# Patient Record
Sex: FEMALE | Race: WHITE | NOT HISPANIC OR LATINO | Employment: UNEMPLOYED | URBAN - METROPOLITAN AREA
[De-identification: names, ages, dates, MRNs, and addresses within clinical notes are randomized per-mention and may not be internally consistent; named-entity substitution may affect disease eponyms.]

---

## 2017-06-07 ENCOUNTER — LAB REQUISITION (OUTPATIENT)
Dept: LAB | Facility: HOSPITAL | Age: 42
End: 2017-06-07
Payer: COMMERCIAL

## 2017-06-07 ENCOUNTER — ALLSCRIPTS OFFICE VISIT (OUTPATIENT)
Dept: OTHER | Facility: OTHER | Age: 42
End: 2017-06-07

## 2017-06-07 DIAGNOSIS — Z01.419 ENCOUNTER FOR GYNECOLOGICAL EXAMINATION WITHOUT ABNORMAL FINDING: ICD-10-CM

## 2017-06-07 DIAGNOSIS — R20.2 PARESTHESIA OF SKIN: ICD-10-CM

## 2017-06-07 DIAGNOSIS — Z12.31 ENCOUNTER FOR SCREENING MAMMOGRAM FOR MALIGNANT NEOPLASM OF BREAST: ICD-10-CM

## 2017-06-07 DIAGNOSIS — E78.5 HYPERLIPIDEMIA: ICD-10-CM

## 2017-06-07 PROCEDURE — 87624 HPV HI-RISK TYP POOLED RSLT: CPT | Performed by: OBSTETRICS & GYNECOLOGY

## 2017-06-07 PROCEDURE — G0145 SCR C/V CYTO,THINLAYER,RESCR: HCPCS | Performed by: OBSTETRICS & GYNECOLOGY

## 2017-06-09 LAB — HPV RRNA GENITAL QL NAA+PROBE: NORMAL

## 2017-06-12 ENCOUNTER — GENERIC CONVERSION - ENCOUNTER (OUTPATIENT)
Dept: OTHER | Facility: OTHER | Age: 42
End: 2017-06-12

## 2017-06-12 ENCOUNTER — TRANSCRIBE ORDERS (OUTPATIENT)
Dept: ADMINISTRATIVE | Facility: HOSPITAL | Age: 42
End: 2017-06-12

## 2017-06-12 DIAGNOSIS — Z12.31 ENCOUNTER FOR MAMMOGRAM TO ESTABLISH BASELINE MAMMOGRAM: Primary | ICD-10-CM

## 2017-06-12 LAB
LAB AP GYN PRIMARY INTERPRETATION: NORMAL
Lab: NORMAL

## 2017-06-14 ENCOUNTER — ALLSCRIPTS OFFICE VISIT (OUTPATIENT)
Dept: OTHER | Facility: OTHER | Age: 42
End: 2017-06-14

## 2017-06-16 ENCOUNTER — HOSPITAL ENCOUNTER (OUTPATIENT)
Dept: MAMMOGRAPHY | Facility: HOSPITAL | Age: 42
Discharge: HOME | End: 2017-06-16
Attending: OBSTETRICS & GYNECOLOGY
Payer: COMMERCIAL

## 2017-06-16 DIAGNOSIS — Z12.31 ENCOUNTER FOR SCREENING MAMMOGRAM FOR MALIGNANT NEOPLASM OF BREAST: ICD-10-CM

## 2017-06-16 PROCEDURE — G0202 SCR MAMMO BI INCL CAD: HCPCS

## 2017-06-23 ENCOUNTER — APPOINTMENT (OUTPATIENT)
Dept: LAB | Facility: HOSPITAL | Age: 42
End: 2017-06-23
Payer: COMMERCIAL

## 2017-06-23 ENCOUNTER — TRANSCRIBE ORDERS (OUTPATIENT)
Dept: LAB | Facility: HOSPITAL | Age: 42
End: 2017-06-23

## 2017-06-23 PROCEDURE — 80053 COMPREHEN METABOLIC PANEL: CPT | Performed by: OBSTETRICS & GYNECOLOGY

## 2017-06-23 PROCEDURE — 84443 ASSAY THYROID STIM HORMONE: CPT | Performed by: OBSTETRICS & GYNECOLOGY

## 2017-06-23 PROCEDURE — 36415 COLL VENOUS BLD VENIPUNCTURE: CPT | Performed by: OBSTETRICS & GYNECOLOGY

## 2017-06-23 PROCEDURE — 80061 LIPID PANEL: CPT | Performed by: OBSTETRICS & GYNECOLOGY

## 2017-06-26 ENCOUNTER — GENERIC CONVERSION - ENCOUNTER (OUTPATIENT)
Dept: OTHER | Facility: OTHER | Age: 42
End: 2017-06-26

## 2018-01-09 NOTE — RESULT NOTES
Verified Results  (1) COMPREHENSIVE METABOLIC PANEL [id number] 54:94WV V[facility]res   TW Order Number: [id number]     Test Name Result Flag Reference   SODIUM 138 mmol/L  136-145   POTASSIUM 3 9 mmol/L  3 5-5 3   CHLORIDE 106 mmol/L  100-108   CARBON DIOXIDE 26 mmol/L  21-32   ANION GAP (CALC) 6 mmol/L  4-13   BLOOD UREA NITROGEN 11 mg/dL  5-25   CREATININE 0 70 mg/dL  0 60-1 30   Standardized to IDMS reference method   CALCIUM 8 9 mg/dL  8 3-10 1   BILI, TOTAL 0 84 mg/dL  0 20-1 00   ALK PHOSPHATAS 50 U/L     ALT (SGPT) 15 U/L  12-78   AST(SGOT) 17 U/L  5-45   ALBUMIN 3 7 g/dL  3 5-5 0   TOTAL PROTEIN 7 2 g/dL  6 4-8 2   eGFR Non-African American      >60 0 ml/min/1 73sq [facility] Disease Education Program recommendations are as follows:  GFR calculation is accurate only with a steady state creatinine  Chronic Kidney disease less than 60 ml/min/1 73 sq  meters  Kidney failure less than 15 ml/min/1 73 sq  meters  GLUCOSE FASTING 84 mg/dL  65-99     (1) LIPID PANEL FASTING W DIRECT LDL REFLEX 23Jun2017 08:48AM Vou[place]res    Order Number: [id number]     Test Name Result Flag Reference   CHOLESTEROL 216 mg/dL H    LDL CHOLESTEROL CALCULATED 110 mg/dL H 0-100   Triglyceride:         Normal              <150 mg/dl       Borderline High    150-199 mg/dl       High               200-499 mg/dl       Very High          >499 mg/dl  Cholesterol:         Desirable        <200 mg/dl      Borderline High  200-239 mg/dl      High             >239 mg/dl  HDL Cholesterol:        High    >59 mg/dL      Low     <41 mg/dL  LDL Cholesterol:        Optimal          <100 mg/dl        Near Optimal     100-129 mg/dl        Above Optimal          Borderline High   130-159 mg/dl          High              160-189 mg/dl          Very High        >189 mg/dl  LDL CALCULATED:    This screening LDL is a calculated result    It does not have the accuracy of the Direct Measured LDL in the monitoring of patients with hyperlipidemia and/or statin therapy  Direct Measure LDL (BHN033) must be ordered separately in these patients  TRIGLYCERIDES 83 mg/dL  <=150   Specimen collection should occur prior to N-Acetylcysteine or Metamizole administration due to the potential for falsely depressed results  HDL,DIRECT 89 mg/dL H 40-60   Specimen collection should occur prior to Metamizole administration due to the potential for falsely depressed results  (1) TSH WITH FT4 REFLEX 23Jun2017 08:48AM Pam Geiger    Order Number: [id number]     Test Name Result Flag Reference   TSH 2 720 uIU/mL  0 358-3 740   Patients undergoing fluorescein dye angiography may retain small amounts of fluorescein in the body for 48-72 hours post procedure  Samples containing fluorescein can produce falsely depressed TSH values  If the patient had this procedure,a specimen should be resubmitted post fluorescein clearance            The recommended reference ranges for TSH during pregnancy are as follows:  First trimester 0 1 to 2 5 uIU/mL  Second trimester  0 2 to 3 0 uIU/mL  Third trimester 0 3 to 3 0 uIU/m

## 2018-01-12 VITALS
BODY MASS INDEX: 21.02 KG/M2 | SYSTOLIC BLOOD PRESSURE: 120 MMHG | WEIGHT: 114.25 LBS | HEART RATE: 72 BPM | DIASTOLIC BLOOD PRESSURE: 70 MMHG | OXYGEN SATURATION: 98 % | RESPIRATION RATE: 16 BRPM | TEMPERATURE: 97.4 F | HEIGHT: 62 IN

## 2018-01-13 VITALS
SYSTOLIC BLOOD PRESSURE: 118 MMHG | HEIGHT: 62 IN | DIASTOLIC BLOOD PRESSURE: 64 MMHG | WEIGHT: 118 LBS | RESPIRATION RATE: 16 BRPM | HEART RATE: 80 BPM | BODY MASS INDEX: 21.71 KG/M2

## 2018-01-15 NOTE — RESULT NOTES
Verified Results  (1) THIN PREP PAP WITH IMAGING 07Jun2017 09:10AM Jenifer Tinsley Order Number: [id number]     Test Name Result Flag Reference   LAB AP CASE REPORT (Report)     Gynecologic Cytology Report            Case: [id number]                  Authorizing Provider: Vamshi Hall MD    Collected:      06/07/2017 0910        First Screen:     LEEANNE Shaffer   Received:      06/08/2017 1007        Specimen:  LIQUID-BASED PAP, SCREENING, Endocervical   HPV HIGH RISK RESULT (Report)     HPV, High Risk: HPV NEG, HPV16 NEG, HPV18 NEG      Other High Risk HPV Negative, HPV 16 Negative, HPV 18 Negative  HPV types: 16,18,31,33,35,39,45,51,52,56,58,59,66 and 68 DNA are undetectable or below the pre-set threshold  Roche???s FDA approved Ingrid 4800 is utilized with strict adherence to the ???s instruction  manual to test for the presence of High-Risk HPV DNA, as well as HPV 16 and HPV 18  This instrument  has been validated by our laboratory and/or by the   A negative result does not preclude the presence of HPV infection because results depend on adequate  specimen collection, absence of inhibitors and sufficient DNA to be detected  Additionally, HPV negative  results are not intended to prevent women from proceeding to colposcopy if clinically warranted  Positive HPV test results indicate the presence of any one or more of the high risk types, but since patients  are often co-infected with low-risk types it does not rule out the presence of low-risk types in patients  with mixed infections  LAB AP GYN PRIMARY INTERPRETATION      Negative for intraepithelial lesion or malignancy  Electronically signed by LEEANNE Shaffer on 6/12/2017 at 2:38 PM   LAB AP GYN SPECIMEN ADEQUACY      Satisfactory for evaluation  Endocervical/transformation zone component present     LAB AP GYN ADDITIONAL INFORMATION (Report)     Hologic's FDA approved ,  and ThinPrep Imaging System are   utilized with strict adherence to the 's instruction manual to   prepare gynecologic and non-gynecologic cytology specimens for the   production of ThinPrep slides as well as for gynecologic ThinPrep imaging  These processes have been validated by our laboratory and/or by the     The Pap test is not a diagnostic procedure and should not be used as the   sole means to detect cervical cancer  It is only a screening procedure to   aid in the detection of cervical cancer and its precursors  Both   false-negative and false-positive results have been experienced  Your   patient's test result should be interpreted in this context together with   the history and clinical findings         Signatures   Electronically signed by : RAGHAVENDRA Ho ; Jun 12 2017  3:12PM EST                       (Author)

## 2018-10-08 LAB
CHLAMYDIA,AMPLIFIED DNA PROBE (HISTORICAL): NEGATIVE
HCV AB SER-ACNC: NEGATIVE
N GONORRHOEAE, AMPLIFIED DNA (HISTORICAL): NEGATIVE

## 2019-04-17 ENCOUNTER — TELEPHONE (OUTPATIENT)
Dept: INTERNAL MEDICINE CLINIC | Facility: CLINIC | Age: 44
End: 2019-04-17

## 2019-05-20 ENCOUNTER — OFFICE VISIT (OUTPATIENT)
Dept: GYNECOLOGY | Facility: CLINIC | Age: 44
End: 2019-05-20

## 2019-05-20 VITALS
DIASTOLIC BLOOD PRESSURE: 52 MMHG | SYSTOLIC BLOOD PRESSURE: 102 MMHG | BODY MASS INDEX: 21.75 KG/M2 | WEIGHT: 118.2 LBS | HEIGHT: 62 IN

## 2019-05-20 DIAGNOSIS — O30.009 TWIN PREGNANCY RESULTING FROM ASSISTED REPRODUCTIVE TECHNOLOGY (ART): ICD-10-CM

## 2019-05-20 DIAGNOSIS — O21.9 NAUSEA AND VOMITING DURING PREGNANCY: ICD-10-CM

## 2019-05-20 DIAGNOSIS — O09.91 SUPERVISION OF HIGH RISK PREGNANCY, ANTEPARTUM, FIRST TRIMESTER: Primary | ICD-10-CM

## 2019-05-20 DIAGNOSIS — O09.521 MULTIGRAVIDA OF ADVANCED MATERNAL AGE IN FIRST TRIMESTER: ICD-10-CM

## 2019-05-20 DIAGNOSIS — O09.819 TWIN PREGNANCY RESULTING FROM ASSISTED REPRODUCTIVE TECHNOLOGY (ART): ICD-10-CM

## 2019-05-20 PROCEDURE — OBC: Performed by: OBSTETRICS & GYNECOLOGY

## 2019-05-20 RX ORDER — LEVOTHYROXINE SODIUM 0.12 MG/1
TABLET ORAL
COMMUNITY
Start: 2019-05-17 | End: 2019-06-11 | Stop reason: SDUPTHER

## 2019-05-20 RX ORDER — METOCLOPRAMIDE 10 MG/1
10 TABLET ORAL 4 TIMES DAILY
Qty: 30 TABLET | Refills: 2 | Status: SHIPPED | OUTPATIENT
Start: 2019-05-20 | End: 2019-06-28

## 2019-05-20 RX ORDER — DOXYLAMINE SUCCINATE AND PYRIDOXINE HYDROCHLORIDE, DELAYED RELEASE TABLETS 10 MG/10 MG 10; 10 MG/1; MG/1
TABLET, DELAYED RELEASE ORAL
COMMUNITY
End: 2019-07-31 | Stop reason: ALTCHOICE

## 2019-05-30 ENCOUNTER — TELEPHONE (OUTPATIENT)
Dept: GYNECOLOGY | Facility: CLINIC | Age: 44
End: 2019-05-30

## 2019-05-30 DIAGNOSIS — O21.9 NAUSEA AND VOMITING DURING PREGNANCY: Primary | ICD-10-CM

## 2019-05-30 RX ORDER — ONDANSETRON 8 MG/1
8 TABLET, ORALLY DISINTEGRATING ORAL EVERY 8 HOURS PRN
Qty: 20 TABLET | Refills: 1 | Status: SHIPPED | OUTPATIENT
Start: 2019-05-30 | End: 2019-06-06 | Stop reason: SDUPTHER

## 2019-05-31 ENCOUNTER — TELEPHONE (OUTPATIENT)
Dept: GYNECOLOGY | Facility: CLINIC | Age: 44
End: 2019-05-31

## 2019-05-31 DIAGNOSIS — O21.9 NAUSEA AND VOMITING DURING PREGNANCY: Primary | ICD-10-CM

## 2019-05-31 RX ORDER — DOXYLAMINE SUCCINATE AND PYRIDOXINE HYDROCHLORIDE, DELAYED RELEASE TABLETS 10 MG/10 MG 10; 10 MG/1; MG/1
TABLET, DELAYED RELEASE ORAL
Qty: 100 TABLET | Refills: 2 | Status: SHIPPED | OUTPATIENT
Start: 2019-05-31 | End: 2019-06-28

## 2019-06-06 ENCOUNTER — INITIAL PRENATAL (OUTPATIENT)
Dept: OBGYN CLINIC | Facility: CLINIC | Age: 44
End: 2019-06-06

## 2019-06-06 VITALS — BODY MASS INDEX: 20.96 KG/M2 | WEIGHT: 114.6 LBS | DIASTOLIC BLOOD PRESSURE: 62 MMHG | SYSTOLIC BLOOD PRESSURE: 110 MMHG

## 2019-06-06 DIAGNOSIS — O21.9 NAUSEA AND VOMITING DURING PREGNANCY: ICD-10-CM

## 2019-06-06 DIAGNOSIS — O30.009 TWIN PREGNANCY RESULTING FROM ASSISTED REPRODUCTIVE TECHNOLOGY (ART): ICD-10-CM

## 2019-06-06 DIAGNOSIS — O09.819 TWIN PREGNANCY RESULTING FROM ASSISTED REPRODUCTIVE TECHNOLOGY (ART): ICD-10-CM

## 2019-06-06 DIAGNOSIS — O09.91 SUPERVISION OF HIGH RISK PREGNANCY, ANTEPARTUM, FIRST TRIMESTER: Primary | ICD-10-CM

## 2019-06-06 PROCEDURE — PNV: Performed by: NURSE PRACTITIONER

## 2019-06-06 RX ORDER — ONDANSETRON 8 MG/1
8 TABLET, ORALLY DISINTEGRATING ORAL EVERY 8 HOURS PRN
Qty: 20 TABLET | Refills: 1 | Status: SHIPPED | OUTPATIENT
Start: 2019-06-06 | End: 2019-06-25 | Stop reason: SDUPTHER

## 2019-06-06 RX ORDER — PANTOPRAZOLE SODIUM 40 MG/1
40 TABLET, DELAYED RELEASE ORAL DAILY
Qty: 30 TABLET | Refills: 3 | Status: SHIPPED | OUTPATIENT
Start: 2019-06-06 | End: 2019-07-31 | Stop reason: ALTCHOICE

## 2019-06-10 ENCOUNTER — TELEPHONE (OUTPATIENT)
Dept: OBGYN CLINIC | Facility: CLINIC | Age: 44
End: 2019-06-10

## 2019-06-11 DIAGNOSIS — E03.9 HYPOTHYROIDISM AFFECTING PREGNANCY IN FIRST TRIMESTER: Primary | ICD-10-CM

## 2019-06-11 DIAGNOSIS — O99.281 HYPOTHYROIDISM AFFECTING PREGNANCY IN FIRST TRIMESTER: Primary | ICD-10-CM

## 2019-06-11 RX ORDER — LEVOTHYROXINE SODIUM 0.12 MG/1
125 TABLET ORAL DAILY
Qty: 30 TABLET | Refills: 1 | Status: SHIPPED | OUTPATIENT
Start: 2019-06-11 | End: 2019-07-31 | Stop reason: SDUPTHER

## 2019-06-25 DIAGNOSIS — O21.9 NAUSEA AND VOMITING DURING PREGNANCY: ICD-10-CM

## 2019-06-25 RX ORDER — ONDANSETRON 8 MG/1
8 TABLET, ORALLY DISINTEGRATING ORAL EVERY 8 HOURS PRN
Qty: 20 TABLET | Refills: 1 | Status: SHIPPED | OUTPATIENT
Start: 2019-06-25 | End: 2019-07-15 | Stop reason: SDUPTHER

## 2019-06-26 ENCOUNTER — ROUTINE PRENATAL (OUTPATIENT)
Dept: PERINATAL CARE | Facility: CLINIC | Age: 44
End: 2019-06-26
Payer: COMMERCIAL

## 2019-06-26 ENCOUNTER — TELEPHONE (OUTPATIENT)
Dept: OBGYN CLINIC | Facility: CLINIC | Age: 44
End: 2019-06-26

## 2019-06-26 VITALS
BODY MASS INDEX: 21.09 KG/M2 | HEART RATE: 82 BPM | WEIGHT: 114.6 LBS | SYSTOLIC BLOOD PRESSURE: 127 MMHG | DIASTOLIC BLOOD PRESSURE: 85 MMHG | HEIGHT: 62 IN

## 2019-06-26 DIAGNOSIS — O30.009 TWIN PREGNANCY RESULTING FROM ASSISTED REPRODUCTIVE TECHNOLOGY (ART): ICD-10-CM

## 2019-06-26 DIAGNOSIS — O09.819 TWIN PREGNANCY RESULTING FROM ASSISTED REPRODUCTIVE TECHNOLOGY (ART): ICD-10-CM

## 2019-06-26 DIAGNOSIS — O35.8XX1 ECHOGENIC FOCUS OF HEART OF FETUS AFFECTING ANTEPARTUM CARE OF MOTHER, FETUS 1: ICD-10-CM

## 2019-06-26 DIAGNOSIS — O09.511 ELDERLY PRIMIGRAVIDA IN FIRST TRIMESTER: ICD-10-CM

## 2019-06-26 DIAGNOSIS — Z36.82 ENCOUNTER FOR NUCHAL TRANSLUCENCY TESTING: ICD-10-CM

## 2019-06-26 DIAGNOSIS — O09.811 PREGNANCY RESULTING FROM IN VITRO FERTILIZATION IN FIRST TRIMESTER: Primary | ICD-10-CM

## 2019-06-26 DIAGNOSIS — O21.9 NAUSEA AND VOMITING DURING PREGNANCY: ICD-10-CM

## 2019-06-26 DIAGNOSIS — Z3A.13 13 WEEKS GESTATION OF PREGNANCY: ICD-10-CM

## 2019-06-26 DIAGNOSIS — O09.91 SUPERVISION OF HIGH RISK PREGNANCY, ANTEPARTUM, FIRST TRIMESTER: ICD-10-CM

## 2019-06-26 PROCEDURE — 76814 OB US NUCHAL MEAS ADD-ON: CPT | Performed by: OBSTETRICS & GYNECOLOGY

## 2019-06-26 PROCEDURE — 99242 OFF/OP CONSLTJ NEW/EST SF 20: CPT | Performed by: OBSTETRICS & GYNECOLOGY

## 2019-06-26 PROCEDURE — 76813 OB US NUCHAL MEAS 1 GEST: CPT | Performed by: OBSTETRICS & GYNECOLOGY

## 2019-06-26 PROCEDURE — 76801 OB US < 14 WKS SINGLE FETUS: CPT | Performed by: OBSTETRICS & GYNECOLOGY

## 2019-06-26 PROCEDURE — 76802 OB US < 14 WKS ADDL FETUS: CPT | Performed by: OBSTETRICS & GYNECOLOGY

## 2019-06-26 RX ORDER — ASPIRIN 81 MG/1
162 TABLET ORAL DAILY
Qty: 180 TABLET | Refills: 3 | Status: SHIPPED | OUTPATIENT
Start: 2019-06-26 | End: 2019-11-26 | Stop reason: ALTCHOICE

## 2019-06-26 RX ORDER — UREA 10 %
800 LOTION (ML) TOPICAL DAILY
COMMUNITY

## 2019-06-28 ENCOUNTER — ROUTINE PRENATAL (OUTPATIENT)
Dept: OBGYN CLINIC | Facility: CLINIC | Age: 44
End: 2019-06-28

## 2019-06-28 ENCOUNTER — HOSPITAL ENCOUNTER (OUTPATIENT)
Dept: INFUSION CENTER | Facility: CLINIC | Age: 44
Discharge: HOME | End: 2019-06-28
Payer: COMMERCIAL

## 2019-06-28 VITALS
HEART RATE: 77 BPM | SYSTOLIC BLOOD PRESSURE: 102 MMHG | OXYGEN SATURATION: 99 % | RESPIRATION RATE: 18 BRPM | DIASTOLIC BLOOD PRESSURE: 64 MMHG | TEMPERATURE: 98.1 F

## 2019-06-28 VITALS
BODY MASS INDEX: 21.71 KG/M2 | DIASTOLIC BLOOD PRESSURE: 60 MMHG | WEIGHT: 118 LBS | SYSTOLIC BLOOD PRESSURE: 108 MMHG | HEIGHT: 62 IN

## 2019-06-28 DIAGNOSIS — O09.91 SUPERVISION OF HIGH RISK PREGNANCY, ANTEPARTUM, FIRST TRIMESTER: ICD-10-CM

## 2019-06-28 DIAGNOSIS — O09.819 TWIN PREGNANCY RESULTING FROM ASSISTED REPRODUCTIVE TECHNOLOGY (ART): ICD-10-CM

## 2019-06-28 DIAGNOSIS — O21.9 NAUSEA AND VOMITING DURING PREGNANCY: Primary | ICD-10-CM

## 2019-06-28 DIAGNOSIS — Z3A.14 PREGNANCY WITH 14 COMPLETED WEEKS GESTATION: Primary | ICD-10-CM

## 2019-06-28 DIAGNOSIS — O30.009 TWIN PREGNANCY RESULTING FROM ASSISTED REPRODUCTIVE TECHNOLOGY (ART): ICD-10-CM

## 2019-06-28 PROBLEM — O35.8XX1 ECHOGENIC FOCUS OF HEART OF FETUS AFFECTING ANTEPARTUM CARE OF MOTHER, FETUS 1: Status: ACTIVE | Noted: 2019-06-28

## 2019-06-28 PROBLEM — O35.BXX1 ECHOGENIC FOCUS OF HEART OF FETUS AFFECTING ANTEPARTUM CARE OF MOTHER, FETUS 1: Status: ACTIVE | Noted: 2019-06-28

## 2019-06-28 PROBLEM — Z3A.13 13 WEEKS GESTATION OF PREGNANCY: Status: ACTIVE | Noted: 2019-06-28

## 2019-06-28 PROCEDURE — 96360 HYDRATION IV INFUSION INIT: CPT

## 2019-06-28 PROCEDURE — PNV: Performed by: OBSTETRICS & GYNECOLOGY

## 2019-06-28 RX ADMIN — SODIUM CHLORIDE 1000 ML: 0.9 INJECTION, SOLUTION INTRAVENOUS at 10:33

## 2019-06-28 NOTE — PROGRESS NOTES
Patient tolerated her hydration without any adverse reactions   Patient declined avs but has a copy of her schedule

## 2019-06-28 NOTE — PROGRESS NOTES
Patient is here for hydration  She offers no complaints at this time  She states the zofran she is taking now at home is helping and she is not nauseated

## 2019-07-01 ENCOUNTER — HOSPITAL ENCOUNTER (OUTPATIENT)
Dept: INFUSION CENTER | Facility: CLINIC | Age: 44
Discharge: HOME | End: 2019-07-01
Payer: COMMERCIAL

## 2019-07-01 VITALS
DIASTOLIC BLOOD PRESSURE: 62 MMHG | RESPIRATION RATE: 18 BRPM | OXYGEN SATURATION: 99 % | SYSTOLIC BLOOD PRESSURE: 105 MMHG | HEART RATE: 80 BPM | TEMPERATURE: 98.2 F

## 2019-07-01 DIAGNOSIS — O30.009 TWIN PREGNANCY RESULTING FROM ASSISTED REPRODUCTIVE TECHNOLOGY (ART): ICD-10-CM

## 2019-07-01 DIAGNOSIS — O09.91 SUPERVISION OF HIGH RISK PREGNANCY, ANTEPARTUM, FIRST TRIMESTER: ICD-10-CM

## 2019-07-01 DIAGNOSIS — O21.9 NAUSEA AND VOMITING DURING PREGNANCY: Primary | ICD-10-CM

## 2019-07-01 DIAGNOSIS — O09.819 TWIN PREGNANCY RESULTING FROM ASSISTED REPRODUCTIVE TECHNOLOGY (ART): ICD-10-CM

## 2019-07-01 PROCEDURE — 96360 HYDRATION IV INFUSION INIT: CPT

## 2019-07-01 RX ADMIN — SODIUM CHLORIDE 1000 ML: 0.9 INJECTION, SOLUTION INTRAVENOUS at 14:48

## 2019-07-01 NOTE — PROGRESS NOTES
Pt tolerated infusion without complications, pt aware of next appointment on July 8, pt declined avs

## 2019-07-01 NOTE — PLAN OF CARE
Problem: Potential for Falls  Goal: Patient will remain free of falls  Description  INTERVENTIONS:  - Assess patient frequently for physical needs  -  Identify cognitive and physical deficits and behaviors that affect risk of falls  -  [place] fall precautions as indicated by assessment   - Educate patient/family on patient safety including physical limitations  - Instruct patient to call for assistance with activity based on assessment  - Modify environment to reduce risk of injury  - Consider OT/PT consult to assist with strengthening/mobility  Outcome: Progressing     Problem: Knowledge Deficit  Goal: Patient/family/caregiver demonstrates understanding of disease process, treatment plan, medications, and discharge instructions  Description  Complete learning assessment and assess knowledge base    Interventions:  - Provide teaching at level of understanding  - Provide teaching via preferred learning methods  Outcome: Progressing

## 2019-07-02 ENCOUNTER — HOSPITAL ENCOUNTER (OUTPATIENT)
Dept: INFUSION CENTER | Facility: CLINIC | Age: 44
End: 2019-07-02

## 2019-07-02 ENCOUNTER — OFFICE VISIT (OUTPATIENT)
Dept: PERINATAL CARE | Facility: CLINIC | Age: 44
End: 2019-07-02

## 2019-07-02 VITALS
BODY MASS INDEX: 21.38 KG/M2 | WEIGHT: 116.2 LBS | SYSTOLIC BLOOD PRESSURE: 103 MMHG | DIASTOLIC BLOOD PRESSURE: 70 MMHG | HEIGHT: 62 IN | HEART RATE: 77 BPM

## 2019-07-02 DIAGNOSIS — O35.8XX1 ECHOGENIC FOCUS OF HEART OF FETUS AFFECTING ANTEPARTUM CARE OF MOTHER, FETUS 1: Primary | ICD-10-CM

## 2019-07-02 DIAGNOSIS — O30.009 TWIN PREGNANCY RESULTING FROM ASSISTED REPRODUCTIVE TECHNOLOGY (ART): ICD-10-CM

## 2019-07-02 DIAGNOSIS — O35.2XX0 HEREDITARY DISEASE IN FAMILY POSSIBLY AFFECTING FETUS, AFFECTING MANAGEMENT OF MOTHER IN PREGNANCY, SINGLE OR UNSPECIFIED FETUS: ICD-10-CM

## 2019-07-02 DIAGNOSIS — O09.819 TWIN PREGNANCY RESULTING FROM ASSISTED REPRODUCTIVE TECHNOLOGY (ART): ICD-10-CM

## 2019-07-02 DIAGNOSIS — O09.511 ELDERLY PRIMIGRAVIDA IN FIRST TRIMESTER: ICD-10-CM

## 2019-07-02 NOTE — PROGRESS NOTES
Genetic Counseling   High-Risk Gestation Note    Appointment Date:  2019  Referred By: Susana Gilliland MD  YOB: 1975  Partner:  Tyron Parker     Indication for Visit:  abnormal ultrasound screen, personal and/or family history of malformation :  spina bifida and abnormal expanded carrier screening for FOB and egg donor    Pregnancy History:   Estimated Date of Delivery: 2019  Estimated Gestational Age: 15w2d     Genetic Counseling:  Tonio Pearson is here to discuss the abnormal ultrasound finding of an ICEF in [de-identified] B, in a dichorionic-diamniotic twin gestation resulting from IVF using a 25 y o  egg donor  Issues Discussed:  average population risk- 3-4% in the average pregnancy of serious condition or birth defect  2-3% risk of mental retardation  Not all detected by prenatal testing; chromosomal: non-disjunction-  for Down syndrome and  for any chromosome abnormality at age 24 at delivery (due to 25 y o  egg donor); mode of inheritance/mechanism:  sporadic occurence of spina bifida, autosomal recessive for GJB2 and USH2A related disorders; other malformation:  intracardiac echogenic focus; and IVF pregnancy related congenital heart defect risk of 1-3%  Options Discussed:  Amniocentesis-risks and limitations discussed  Level II ultrasound to screen for structural anomalies  Multiple marker serum screen  Fetal echocardiogram for congenital heart defects  Additional Information / Impression:  Tonio Pearson is a 38 y o  female who presented for genetic counseling to discuss risks related to the abnormal ultrasound finding of an intracardiac echogenic focus in Baby B on an ultrasound at 13 weeks gestation  The pregnancy was acheived via IVF (specifically ICSI) using a 25 y o  egg donor and the patient's 's sperm  Pre-implantation genetic screening (PGS) was reportedly performed however results from this were not included in the records from [facility] [employer]    Two embryos were transferred resulting in the dichorionic-diamniotic pregnancy  We discussed that an intracardiac echogenic focus (ICEF) is a structure within the fetal heart with echogenicity similar to or greater than surrounding bone  They occur, predominantly in the left ventricle, in up to 20% of pregnancies, however are not commonly seen in the first trimester  When detected in the second trimester the differential diagnosis for an ICEF includes a normal variant or aneuploidy, specifically Down syndrome  If aneuploidy is ruled out the ICEF most likely represents a normal variant of the development of the papillary muscles and chordae tendinae  Javier Schulz has not had any serum screening in the current pregnancy  We reviewed that based on the age of the egg donor and ultrasound finding of an ICEF, the risk for Down syndrome in Baby B has increased to 1/  We discussed additional testing and screening options should Javier Schulz remain concerned  The risks, benefits, and limitations of amniocentesis were discussed with the patient  Amniocentesis is performed under direct real time ultrasound visualization to avoid both the fetus and the placenta  Once amniotic fluid is withdrawn, laboratory analysis is performed and amniotic fluid alpha-fetoprotein, as well as chromosome analysis is undertaken  The risk of genetic amniocentesis includes, but is not limited to less than 1 in 300 pregnancy loss rate or  delivery rate if 23 weeks or greater, infection, bleeding, rupture of membranes, failure of cells to grow, karyotype error, laboratory error, etc   Occasionally a repeat amniocentesis is necessary due to cell culture failure  Chromosome analysis from amniocentesis is 99 9% accurate and alpha-fetoprotein analysis can detect approximately 95% of open neural tube defects      Sequential screening consists of first trimester measurement of nuchal translucency combined with first trimester biochemical analysis, as well as second trimester biochemical analysis  In unger pregnancies, it is able to detect approximately 90% of pregnancies in which the fetus has Down syndrome, 90% of pregnancies in which the fetus has trisomy 25 and 80% of pregnancies which the fetus has an open neural tube defect  Detection rates in a twin gestation are significantly less  As the patient is close to 15 weeks gestation, a Quad screen is available from 15-20 weeks, with the ideal timing of the blood draw at 16-18 weeks  The detection rates for Down syndrome in a twin gestation is approximately 65%  We reviewed that level II anatomy ultrasound is typically performed at approximately 20 weeks gestation  Level II ultrasound evaluation is between 60-80% accurate in detecting major physical birth defects and variations in fetal development that may be associated with chromosome abnormalities  Level II ultrasound evaluation is not able to detect all birth defects or health problems  We also discussed that the general population background risk for a congenital heart defect (CHD) is approximately 6-12/1,000 live births  Pregnancies conceived by assisted reproductive technology, specifically IVF or ICSI, have an increased risk for CHD of approximately 1-3%  It is unknown if this is related to the increased risk for multiple gestations, the unknown reason for sub/infertility, or maternal age  However, the benefits and limitations of fetal echocardiogram were reviewed if the patient desired further testing  After discussing the available prenatal testing and screening options this patient was unsure if she would like to pursue diagnostic testing due to procedural related complications  She was also unsure if she would like to pursue serum screening for chromosome aneuploidies due to the low detection rate    Shondamichell Thapa stated she would like to further discuss these options with her  and she will contact us if she wants further testing/screening  The patient did elect to pursue Level II ultrasound and fetal echocardiogram at the appropriate times  Histories were taken on the patient's partner's family during our session  Rula Brand has a paternal cousin with spina bifida and hydrocephalus who requires use of a wheelchair  We reviewed the general population background risk of 1-2/1,000 live births for spina bifida  Based on the distance of the affected relative the risk to current pregnancy would likely be that of the general population risk  We reviewed the benefits and limitations of maternal serum screening and anatomy ultrasound in detecting spina bifida  [place] elected to pursue MSAFP screening at 15-20 weeks gestation  Further review of family history for the patient and her partner was noncontributory  The family history was not significant for other genetic diseases or disorders, intellectual disability, birth defects, fetal loss, or consanguinity  Patient reports both her and her  being of [place] decent  She denies either of them having known Ashkenazi Yazdanism ancestry  The egg donor and Rula Brand both had expanded carrier screening performed, each with positive results  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, and 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)  They were each negative for the other respective gene mutations  We reviewed that as they are carriers of different things the risk to the current pregnancy is reduced, but not eliminated  Both GJB2 and Usher syndrome are associated with hearing loss, thus we reviewed the benefits and limitations of the  hearing screen      Lastly, we discussed the fact that everyone in the general population regardless of age, family history, or medical background has approximately a 3-5% risk of having a child with some type of congenital anomaly, genetic disease or intellectual disability  Currently there are no tests available to rule out all birth defects or health problems  Jessealyse George was provided with our contact information  I encouraged her to call with any questions or concerns      Time spent with Genetic Counselor: 45 minutes    Plan / Tests Ordered:  1) Patient undecided on diagnostic testing vs  screening - will discuss with  and let us know  2) MSAFP screening at 15-20 weeks if Quad screen or amniocentesis not performed  3) Level II ultrasound at approximately 20 weeks gestation  4) Fetal echocardiogram at 22-23 weeks gestation

## 2019-07-03 ENCOUNTER — TELEPHONE (OUTPATIENT)
Dept: PERINATAL CARE | Facility: CLINIC | Age: 44
End: 2019-07-03

## 2019-07-03 NOTE — PROGRESS NOTES
Patient reports no fm, no headache, cramping, bleeding, loss of fluid, edema, dom violence, or smoking  brandee pnv has pnc visit coming up, return in 4 weeks or sooner as needed  Has slip will get prenatal labs

## 2019-07-03 NOTE — TELEPHONE ENCOUNTER
Received a message that patient called with additional questions  Called patient and left message for her to return phone call

## 2019-07-08 ENCOUNTER — HOSPITAL ENCOUNTER (OUTPATIENT)
Dept: INFUSION CENTER | Facility: CLINIC | Age: 44
Discharge: HOME | End: 2019-07-08
Payer: COMMERCIAL

## 2019-07-08 VITALS
RESPIRATION RATE: 16 BRPM | DIASTOLIC BLOOD PRESSURE: 67 MMHG | HEART RATE: 83 BPM | SYSTOLIC BLOOD PRESSURE: 106 MMHG | TEMPERATURE: 97 F

## 2019-07-08 DIAGNOSIS — O09.819 TWIN PREGNANCY RESULTING FROM ASSISTED REPRODUCTIVE TECHNOLOGY (ART): ICD-10-CM

## 2019-07-08 DIAGNOSIS — O30.009 TWIN PREGNANCY RESULTING FROM ASSISTED REPRODUCTIVE TECHNOLOGY (ART): ICD-10-CM

## 2019-07-08 DIAGNOSIS — O21.9 NAUSEA AND VOMITING DURING PREGNANCY: Primary | ICD-10-CM

## 2019-07-08 DIAGNOSIS — O09.91 SUPERVISION OF HIGH RISK PREGNANCY, ANTEPARTUM, FIRST TRIMESTER: ICD-10-CM

## 2019-07-08 PROCEDURE — 96360 HYDRATION IV INFUSION INIT: CPT

## 2019-07-08 RX ADMIN — SODIUM CHLORIDE 1000 ML: 0.9 INJECTION, SOLUTION INTRAVENOUS at 14:06

## 2019-07-08 NOTE — PROGRESS NOTES
Patient tolerated hydration without complication  AVS declined, aware of next appointment, left unit in stable condition

## 2019-07-08 NOTE — PLAN OF CARE
Problem: Potential for Falls  Goal: Patient will remain free of falls  Description  INTERVENTIONS:  - Assess patient frequently for physical needs  -  Identify cognitive and physical deficits and behaviors that affect risk of falls    -  [place] fall precautions as indicated by assessment   - Educate patient/family on patient safety including physical limitations  - Instruct patient to call for assistance with activity based on assessment  - Modify environment to reduce risk of injury  - Consider OT/PT consult to assist with strengthening/mobility  Outcome: Progressing

## 2019-07-11 LAB
ABO GROUP BLD: NORMAL
BASOPHILS # BLD AUTO: 54 CELLS/UL (ref 0–200)
BASOPHILS NFR BLD AUTO: 0.6 %
BLD GP AB SCN SERPL QL: NORMAL
EOSINOPHIL # BLD AUTO: 189 CELLS/UL (ref 15–500)
EOSINOPHIL NFR BLD AUTO: 2.1 %
ERYTHROCYTE [DISTWIDTH] IN BLOOD BY AUTOMATED COUNT: 12.8 % (ref 11–15)
EST. AVERAGE GLUCOSE BLD GHB EST-MCNC: 94 (CALC)
EST. AVERAGE GLUCOSE BLD GHB EST-SCNC: 5.2 (CALC)
GLUCOSE P FAST SERPL-MCNC: 71 MG/DL (ref 65–99)
HBA1C MFR BLD: 4.9 % OF TOTAL HGB
HBV SURFACE AG SERPL QL IA: NORMAL
HCT VFR BLD AUTO: 38.2 % (ref 35–45)
HCV AB S/CO SERPL IA: 0.02
HCV AB SERPL QL IA: NORMAL
HGB BLD-MCNC: 12.6 G/DL (ref 11.7–15.5)
LYMPHOCYTES # BLD AUTO: 1233 CELLS/UL (ref 850–3900)
LYMPHOCYTES NFR BLD AUTO: 13.7 %
MCH RBC QN AUTO: 30.5 PG (ref 27–33)
MCHC RBC AUTO-ENTMCNC: 33 G/DL (ref 32–36)
MCV RBC AUTO: 92.5 FL (ref 80–100)
MONOCYTES # BLD AUTO: 531 CELLS/UL (ref 200–950)
MONOCYTES NFR BLD AUTO: 5.9 %
NEUTROPHILS # BLD AUTO: 6993 CELLS/UL (ref 1500–7800)
NEUTROPHILS NFR BLD AUTO: 77.7 %
PLATELET # BLD AUTO: 265 THOUSAND/UL (ref 140–400)
PMV BLD REES-ECKER: 9.9 FL (ref 7.5–12.5)
RBC # BLD AUTO: 4.13 MILLION/UL (ref 3.8–5.1)
RH BLD: NORMAL
RPR SER QL: NORMAL
RUBV IGG SERPL IA-ACNC: 9.8 INDEX
WBC # BLD AUTO: 9 THOUSAND/UL (ref 3.8–10.8)

## 2019-07-12 ENCOUNTER — HOSPITAL ENCOUNTER (OUTPATIENT)
Dept: INFUSION CENTER | Facility: CLINIC | Age: 44
Discharge: HOME | End: 2019-07-12
Payer: COMMERCIAL

## 2019-07-12 VITALS
DIASTOLIC BLOOD PRESSURE: 62 MMHG | TEMPERATURE: 97.6 F | SYSTOLIC BLOOD PRESSURE: 110 MMHG | HEART RATE: 80 BPM | RESPIRATION RATE: 20 BRPM

## 2019-07-12 DIAGNOSIS — O21.9 NAUSEA AND VOMITING DURING PREGNANCY: Primary | ICD-10-CM

## 2019-07-12 DIAGNOSIS — O09.91 SUPERVISION OF HIGH RISK PREGNANCY, ANTEPARTUM, FIRST TRIMESTER: ICD-10-CM

## 2019-07-12 DIAGNOSIS — O30.009 TWIN PREGNANCY RESULTING FROM ASSISTED REPRODUCTIVE TECHNOLOGY (ART): ICD-10-CM

## 2019-07-12 DIAGNOSIS — O09.819 TWIN PREGNANCY RESULTING FROM ASSISTED REPRODUCTIVE TECHNOLOGY (ART): ICD-10-CM

## 2019-07-12 PROCEDURE — 96360 HYDRATION IV INFUSION INIT: CPT

## 2019-07-12 RX ADMIN — SODIUM CHLORIDE 1000 ML: 0.9 INJECTION, SOLUTION INTRAVENOUS at 14:00

## 2019-07-15 ENCOUNTER — HOSPITAL ENCOUNTER (OUTPATIENT)
Dept: INFUSION CENTER | Facility: CLINIC | Age: 44
Discharge: HOME | End: 2019-07-15
Payer: COMMERCIAL

## 2019-07-15 VITALS
RESPIRATION RATE: 18 BRPM | DIASTOLIC BLOOD PRESSURE: 70 MMHG | HEART RATE: 80 BPM | SYSTOLIC BLOOD PRESSURE: 118 MMHG | TEMPERATURE: 97.2 F

## 2019-07-15 DIAGNOSIS — O09.819 TWIN PREGNANCY RESULTING FROM ASSISTED REPRODUCTIVE TECHNOLOGY (ART): ICD-10-CM

## 2019-07-15 DIAGNOSIS — O21.9 NAUSEA AND VOMITING DURING PREGNANCY: Primary | ICD-10-CM

## 2019-07-15 DIAGNOSIS — O21.9 NAUSEA AND VOMITING DURING PREGNANCY: ICD-10-CM

## 2019-07-15 DIAGNOSIS — O09.91 SUPERVISION OF HIGH RISK PREGNANCY, ANTEPARTUM, FIRST TRIMESTER: ICD-10-CM

## 2019-07-15 DIAGNOSIS — O30.009 TWIN PREGNANCY RESULTING FROM ASSISTED REPRODUCTIVE TECHNOLOGY (ART): ICD-10-CM

## 2019-07-15 PROCEDURE — 96360 HYDRATION IV INFUSION INIT: CPT

## 2019-07-15 RX ORDER — ONDANSETRON 8 MG/1
8 TABLET, ORALLY DISINTEGRATING ORAL EVERY 8 HOURS PRN
Qty: 20 TABLET | Refills: 1 | Status: SHIPPED | OUTPATIENT
Start: 2019-07-15 | End: 2019-07-31 | Stop reason: SDUPTHER

## 2019-07-15 RX ADMIN — SODIUM CHLORIDE 1000 ML: 0.9 INJECTION, SOLUTION INTRAVENOUS at 13:45

## 2019-07-15 NOTE — PROGRESS NOTES
Pt tolerated treatment well  Pt has no future infusion appointments  She will be following up with OB and they will decide at that time whether or not she will require further hydration   Pt declined AVS

## 2019-07-31 ENCOUNTER — ROUTINE PRENATAL (OUTPATIENT)
Dept: OBGYN CLINIC | Facility: CLINIC | Age: 44
End: 2019-07-31

## 2019-07-31 VITALS — SYSTOLIC BLOOD PRESSURE: 102 MMHG | WEIGHT: 121.2 LBS | BODY MASS INDEX: 22.17 KG/M2 | DIASTOLIC BLOOD PRESSURE: 62 MMHG

## 2019-07-31 DIAGNOSIS — O30.009 TWIN PREGNANCY RESULTING FROM ASSISTED REPRODUCTIVE TECHNOLOGY (ART): Primary | ICD-10-CM

## 2019-07-31 DIAGNOSIS — O09.819 TWIN PREGNANCY RESULTING FROM ASSISTED REPRODUCTIVE TECHNOLOGY (ART): Primary | ICD-10-CM

## 2019-07-31 DIAGNOSIS — E03.9 HYPOTHYROIDISM AFFECTING PREGNANCY IN FIRST TRIMESTER: ICD-10-CM

## 2019-07-31 DIAGNOSIS — Z3A.19 19 WEEKS GESTATION OF PREGNANCY: ICD-10-CM

## 2019-07-31 DIAGNOSIS — O99.281 HYPOTHYROIDISM AFFECTING PREGNANCY IN FIRST TRIMESTER: ICD-10-CM

## 2019-07-31 DIAGNOSIS — O21.9 NAUSEA AND VOMITING DURING PREGNANCY: ICD-10-CM

## 2019-07-31 PROCEDURE — PNV: Performed by: OBSTETRICS & GYNECOLOGY

## 2019-07-31 RX ORDER — LEVOTHYROXINE SODIUM 0.12 MG/1
125 TABLET ORAL DAILY
Qty: 30 TABLET | Refills: 1 | Status: SHIPPED | OUTPATIENT
Start: 2019-07-31 | End: 2019-09-02 | Stop reason: SDUPTHER

## 2019-07-31 RX ORDER — ONDANSETRON 8 MG/1
8 TABLET, ORALLY DISINTEGRATING ORAL EVERY 8 HOURS PRN
Qty: 20 TABLET | Refills: 1 | Status: SHIPPED | OUTPATIENT
Start: 2019-07-31 | End: 2019-09-19 | Stop reason: SDUPTHER

## 2019-08-17 PROBLEM — O09.512 AMA (ADVANCED MATERNAL AGE) PRIMIGRAVIDA 35+, SECOND TRIMESTER: Status: ACTIVE | Noted: 2019-05-20

## 2019-08-17 NOTE — PATIENT INSTRUCTIONS
Thank you for choosing [id number] [facility] for your  care today  If you have any questions about your ultrasound or care, please do not hesitate to contact us or your primary obstetrician  Continue taking your aspirin 162mg daily for prevention of preeclampsia  If you have asthma and notice an increase in symptom frequency or severity, consider stopping this medication

## 2019-08-19 ENCOUNTER — ULTRASOUND (OUTPATIENT)
Dept: PERINATAL CARE | Facility: CLINIC | Age: 44
End: 2019-08-19
Payer: COMMERCIAL

## 2019-08-19 VITALS
SYSTOLIC BLOOD PRESSURE: 108 MMHG | HEIGHT: 62 IN | HEART RATE: 99 BPM | WEIGHT: 128 LBS | DIASTOLIC BLOOD PRESSURE: 77 MMHG | BODY MASS INDEX: 23.55 KG/M2

## 2019-08-19 DIAGNOSIS — O30.009 TWIN PREGNANCY RESULTING FROM ASSISTED REPRODUCTIVE TECHNOLOGY (ART): Primary | ICD-10-CM

## 2019-08-19 DIAGNOSIS — O09.512 AMA (ADVANCED MATERNAL AGE) PRIMIGRAVIDA 35+, SECOND TRIMESTER: ICD-10-CM

## 2019-08-19 DIAGNOSIS — Z36.86 ENCOUNTER FOR ANTENATAL SCREENING FOR CERVICAL LENGTH: ICD-10-CM

## 2019-08-19 DIAGNOSIS — Z3A.21 21 WEEKS GESTATION OF PREGNANCY: ICD-10-CM

## 2019-08-19 DIAGNOSIS — Z36.3 ENCOUNTER FOR ANTENATAL SCREENING FOR MALFORMATIONS: ICD-10-CM

## 2019-08-19 DIAGNOSIS — O09.819 TWIN PREGNANCY RESULTING FROM ASSISTED REPRODUCTIVE TECHNOLOGY (ART): Primary | ICD-10-CM

## 2019-08-19 PROCEDURE — 76811 OB US DETAILED SNGL FETUS: CPT | Performed by: OBSTETRICS & GYNECOLOGY

## 2019-08-19 PROCEDURE — 76817 TRANSVAGINAL US OBSTETRIC: CPT | Performed by: OBSTETRICS & GYNECOLOGY

## 2019-08-19 PROCEDURE — 76812 OB US DETAILED ADDL FETUS: CPT | Performed by: OBSTETRICS & GYNECOLOGY

## 2019-08-19 PROCEDURE — 99212 OFFICE O/P EST SF 10 MIN: CPT | Performed by: OBSTETRICS & GYNECOLOGY

## 2019-08-19 NOTE — PROGRESS NOTES
[address]: Ms Kevin Goodson was seen today at 21w3d for anatomic survey and cervical length screening ultrasound  See ultrasound report under "OB Procedures" tab  Please don't hesitate to contact our office with any concerns or questions    Craig Mejía MD

## 2019-08-19 NOTE — PROGRESS NOTES
A transvaginal ultrasound was performed  Sonographer note on use of High Level Disinfection Process (Trophon) for transvaginal probe# 2 used, serial [id number]    Donn Holman

## 2019-08-27 ENCOUNTER — TELEPHONE (OUTPATIENT)
Dept: OBGYN CLINIC | Facility: CLINIC | Age: 44
End: 2019-08-27

## 2019-09-02 DIAGNOSIS — E03.9 HYPOTHYROIDISM AFFECTING PREGNANCY IN FIRST TRIMESTER: ICD-10-CM

## 2019-09-02 DIAGNOSIS — O99.281 HYPOTHYROIDISM AFFECTING PREGNANCY IN FIRST TRIMESTER: ICD-10-CM

## 2019-09-03 ENCOUNTER — ROUTINE PRENATAL (OUTPATIENT)
Dept: OBGYN CLINIC | Facility: CLINIC | Age: 44
End: 2019-09-03

## 2019-09-03 VITALS
BODY MASS INDEX: 24.95 KG/M2 | WEIGHT: 135.6 LBS | HEIGHT: 62 IN | SYSTOLIC BLOOD PRESSURE: 110 MMHG | DIASTOLIC BLOOD PRESSURE: 60 MMHG

## 2019-09-03 DIAGNOSIS — Z36.9 ANTENATAL SCREENING ENCOUNTER: Primary | ICD-10-CM

## 2019-09-03 DIAGNOSIS — Z34.83 PRENATAL CARE, SUBSEQUENT PREGNANCY, THIRD TRIMESTER: ICD-10-CM

## 2019-09-03 PROCEDURE — PNV: Performed by: OBSTETRICS & GYNECOLOGY

## 2019-09-03 RX ORDER — MAGNESIUM 30 MG
30 TABLET ORAL DAILY
COMMUNITY

## 2019-09-03 RX ORDER — LEVOTHYROXINE SODIUM 0.12 MG/1
TABLET ORAL
Qty: 30 TABLET | Refills: 1 | Status: SHIPPED | OUTPATIENT
Start: 2019-09-03 | End: 2019-09-19 | Stop reason: SDUPTHER

## 2019-09-03 RX ORDER — PHENOL 1.4 %
600 AEROSOL, SPRAY (ML) MUCOUS MEMBRANE DAILY
COMMUNITY

## 2019-09-03 NOTE — PROGRESS NOTES
Diamniotic dichorionic twins, 23 weeks and 4 days gestation, transfer to our practice  History of echogenic foci  Now absent  Patient has question about the need for echocardiogram   History of hypothyroidism  The patient is currently taking low-dose aspirin, folic acid, and thyroid replacement medication

## 2019-09-03 NOTE — PATIENT INSTRUCTIONS
Transfer to our practice, IVF pregnancy, dichorionic diamniotic twins, AMA, hypothyroidism, prior evidence of intracardiac echogenic foci, now absent, patient questions the need for fetal echo

## 2019-09-03 NOTE — PROGRESS NOTES
- Transfer from Dr Sanchez Pair 23 4/7 wk - TWINS (dichorionic/diamniotic), (male x 2) - IVF (3rd attempt) - this pregnancy egg donor (age 24)  Will add folic acid, taking calcium, magnesium, vit E, supp fish oil  Had Level 2 U/S 19  Did not have thyroid labs or glucose beg of preg - ordered 1 hr glucose, TSH, T4 free, CBC, vit D ([facility])  Has f/u [facility] 19  Has appt for fetal echo 19 although PNC report 19 says resolved echogenic focus baby A  recom TDAP @ 28 wk  Urine negative today glucose & protein

## 2019-09-18 LAB
GLUCOSE 1H P 50 G GLC PO SERPL-MCNC: 102 MG/DL
T4 FREE SERPL-MCNC: 1.1 NG/DL (ref 0.8–1.8)
TSH SERPL-ACNC: 0.02 MIU/L

## 2019-09-19 DIAGNOSIS — E03.9 HYPOTHYROIDISM AFFECTING PREGNANCY IN FIRST TRIMESTER: ICD-10-CM

## 2019-09-19 DIAGNOSIS — O21.9 NAUSEA AND VOMITING DURING PREGNANCY: ICD-10-CM

## 2019-09-19 DIAGNOSIS — O99.281 HYPOTHYROIDISM AFFECTING PREGNANCY IN FIRST TRIMESTER: ICD-10-CM

## 2019-09-19 RX ORDER — ONDANSETRON 8 MG/1
8 TABLET, ORALLY DISINTEGRATING ORAL EVERY 8 HOURS PRN
Qty: 20 TABLET | Refills: 1 | Status: SHIPPED | OUTPATIENT
Start: 2019-09-19 | End: 2019-10-07 | Stop reason: SDUPTHER

## 2019-09-19 RX ORDER — LEVOTHYROXINE SODIUM 0.12 MG/1
125 TABLET ORAL DAILY
Qty: 30 TABLET | Refills: 1 | Status: SHIPPED | OUTPATIENT
Start: 2019-09-19 | End: 2019-09-20

## 2019-09-19 NOTE — TELEPHONE ENCOUNTER
Pt called in requesting refills of Levothyroxine and Zofran  Rx sent to [facility] to sign

## 2019-09-20 ENCOUNTER — TELEPHONE (OUTPATIENT)
Dept: OBGYN CLINIC | Facility: CLINIC | Age: 44
End: 2019-09-20

## 2019-09-20 DIAGNOSIS — O99.282 HYPOTHYROID IN PREGNANCY, ANTEPARTUM, SECOND TRIMESTER: ICD-10-CM

## 2019-09-20 DIAGNOSIS — E03.9 HYPOTHYROID IN PREGNANCY, ANTEPARTUM, SECOND TRIMESTER: ICD-10-CM

## 2019-09-20 DIAGNOSIS — E03.9 HYPOTHYROIDISM AFFECTING PREGNANCY IN FIRST TRIMESTER: Primary | ICD-10-CM

## 2019-09-20 DIAGNOSIS — E03.9 HYPOTHYROID IN PREGNANCY, ANTEPARTUM, SECOND TRIMESTER: Primary | ICD-10-CM

## 2019-09-20 DIAGNOSIS — O99.281 HYPOTHYROIDISM AFFECTING PREGNANCY IN FIRST TRIMESTER: Primary | ICD-10-CM

## 2019-09-20 DIAGNOSIS — O99.282 HYPOTHYROID IN PREGNANCY, ANTEPARTUM, SECOND TRIMESTER: Primary | ICD-10-CM

## 2019-09-20 RX ORDER — LEVOTHYROXINE SODIUM 0.07 MG/1
75 TABLET ORAL DAILY
Qty: 30 TABLET | Refills: 1 | Status: SHIPPED | OUTPATIENT
Start: 2019-09-20 | End: 2019-09-20 | Stop reason: ALTCHOICE

## 2019-09-20 RX ORDER — LEVOTHYROXINE SODIUM 0.07 MG/1
75 TABLET ORAL DAILY
Qty: 30 TABLET | Refills: 1 | Status: ON HOLD | OUTPATIENT
Start: 2019-09-20 | End: 2019-11-28

## 2019-09-20 NOTE — TELEPHONE ENCOUNTER
Called patient to inform her she would need to contact Dr Jaime olmstead for thyroid management as note per her chart states she has transferred out to Dr Jaime Wall , patient state she is not staying with Dr Jaime olmstead but coming back to see Dr Hilary Gomez on 09/27/2019 at the [facility] office , advised patient in order to keep up with her thyroid levels and results she will needto be consitant in seeing one practice for her OB care , [place] will review results and order thyroid medication as needed    MM, CMA

## 2019-09-20 NOTE — TELEPHONE ENCOUNTER
Pt called very upset over thyroid levels  States she just saw her thyroid levels on the computer and is upset over the results  Noticed in chart patient had switched OB care provider at 23 weeks to another practice  Advised patient to call other practice to review her results and manage her thyroid

## 2019-09-23 ENCOUNTER — ULTRASOUND (OUTPATIENT)
Dept: PERINATAL CARE | Facility: CLINIC | Age: 44
End: 2019-09-23
Payer: COMMERCIAL

## 2019-09-23 VITALS
DIASTOLIC BLOOD PRESSURE: 83 MMHG | WEIGHT: 139.2 LBS | HEART RATE: 101 BPM | SYSTOLIC BLOOD PRESSURE: 127 MMHG | HEIGHT: 62 IN | BODY MASS INDEX: 25.62 KG/M2

## 2019-09-23 DIAGNOSIS — O09.512 AMA (ADVANCED MATERNAL AGE) PRIMIGRAVIDA 35+, SECOND TRIMESTER: Primary | ICD-10-CM

## 2019-09-23 DIAGNOSIS — O30.009 TWIN PREGNANCY RESULTING FROM ASSISTED REPRODUCTIVE TECHNOLOGY (ART): ICD-10-CM

## 2019-09-23 DIAGNOSIS — O09.819 TWIN PREGNANCY RESULTING FROM ASSISTED REPRODUCTIVE TECHNOLOGY (ART): ICD-10-CM

## 2019-09-23 DIAGNOSIS — Z3A.26 26 WEEKS GESTATION OF PREGNANCY: ICD-10-CM

## 2019-09-23 PROBLEM — O35.BXX1 ECHOGENIC FOCUS OF HEART OF FETUS AFFECTING ANTEPARTUM CARE OF MOTHER, FETUS 1: Status: RESOLVED | Noted: 2019-06-28 | Resolved: 2019-09-23

## 2019-09-23 PROBLEM — O35.8XX1 ECHOGENIC FOCUS OF HEART OF FETUS AFFECTING ANTEPARTUM CARE OF MOTHER, FETUS 1: Status: RESOLVED | Noted: 2019-06-28 | Resolved: 2019-09-23

## 2019-09-23 PROCEDURE — 76816 OB US FOLLOW-UP PER FETUS: CPT | Performed by: OBSTETRICS & GYNECOLOGY

## 2019-09-23 PROCEDURE — 99212 OFFICE O/P EST SF 10 MIN: CPT | Performed by: OBSTETRICS & GYNECOLOGY

## 2019-09-23 NOTE — PROGRESS NOTES
[address]: Ms Aishwarya Mahmood was seen today at 26w3d for fetal growth assessment ultrasound  See ultrasound report under "OB Procedures" tab  Please don't hesitate to contact our office with any concerns or questions    Clay Perales MD

## 2019-09-23 NOTE — PATIENT INSTRUCTIONS
Thank you for choosing [address] [facility] for your  care today  If you have any questions about your ultrasound or care, please do not hesitate to contact us or your primary obstetrician  Continue taking your aspirin 162mg daily for prevention of preeclampsia  If you have asthma and notice an increase in symptom frequency or severity, consider stopping this medication

## 2019-09-27 ENCOUNTER — TELEPHONE (OUTPATIENT)
Dept: OBGYN CLINIC | Facility: CLINIC | Age: 44
End: 2019-09-27

## 2019-09-27 ENCOUNTER — ROUTINE PRENATAL (OUTPATIENT)
Dept: OBGYN CLINIC | Facility: CLINIC | Age: 44
End: 2019-09-27

## 2019-09-27 VITALS
BODY MASS INDEX: 25.98 KG/M2 | DIASTOLIC BLOOD PRESSURE: 76 MMHG | WEIGHT: 141.2 LBS | SYSTOLIC BLOOD PRESSURE: 110 MMHG | HEIGHT: 62 IN

## 2019-09-27 DIAGNOSIS — O09.819 TWIN PREGNANCY RESULTING FROM ASSISTED REPRODUCTIVE TECHNOLOGY (ART): ICD-10-CM

## 2019-09-27 DIAGNOSIS — O09.512 AMA (ADVANCED MATERNAL AGE) PRIMIGRAVIDA 35+, SECOND TRIMESTER: ICD-10-CM

## 2019-09-27 DIAGNOSIS — O09.93 SUPERVISION OF HIGH RISK PREGNANCY, ANTEPARTUM, THIRD TRIMESTER: ICD-10-CM

## 2019-09-27 DIAGNOSIS — O30.009 TWIN PREGNANCY RESULTING FROM ASSISTED REPRODUCTIVE TECHNOLOGY (ART): ICD-10-CM

## 2019-09-27 PROBLEM — Z3A.27 27 WEEKS GESTATION OF PREGNANCY: Status: ACTIVE | Noted: 2019-06-28

## 2019-09-27 PROCEDURE — PNV: Performed by: OBSTETRICS & GYNECOLOGY

## 2019-09-27 NOTE — PROGRESS NOTES
Visit:  Good FM - would like to have primary c/s at 36 plus weeks at 12/3 - will check with [facility] - Ts 152/145

## 2019-09-27 NOTE — TELEPHONE ENCOUNTER
----- Message from Antoinette Bragg MD sent at 9/27/2019  2:50 PM EDT -----  Please write a note to immigration that Yony mother will be needed to come and stay for 5 months to assist with the care of the twins due to c/s - Deedee Soto   [facility]

## 2019-10-03 ENCOUNTER — ROUTINE PRENATAL (OUTPATIENT)
Dept: PERINATAL CARE | Facility: OTHER | Age: 44
End: 2019-10-03
Payer: COMMERCIAL

## 2019-10-03 VITALS
HEIGHT: 62 IN | HEART RATE: 99 BPM | BODY MASS INDEX: 25.76 KG/M2 | DIASTOLIC BLOOD PRESSURE: 78 MMHG | SYSTOLIC BLOOD PRESSURE: 115 MMHG | WEIGHT: 140 LBS

## 2019-10-03 DIAGNOSIS — O40.2XX2 POLYHYDRAMNIOS, SECOND TRIMESTER, FETUS 2: ICD-10-CM

## 2019-10-03 DIAGNOSIS — O09.812 PREGNANCY RESULTING FROM IN VITRO FERTILIZATION, SECOND TRIMESTER: ICD-10-CM

## 2019-10-03 DIAGNOSIS — O30.042 DICHORIONIC DIAMNIOTIC TWIN PREGNANCY IN SECOND TRIMESTER: ICD-10-CM

## 2019-10-03 DIAGNOSIS — Z3A.27 27 WEEKS GESTATION OF PREGNANCY: Primary | ICD-10-CM

## 2019-10-03 PROCEDURE — 76825 ECHO EXAM OF FETAL HEART: CPT | Performed by: OBSTETRICS & GYNECOLOGY

## 2019-10-03 PROCEDURE — 76827 ECHO EXAM OF FETAL HEART: CPT | Performed by: OBSTETRICS & GYNECOLOGY

## 2019-10-03 NOTE — LETTER
October 5, 2019     Newton Bueno MD  [address]    Patient: Fabiana Garcia   YOB: 1975   Date of Visit: 10/3/2019       Dear Dr Nathan Rockwell: Thank you for referring Fabiana Garcia to me for evaluation  Below are my notes for this consultation  If you have questions, please do not hesitate to call me  I look forward to following your patient along with you  Sincerely,        Georgette Tate MD        CC: No Recipients  Georgette Tate MD  10/5/2019 12:58 PM  Sign at close encounter  Please refer to the [facility] ultrasound report in Ob Procedures for additional information regarding the visit to the [facility]  today

## 2019-10-05 PROBLEM — O09.812 PREGNANCY RESULTING FROM IN VITRO FERTILIZATION, SECOND TRIMESTER: Status: ACTIVE | Noted: 2019-10-05

## 2019-10-05 PROBLEM — O40.2XX2 POLYHYDRAMNIOS, SECOND TRIMESTER, FETUS 2: Status: ACTIVE | Noted: 2019-10-05

## 2019-10-05 PROBLEM — O30.042 DICHORIONIC DIAMNIOTIC TWIN PREGNANCY IN SECOND TRIMESTER: Status: ACTIVE | Noted: 2019-10-05

## 2019-10-05 NOTE — PROGRESS NOTES
Please refer to the [facility] ultrasound report in Ob Procedures for additional information regarding the visit to the [facility]  today

## 2019-10-07 DIAGNOSIS — O21.9 NAUSEA AND VOMITING DURING PREGNANCY: ICD-10-CM

## 2019-10-07 RX ORDER — ONDANSETRON 8 MG/1
8 TABLET, ORALLY DISINTEGRATING ORAL EVERY 8 HOURS PRN
Qty: 20 TABLET | Refills: 1 | Status: SHIPPED | OUTPATIENT
Start: 2019-10-07 | End: 2019-10-28 | Stop reason: SDUPTHER

## 2019-10-18 ENCOUNTER — ROUTINE PRENATAL (OUTPATIENT)
Dept: OBGYN CLINIC | Facility: CLINIC | Age: 44
End: 2019-10-18
Payer: COMMERCIAL

## 2019-10-18 VITALS — BODY MASS INDEX: 26.34 KG/M2 | WEIGHT: 144 LBS | DIASTOLIC BLOOD PRESSURE: 72 MMHG | SYSTOLIC BLOOD PRESSURE: 102 MMHG

## 2019-10-18 DIAGNOSIS — Z23 NEED FOR TDAP VACCINATION: ICD-10-CM

## 2019-10-18 DIAGNOSIS — O30.042 DICHORIONIC DIAMNIOTIC TWIN PREGNANCY IN SECOND TRIMESTER: Primary | ICD-10-CM

## 2019-10-18 DIAGNOSIS — G56.00 CARPAL TUNNEL SYNDROME DURING PREGNANCY: ICD-10-CM

## 2019-10-18 DIAGNOSIS — O26.899 CARPAL TUNNEL SYNDROME DURING PREGNANCY: ICD-10-CM

## 2019-10-18 PROCEDURE — 90715 TDAP VACCINE 7 YRS/> IM: CPT | Performed by: PHYSICIAN ASSISTANT

## 2019-10-18 PROCEDURE — PNV: Performed by: PHYSICIAN ASSISTANT

## 2019-10-18 PROCEDURE — 90471 IMMUNIZATION ADMIN: CPT | Performed by: PHYSICIAN ASSISTANT

## 2019-10-18 RX ORDER — LEVOTHYROXINE SODIUM 0.12 MG/1
TABLET ORAL
COMMUNITY
Start: 2019-09-25 | End: 2019-10-30 | Stop reason: ALTCHOICE

## 2019-10-18 NOTE — PROGRESS NOTES
Visit: Good FM, No N/V, HA, cramping, VB, LOF, edema, domestic violence, or smoking  Tolerating PNV  Dr Pily Payan recommended patient have  at 42 weeks on 12/3/2019  Will send task to schedule  Patient reports carpal tunnel worsening even with using wrist bands  Script for physical therapy given  Tdap given today  30 wk packet given, reviewed checking in appt  Continue routine prenatal care  Return to office in 2 weeks for ob check

## 2019-10-22 ENCOUNTER — ULTRASOUND (OUTPATIENT)
Dept: PERINATAL CARE | Facility: CLINIC | Age: 44
End: 2019-10-22
Payer: COMMERCIAL

## 2019-10-22 VITALS
HEART RATE: 82 BPM | DIASTOLIC BLOOD PRESSURE: 78 MMHG | BODY MASS INDEX: 27.12 KG/M2 | HEIGHT: 62 IN | SYSTOLIC BLOOD PRESSURE: 111 MMHG | WEIGHT: 147.4 LBS

## 2019-10-22 DIAGNOSIS — O30.043 DICHORIONIC DIAMNIOTIC TWIN PREGNANCY IN THIRD TRIMESTER: Primary | ICD-10-CM

## 2019-10-22 DIAGNOSIS — O09.513 AMA (ADVANCED MATERNAL AGE) PRIMIGRAVIDA 35+, THIRD TRIMESTER: ICD-10-CM

## 2019-10-22 DIAGNOSIS — O21.9 NAUSEA AND VOMITING DURING PREGNANCY: ICD-10-CM

## 2019-10-22 DIAGNOSIS — O40.2XX2 POLYHYDRAMNIOS, SECOND TRIMESTER, FETUS 2: ICD-10-CM

## 2019-10-22 DIAGNOSIS — O09.819 TWIN PREGNANCY RESULTING FROM ASSISTED REPRODUCTIVE TECHNOLOGY (ART): ICD-10-CM

## 2019-10-22 DIAGNOSIS — O09.91 SUPERVISION OF HIGH RISK PREGNANCY, ANTEPARTUM, FIRST TRIMESTER: ICD-10-CM

## 2019-10-22 DIAGNOSIS — O30.009 TWIN PREGNANCY RESULTING FROM ASSISTED REPRODUCTIVE TECHNOLOGY (ART): ICD-10-CM

## 2019-10-22 DIAGNOSIS — Z3A.30 30 WEEKS GESTATION OF PREGNANCY: ICD-10-CM

## 2019-10-22 PROCEDURE — 99212 OFFICE O/P EST SF 10 MIN: CPT | Performed by: OBSTETRICS & GYNECOLOGY

## 2019-10-22 PROCEDURE — 76819 FETAL BIOPHYS PROFIL W/O NST: CPT | Performed by: OBSTETRICS & GYNECOLOGY

## 2019-10-22 PROCEDURE — 76816 OB US FOLLOW-UP PER FETUS: CPT | Performed by: OBSTETRICS & GYNECOLOGY

## 2019-10-22 NOTE — PROGRESS NOTES
The patient was seen today for an ultrasound  Please see ultrasound report (located under Ob Procedures) for additional details  Thank you very much for allowing us to participate in the care of this very nice patient  Should you have any questions, please do not hesitate to contact me  Nathan Gillis MD [address]  Attending Physician, [place]

## 2019-10-24 LAB
T4 FREE SERPL-MCNC: 0.9 NG/DL (ref 0.8–1.8)
TSH SERPL-ACNC: 1.59 MIU/L

## 2019-10-28 DIAGNOSIS — O21.9 NAUSEA AND VOMITING DURING PREGNANCY: ICD-10-CM

## 2019-10-28 RX ORDER — ONDANSETRON 8 MG/1
8 TABLET, ORALLY DISINTEGRATING ORAL EVERY 8 HOURS PRN
Qty: 20 TABLET | Refills: 1 | Status: SHIPPED | OUTPATIENT
Start: 2019-10-28

## 2019-10-29 ENCOUNTER — EVALUATION (OUTPATIENT)
Dept: OCCUPATIONAL THERAPY | Facility: CLINIC | Age: 44
End: 2019-10-29
Payer: COMMERCIAL

## 2019-10-29 DIAGNOSIS — O26.899 CARPAL TUNNEL SYNDROME DURING PREGNANCY: ICD-10-CM

## 2019-10-29 DIAGNOSIS — O26.899 CARPAL TUNNEL SYNDROME DURING PREGNANCY: Primary | ICD-10-CM

## 2019-10-29 DIAGNOSIS — G56.00 CARPAL TUNNEL SYNDROME DURING PREGNANCY: ICD-10-CM

## 2019-10-29 DIAGNOSIS — G56.00 CARPAL TUNNEL SYNDROME DURING PREGNANCY: Primary | ICD-10-CM

## 2019-10-29 PROCEDURE — 97165 OT EVAL LOW COMPLEX 30 MIN: CPT | Performed by: OCCUPATIONAL THERAPIST

## 2019-10-29 PROCEDURE — 97110 THERAPEUTIC EXERCISES: CPT | Performed by: OCCUPATIONAL THERAPIST

## 2019-10-29 NOTE — PROGRESS NOTES
Alyx from Physical therapy called regarding the referral in the patients chart for today's appt  The referral needed to be changed to occupational therapy  The change was made

## 2019-10-29 NOTE — PROGRESS NOTES
OT Evaluation     Today's date: 10/29/2019  Patient name: Patricia Napier  : 1975  MRN: [id number]  Referring provider: FARHAT Ruiz*  Dx:   Encounter Diagnosis     ICD-10-CM    1  Carpal tunnel syndrome during pregnancy O26 899 Ambulatory referral to Physical Therapy    G56 00                   Assessment  Assessment details: Rani Nettles is a right hand dominant female that presents with symptom onset for 3 weeks  She presents with clinical findings of bilateral carpal tunnel syndrome, worse on the right side  This is affecting her sensory function as well as her strength  She is using bilateral wrist braces at nighttime  She is also demonstrating a 1st dorsal compartment irritation on the right side, or deQuervain's tendonitis  Education on proper positioning and joint mechanics will be important for her, as these symptoms may worsen when her twins arise in about a month  She will benefit from skilled occupational therapy to decrease neural irritation, improve nerve gliding, decrease 1st dorsal compartment pain, and educate on body mechanics  See below for a detailed assessment  Impairments: activity intolerance, impaired physical strength, lacks appropriate home exercise program and pain with function    Symptom irritability: moderate  Goals  STG: Patient will be compliant ergonomic modifications and home exercise program in 1 week  STG: Pain will be reduced by 2 subjective levels in 3 weeks  STG: Subjective reports of numbness and tingling will be reduced in 3 weeks  STG: Strength will be improved by 25% in 4 weeks  LTG: Strength will be improved by 50% in 6-8 weeks or by discharge  LTG: Performance in ADLs and IADLS will be improved to prior level of function with the affected extremity within 6 weeks  LTG: Subjective reports of numbness and tingling will be reduced by discharge  LTG: FOTO score increase by 20 points within 6 weeks             Plan  Plan details: Treatment to include modalities, manual therapy, PRE's, HEP, and orthotics as appropriate  Patient would benefit from: skilled OT and OT eval  Planned modality interventions: thermotherapy: hydrocollator packs, fluidotherapy, ultrasound and thermotherapy: paraffin bath  Planned therapy interventions: home exercise program, joint mobilization, manual therapy, therapeutic exercise, patient education, therapeutic activities and sensory integrative techniques  Other planned therapy interventions: nerve gliding, kinesiotape   Frequency: 2x week  Duration in visits: 10  Plan of Care beginning date: 10/29/2019  Plan of Care expiration date: 12/10/2019  Treatment plan discussed with: patient        Subjective Evaluation    History of Present Illness  Mechanism of injury: She is a right hand dominant female who complains of symptoms of pain and numbness and tingling that began 3 weeks ago  She reports right worse than left symptoms, and worse at night  She has been wearing bilateral wrist braces at night     Pain  Current pain ratin  At best pain ratin  At worst pain ratin  Quality: burning and discomfort    Hand dominance: right    Patient Goals  Patient goals for therapy: decreased pain, independence with ADLs/IADLs and increased strength  Patient goal: Decreased numbness and tingling         Objective     Neurological Testing     Sensation     Wrist/Hand   Left   Intact: light touch    Right   Diminished: light touch    Comments   Left light touch: 2 83 all digits   Right light touch: 3 61 to IF, all others 2 83    Strength/Myotome Testing     Left Wrist/Hand      (2nd hand position)     Trial 1: 19 9    Thumb Strength  Key/Lateral Pinch     [place] 1: 9  Palmar/Three-Point Pinch     Trial 1: 7    Right Wrist/Hand      (2nd hand position)     Trial 1: 25 8    Thumb Strength   Key/Lateral Pinch     Trial 1: 14 9  Palmar/Three-Point Pinch     Trial 1: 7 3    Tests     Left Elbow   Negative elbow flexion and Tinel's sign (cubital tunnel)  Right Elbow   Negative elbow flexion and Tinel's sign (cubital tunnel)  Left Wrist/Hand   Negative Finkelstein's, Phalen's sign and Tinel's sign (medial nerve)  Right Wrist/Hand   Positive Finkelstein's and Tinel's sign (medial nerve)  Negative Phalen's sign       Additional Tests Details  - cervical screen   R:  +APL RROM, -EPB, -EPL  +Lumbrical excursion paresthesias    L:  +Lumbrical excursion paresthesias  -APL RROM,                    Precautions: Pregnancy       Manual  10/29            IASTM R 1st dorsal compartment             KT R 1st dorsal compartment X            Supine MNG                                           Exercise Diary  10/29            HEP: MNG proximal and distal, extrinsic stretches  Issued                                                                                                                                                                                                                                                                       Modalities  10/29            [facility] 5'            Fluido R

## 2019-11-05 ENCOUNTER — ULTRASOUND (OUTPATIENT)
Dept: PERINATAL CARE | Facility: CLINIC | Age: 44
End: 2019-11-05
Payer: COMMERCIAL

## 2019-11-05 ENCOUNTER — TELEPHONE (OUTPATIENT)
Dept: PERINATAL CARE | Facility: CLINIC | Age: 44
End: 2019-11-05

## 2019-11-05 ENCOUNTER — ROUTINE PRENATAL (OUTPATIENT)
Dept: OBGYN CLINIC | Facility: CLINIC | Age: 44
End: 2019-11-05

## 2019-11-05 VITALS — SYSTOLIC BLOOD PRESSURE: 102 MMHG | DIASTOLIC BLOOD PRESSURE: 60 MMHG | WEIGHT: 148 LBS | BODY MASS INDEX: 27.07 KG/M2

## 2019-11-05 VITALS
HEIGHT: 62 IN | DIASTOLIC BLOOD PRESSURE: 78 MMHG | BODY MASS INDEX: 27.23 KG/M2 | WEIGHT: 148 LBS | HEART RATE: 99 BPM | SYSTOLIC BLOOD PRESSURE: 116 MMHG

## 2019-11-05 DIAGNOSIS — O30.043 DICHORIONIC DIAMNIOTIC TWIN PREGNANCY IN THIRD TRIMESTER: ICD-10-CM

## 2019-11-05 DIAGNOSIS — O09.812 PREGNANCY RESULTING FROM IN VITRO FERTILIZATION, SECOND TRIMESTER: ICD-10-CM

## 2019-11-05 DIAGNOSIS — O09.513 AMA (ADVANCED MATERNAL AGE) PRIMIGRAVIDA 35+, THIRD TRIMESTER: ICD-10-CM

## 2019-11-05 DIAGNOSIS — Z3A.32 32 WEEKS GESTATION OF PREGNANCY: Primary | ICD-10-CM

## 2019-11-05 DIAGNOSIS — O30.009 TWIN PREGNANCY RESULTING FROM ASSISTED REPRODUCTIVE TECHNOLOGY (ART): ICD-10-CM

## 2019-11-05 DIAGNOSIS — Z3A.32 32 WEEKS GESTATION OF PREGNANCY: ICD-10-CM

## 2019-11-05 DIAGNOSIS — O09.819 TWIN PREGNANCY RESULTING FROM ASSISTED REPRODUCTIVE TECHNOLOGY (ART): ICD-10-CM

## 2019-11-05 PROBLEM — Z3A.30 30 WEEKS GESTATION OF PREGNANCY: Status: ACTIVE | Noted: 2019-06-28

## 2019-11-05 PROCEDURE — 76815 OB US LIMITED FETUS(S): CPT | Performed by: OBSTETRICS & GYNECOLOGY

## 2019-11-05 PROCEDURE — 59025 FETAL NON-STRESS TEST: CPT | Performed by: OBSTETRICS & GYNECOLOGY

## 2019-11-05 PROCEDURE — PNV: Performed by: NURSE PRACTITIONER

## 2019-11-05 NOTE — LETTER
NST sleeve cover sheet    Patient name: Wan Dejessu  : 1975  MRN: [id number]    LORETO: Estimated Date of Delivery: 19    Obstetrician: ___Lauragey ____________________________    Reason(s) for testing:  _____________________DI DI Twins _____________________      Testing frequency:    ___ 2x/wk  __X_ 1x/wk  ___ Dopplers  ___ BPP?       Last growth scan: __________________________________________

## 2019-11-05 NOTE — PROGRESS NOTES
Fetal ultrasound examination for evaluation of YASSINE and NST at  32 4 /7 weeks gestation  Hx of    Dichorionic Diamniotic twin pregnancy  She is asymptomatic  See Ob procedures in EPIC  1  YASSINE   wnl in both fetuses by MVP  2  NST reactive for both fetuses      Recommendations; 1  Twice a week NSTs, once a week YASSINE  2  Fetal movement counts  Thank you for referring your patient to our offices  If you have any further questions do not hesitate to contact us as [phone]      Juliana Rodríguez MD

## 2019-11-05 NOTE — PATIENT INSTRUCTIONS

## 2019-11-05 NOTE — LETTER
Paola Overton  MRN: [id number] MRN: [id number] MRN: [id number]  : [birth year]  LORETO/GA: LORETO/GA: LORETO/GA:  Date:  Date:  Date:     Edis Perez  MRN: [id number] MRN: [id number] MRN: [id number]  : [birth year]  LORETO/GA: LORETO/GA: LORETO/GA:  Date:  Date:  Date:     Edis Perez  MRN: [id number] MRN: [id number] MRN: [id number]  : [birth year]  LORETO/GA: LORETO/GA: LORETO/GA:  Date:  Date:  Date:     Edis Perez  MRN: [id number] MRN: [id number] MRN: [id number]  : [birth year]  LORETO/GA: LORETO/GA: LORETO/GA:  Date:  Date:  Date:     Edis Perez  MRN: [id number] MRN: [id number] MRN: [id number]  : [birth year]  LORETO/GA: LORETO/GA: LORETO/GA:  Date:  Date:  Date:     Edis Perez  MRN: [id number] MRN: [id number] MRN: [id number]  : [birth year]  LORETO/GA: LORETO/GA: LORETO/GA:  Date:  Date:  Date:     Edis Perez  MRN: [id number] MRN: [id number] MRN: [id number]  : [birth year]  LORETO/GA: LORETO/GA: LORETO/GA:  Date:  Date:  Date:

## 2019-11-06 ENCOUNTER — OFFICE VISIT (OUTPATIENT)
Dept: OCCUPATIONAL THERAPY | Facility: CLINIC | Age: 44
End: 2019-11-06
Payer: COMMERCIAL

## 2019-11-06 DIAGNOSIS — O26.899 CARPAL TUNNEL SYNDROME DURING PREGNANCY: Primary | ICD-10-CM

## 2019-11-06 DIAGNOSIS — G56.00 CARPAL TUNNEL SYNDROME DURING PREGNANCY: Primary | ICD-10-CM

## 2019-11-06 PROCEDURE — 97022 WHIRLPOOL THERAPY: CPT | Performed by: OCCUPATIONAL THERAPIST

## 2019-11-06 PROCEDURE — 97140 MANUAL THERAPY 1/> REGIONS: CPT | Performed by: OCCUPATIONAL THERAPIST

## 2019-11-06 NOTE — PROGRESS NOTES
Daily Note     Today's date: 2019  Patient name: Chel Guillen  : 1975  MRN: [id number]  Referring provider: FARHAT Goodson*  Dx:   Encounter Diagnosis     ICD-10-CM    1  Carpal tunnel syndrome during pregnancy O26 899     G56 00                   Subjective: "this is embarrassing"      Objective: See treatment diary below  15 minutes late to therapy today  Assessment: 1st dorsal compartment irritation has worsened in the left wrist  She states however that median nerve pain and burning sensation is less  Plan: Progress treatment as tolerated         Precautions: Pregnancy       Manual  10/29 11/6           IASTM R 1st dorsal compartment  B/L 10           KT R 1st dorsal compartment X B/L           Supine MNG  10'                                         Exercise Diary  10/29            HEP: MNG proximal and distal, extrinsic stretches  Issued                                                                                                                                                                                                                                                                       Modalities  10/29 11/6           [facility] 5'            Fluido R  10'

## 2019-11-06 NOTE — PROGRESS NOTES
OFFICE VISIT: Denies any N/V, HA, Cramping, VB, LOF, Edema, Domestic Violence, Smoking  + FM  Tolerating PNV  Pt complaining of bilateral carpal tunnel  Following with PT who recently examined her and stated location of pain is not carpal tunnel  She points to area of pain at thumb joint on bilateral hands  Discussed possibility of DeQuervain tendinitis due to location, recommend follow up with PCP  Discussed may need different brace  Spoke about reducing texting as motion can exacerbate those tendons  Can ice and rest  Tylenol as needed for pain  Pt following with [facility] for [facility]FS  Pt would like to discuss delivery with Dr Rosario Kelly  Previous plan was for  at 36 weeks but MFM just expressed to her waiting till 40  Reviewed reasons for waiting till 37 weeks with patient  Disucssed late  infants potenital risks of hypoglycemia, difficulty maintaining temperatures, feeding difficulties, lung maturity, etc  She is aware but would like to discuss possibility of delivering at 36 weeks with Dr Rosario Kelly  Recommend scheduling next appointment with Dr Rosario Kelly so she can discuss delivery     FHR Baby A 154bpm (Maternal right lower abdomen)  FHR Baby B 142bpm (Maternal mid left abdomen)

## 2019-11-08 ENCOUNTER — ROUTINE PRENATAL (OUTPATIENT)
Dept: PERINATAL CARE | Facility: CLINIC | Age: 44
End: 2019-11-08
Payer: COMMERCIAL

## 2019-11-08 ENCOUNTER — APPOINTMENT (OUTPATIENT)
Dept: OCCUPATIONAL THERAPY | Facility: CLINIC | Age: 44
End: 2019-11-08
Payer: COMMERCIAL

## 2019-11-08 VITALS
BODY MASS INDEX: 27.97 KG/M2 | HEART RATE: 84 BPM | DIASTOLIC BLOOD PRESSURE: 82 MMHG | SYSTOLIC BLOOD PRESSURE: 121 MMHG | WEIGHT: 152 LBS | HEIGHT: 62 IN

## 2019-11-08 DIAGNOSIS — O09.513 AMA (ADVANCED MATERNAL AGE) PRIMIGRAVIDA 35+, THIRD TRIMESTER: ICD-10-CM

## 2019-11-08 DIAGNOSIS — O30.043 DICHORIONIC DIAMNIOTIC TWIN PREGNANCY IN THIRD TRIMESTER: ICD-10-CM

## 2019-11-08 DIAGNOSIS — Z3A.32 32 WEEKS GESTATION OF PREGNANCY: ICD-10-CM

## 2019-11-08 PROCEDURE — 59025 FETAL NON-STRESS TEST: CPT | Performed by: OBSTETRICS & GYNECOLOGY

## 2019-11-08 NOTE — LETTER
NST sleeve cover sheet    Patient name: Ainket Padilla  : 1975  MRN: [id number]    LORETO: Estimated Date of Delivery: 19    Obstetrician: _______________________________    Reason(s) for testing:  __________________________________________      Testing frequency:    ___ 2x/wk  ___ 1x/wk  ___ Dopplers  ___ BPP?       Last growth scan: __________________________________________

## 2019-11-08 NOTE — PATIENT INSTRUCTIONS

## 2019-11-11 ENCOUNTER — ROUTINE PRENATAL (OUTPATIENT)
Dept: OBGYN CLINIC | Facility: CLINIC | Age: 44
End: 2019-11-11

## 2019-11-11 VITALS — WEIGHT: 152.2 LBS | BODY MASS INDEX: 27.84 KG/M2 | SYSTOLIC BLOOD PRESSURE: 118 MMHG | DIASTOLIC BLOOD PRESSURE: 62 MMHG

## 2019-11-11 DIAGNOSIS — Z3A.33 33 WEEKS GESTATION OF PREGNANCY: ICD-10-CM

## 2019-11-11 DIAGNOSIS — O09.513 AMA (ADVANCED MATERNAL AGE) PRIMIGRAVIDA 35+, THIRD TRIMESTER: ICD-10-CM

## 2019-11-11 DIAGNOSIS — O30.043 DICHORIONIC DIAMNIOTIC TWIN PREGNANCY IN THIRD TRIMESTER: ICD-10-CM

## 2019-11-11 PROCEDURE — PNV: Performed by: OBSTETRICS & GYNECOLOGY

## 2019-11-11 NOTE — PROGRESS NOTES
Patient reports good fm, no  bleeding, loss of fluid, dom violence, or smoking  brandee pnv patient reports pnc requested exam due to ctx on monitor  Cervix soft and post, ft/60/-1, patient very uncomfortable with exam, urine neg/neg, return in 2 weeks for gbs then weekly visits

## 2019-11-12 ENCOUNTER — TELEPHONE (OUTPATIENT)
Dept: PERINATAL CARE | Facility: CLINIC | Age: 44
End: 2019-11-12

## 2019-11-12 ENCOUNTER — ULTRASOUND (OUTPATIENT)
Dept: PERINATAL CARE | Facility: CLINIC | Age: 44
End: 2019-11-12
Payer: COMMERCIAL

## 2019-11-12 VITALS
SYSTOLIC BLOOD PRESSURE: 125 MMHG | HEIGHT: 62 IN | WEIGHT: 152.4 LBS | BODY MASS INDEX: 28.05 KG/M2 | DIASTOLIC BLOOD PRESSURE: 79 MMHG | HEART RATE: 86 BPM

## 2019-11-12 DIAGNOSIS — O30.043 DICHORIONIC DIAMNIOTIC TWIN PREGNANCY IN THIRD TRIMESTER: Primary | ICD-10-CM

## 2019-11-12 DIAGNOSIS — R04.0 EPISTAXIS: ICD-10-CM

## 2019-11-12 DIAGNOSIS — Z3A.32 32 WEEKS GESTATION OF PREGNANCY: ICD-10-CM

## 2019-11-12 DIAGNOSIS — O09.513 AMA (ADVANCED MATERNAL AGE) PRIMIGRAVIDA 35+, THIRD TRIMESTER: ICD-10-CM

## 2019-11-12 PROCEDURE — 76815 OB US LIMITED FETUS(S): CPT | Performed by: OBSTETRICS & GYNECOLOGY

## 2019-11-12 PROCEDURE — NC001 PR NO CHARGE: Performed by: OBSTETRICS & GYNECOLOGY

## 2019-11-12 PROCEDURE — 59025 FETAL NON-STRESS TEST: CPT | Performed by: OBSTETRICS & GYNECOLOGY

## 2019-11-12 NOTE — LETTER
November 12, 2019     Emi Wolf MD  [address]    Patient: Jim Howard   YOB: 1975   Date of Visit: 11/12/2019       Dear Dr Maicol Montalvo: Thank you for referring Jim Howard to me for evaluation  Below are my notes for this consultation  I sent a CBC for epistaxis  If you have questions, please do not hesitate to call me  I look forward to following your patient along with you  Sincerely,        Rosalio Zarco MD        CC: No Recipients  Rosalio Zarco MD  11/12/2019  1:55 PM  Sign at close encounter  Asked to speak with patient regarding questions on aspirin and epistaxis  Explained that I am OK with her continuing to do as she is doing now which is QOD aspirin; ordered CBC to assess platelets; discussed threshold of 70K required for regional   NSTs are reactive and YASSINE is normal for both fetuses    Rosalio Zarco MD

## 2019-11-12 NOTE — PROGRESS NOTES
Asked to speak with patient regarding questions on aspirin and epistaxis  Explained that I am OK with her continuing to do as she is doing now which is QOD aspirin; ordered CBC to assess platelets; discussed threshold of 70K required for regional   NSTs are reactive and YASSINE is normal for both fetuses    Landa Cabot, MD

## 2019-11-12 NOTE — PATIENT INSTRUCTIONS

## 2019-11-14 DIAGNOSIS — O21.0 HYPEREMESIS AFFECTING PREGNANCY, ANTEPARTUM: Primary | ICD-10-CM

## 2019-11-15 ENCOUNTER — ROUTINE PRENATAL (OUTPATIENT)
Dept: PERINATAL CARE | Facility: CLINIC | Age: 44
End: 2019-11-15
Payer: COMMERCIAL

## 2019-11-15 VITALS
WEIGHT: 153.8 LBS | HEART RATE: 76 BPM | HEIGHT: 62 IN | DIASTOLIC BLOOD PRESSURE: 67 MMHG | SYSTOLIC BLOOD PRESSURE: 119 MMHG | BODY MASS INDEX: 28.3 KG/M2

## 2019-11-15 DIAGNOSIS — O30.043 DICHORIONIC DIAMNIOTIC TWIN PREGNANCY IN THIRD TRIMESTER: Primary | ICD-10-CM

## 2019-11-15 DIAGNOSIS — O09.513 AMA (ADVANCED MATERNAL AGE) PRIMIGRAVIDA 35+, THIRD TRIMESTER: ICD-10-CM

## 2019-11-15 PROCEDURE — 59025 FETAL NON-STRESS TEST: CPT | Performed by: OBSTETRICS & GYNECOLOGY

## 2019-11-15 RX ORDER — ONDANSETRON HYDROCHLORIDE 8 MG/1
8 TABLET, FILM COATED ORAL EVERY 8 HOURS PRN
Qty: 30 TABLET | Refills: 0 | Status: SHIPPED | OUTPATIENT
Start: 2019-11-15

## 2019-11-15 NOTE — PROGRESS NOTES
[address]: Ms Michael Richards was seen today at 34w0d for NST (found under the pregnancy episode) which I reviewed the RN assessment and agree  Please don't hesitate to contact our office with any concerns or questions    Zakia Gaytan MD

## 2019-11-19 ENCOUNTER — APPOINTMENT (OUTPATIENT)
Dept: PERINATAL CARE | Facility: CLINIC | Age: 44
End: 2019-11-19
Payer: COMMERCIAL

## 2019-11-19 ENCOUNTER — ULTRASOUND (OUTPATIENT)
Dept: PERINATAL CARE | Facility: CLINIC | Age: 44
End: 2019-11-19
Payer: COMMERCIAL

## 2019-11-19 VITALS
HEIGHT: 62 IN | HEART RATE: 90 BPM | SYSTOLIC BLOOD PRESSURE: 122 MMHG | BODY MASS INDEX: 28.97 KG/M2 | DIASTOLIC BLOOD PRESSURE: 82 MMHG | WEIGHT: 157.4 LBS

## 2019-11-19 DIAGNOSIS — O09.513 AMA (ADVANCED MATERNAL AGE) PRIMIGRAVIDA 35+, THIRD TRIMESTER: ICD-10-CM

## 2019-11-19 DIAGNOSIS — O30.043 DICHORIONIC DIAMNIOTIC TWIN PREGNANCY IN THIRD TRIMESTER: Primary | ICD-10-CM

## 2019-11-19 DIAGNOSIS — Z3A.32 32 WEEKS GESTATION OF PREGNANCY: ICD-10-CM

## 2019-11-19 DIAGNOSIS — O30.043 DICHORIONIC DIAMNIOTIC TWIN PREGNANCY IN THIRD TRIMESTER: ICD-10-CM

## 2019-11-19 DIAGNOSIS — Z3A.34 34 WEEKS GESTATION OF PREGNANCY: ICD-10-CM

## 2019-11-19 DIAGNOSIS — O30.009 TWIN PREGNANCY RESULTING FROM ASSISTED REPRODUCTIVE TECHNOLOGY (ART): ICD-10-CM

## 2019-11-19 DIAGNOSIS — O09.819 TWIN PREGNANCY RESULTING FROM ASSISTED REPRODUCTIVE TECHNOLOGY (ART): ICD-10-CM

## 2019-11-19 LAB
BASOPHILS # BLD AUTO: 48 CELLS/UL (ref 0–200)
BASOPHILS NFR BLD AUTO: 0.9 %
EOSINOPHIL # BLD AUTO: 122 CELLS/UL (ref 15–500)
EOSINOPHIL NFR BLD AUTO: 2.3 %
ERYTHROCYTE [DISTWIDTH] IN BLOOD BY AUTOMATED COUNT: 12.9 % (ref 11–15)
HCT VFR BLD AUTO: 37.1 % (ref 35–45)
HGB BLD-MCNC: 12.3 G/DL (ref 11.7–15.5)
LYMPHOCYTES # BLD AUTO: 1076 CELLS/UL (ref 850–3900)
LYMPHOCYTES NFR BLD AUTO: 20.3 %
MCH RBC QN AUTO: 30.4 PG (ref 27–33)
MCHC RBC AUTO-ENTMCNC: 33.2 G/DL (ref 32–36)
MCV RBC AUTO: 91.6 FL (ref 80–100)
MONOCYTES # BLD AUTO: 329 CELLS/UL (ref 200–950)
MONOCYTES NFR BLD AUTO: 6.2 %
NEUTROPHILS # BLD AUTO: 3726 CELLS/UL (ref 1500–7800)
NEUTROPHILS NFR BLD AUTO: 70.3 %
PLATELET # BLD AUTO: 163 THOUSAND/UL (ref 140–400)
PMV BLD REES-ECKER: 11.8 FL (ref 7.5–12.5)
RBC # BLD AUTO: 4.05 MILLION/UL (ref 3.8–5.1)
WBC # BLD AUTO: 5.3 THOUSAND/UL (ref 3.8–10.8)

## 2019-11-19 PROCEDURE — 59025 FETAL NON-STRESS TEST: CPT | Performed by: OBSTETRICS & GYNECOLOGY

## 2019-11-19 PROCEDURE — 76816 OB US FOLLOW-UP PER FETUS: CPT | Performed by: OBSTETRICS & GYNECOLOGY

## 2019-11-19 NOTE — PROGRESS NOTES
Please refer to the [facility] ultrasound report in Ob Procedures for additional information regarding the visit to the [facility]  today    Jaya Delgado MD

## 2019-11-19 NOTE — LETTER
November 21, 2019     Kerry Da Silva MD  [address]    Patient: Maxine Martinez   YOB: 1975   Date of Visit: 11/19/2019       Dear Dr Nico Keys: Thank you for referring Maxine Martinez to me for evaluation  Below are my notes for this consultation  If you have questions, please do not hesitate to call me  I look forward to following your patient along with you  Sincerely,        Adalgisa Sharma MD        CC: No Recipients  Adalgisa Sharma MD  11/19/2019  2:10 PM  Sign at close encounter    Please refer to the [facility] ultrasound report in Ob Procedures for additional information regarding the visit to the [facility]  today    Adalgisa Sharma MD

## 2019-11-19 NOTE — PATIENT INSTRUCTIONS

## 2019-11-20 ENCOUNTER — TELEPHONE (OUTPATIENT)
Dept: PERINATAL CARE | Facility: CLINIC | Age: 44
End: 2019-11-20

## 2019-11-20 NOTE — TELEPHONE ENCOUNTER
Spoke with pt and provided pt with results of CBC  PT receptive and declines questions at this time

## 2019-11-20 NOTE — TELEPHONE ENCOUNTER
----- Message from Catalina Aranda MD sent at 11/20/2019  7:53 AM EST -----  [facility] RN staff, I've reviewed this CBC (platelet count, done for epistaxis) result which is normal, can you call her regarding this result? Thank you    Catalina Aranda MD

## 2019-11-22 ENCOUNTER — ROUTINE PRENATAL (OUTPATIENT)
Dept: PERINATAL CARE | Facility: CLINIC | Age: 44
End: 2019-11-22
Payer: COMMERCIAL

## 2019-11-22 ENCOUNTER — HOSPITAL ENCOUNTER (OUTPATIENT)
Facility: HOSPITAL | Age: 44
Discharge: HOME | End: 2019-11-22
Attending: OBSTETRICS & GYNECOLOGY | Admitting: OBSTETRICS & GYNECOLOGY
Payer: COMMERCIAL

## 2019-11-22 VITALS
HEART RATE: 77 BPM | DIASTOLIC BLOOD PRESSURE: 91 MMHG | SYSTOLIC BLOOD PRESSURE: 136 MMHG | TEMPERATURE: 98.2 F | RESPIRATION RATE: 18 BRPM

## 2019-11-22 VITALS
WEIGHT: 160 LBS | HEIGHT: 62 IN | HEART RATE: 88 BPM | DIASTOLIC BLOOD PRESSURE: 84 MMHG | BODY MASS INDEX: 29.44 KG/M2 | SYSTOLIC BLOOD PRESSURE: 127 MMHG

## 2019-11-22 DIAGNOSIS — O09.513 AMA (ADVANCED MATERNAL AGE) PRIMIGRAVIDA 35+, THIRD TRIMESTER: ICD-10-CM

## 2019-11-22 DIAGNOSIS — O30.043 DICHORIONIC DIAMNIOTIC TWIN PREGNANCY IN THIRD TRIMESTER: ICD-10-CM

## 2019-11-22 DIAGNOSIS — Z3A.35 35 WEEKS GESTATION OF PREGNANCY: Primary | ICD-10-CM

## 2019-11-22 PROCEDURE — 59025 FETAL NON-STRESS TEST: CPT | Performed by: OBSTETRICS & GYNECOLOGY

## 2019-11-22 PROCEDURE — NC001 PR NO CHARGE: Performed by: OBSTETRICS & GYNECOLOGY

## 2019-11-22 PROCEDURE — 99213 OFFICE O/P EST LOW 20 MIN: CPT

## 2019-11-22 NOTE — PROGRESS NOTES
L&D Triage Note - OB/GYN  Benay Colder 38 y o  female MRN: [id number]  Unit/Bed#:  Triage  Encounter: [id number]      Assessment:  38 y o   at 29w0d with di-di twin gestation who presents not in  labor, with no cervical change on exam, reactive FHT of both twins  Plan:  1  Stable for discharge home with return precautions and continued close follow up with OB    _________________________________      Chief Compliant: Contractions since last night    Subjective:  38 y o   at 35w0d who presents complaining of contractions that have been somewhat regular every 15-20 minutes  The contractions have not been very painful, feel like a bit of cramping pain  She was able to sleep overnight  She is still feeling good fetal movement, no vaginal bleeding, and no leakage of fluid  She denies any other symptoms      Pregnancy complications:  Di-di twin gestation, concordant growth  Hypothyroidism  IVF pregnancy      Objective:  Vitals:    19 1232   BP: 136/91   Pulse: 77   Resp:    Temp:        SVE: 0 5/ 60% / -2  SSE: No lesions, no bleeding, cervix appears visually closed  FHT A:  140 / Moderate 6 - 25 bpm / Accelerations present, no decelerations  FHT B: 135/ Moderate 6-25 bpm / Accelerations present, no decelerations  [place]: irregular, q5-8 minutes, mild    Wet mount/KOH: No clue cells, no motile organisms, no hyphae  Rupture workup: Nitrazine negative, no Ferning, no Pooling    Discussed with Dr Aishwarya Valiente MD  2019 1:08 PM

## 2019-11-22 NOTE — DISCHARGE INSTRUCTIONS
Pregnancy at 28 to 38 Weeks   AMBULATORY CARE:   What changes are happening to your body: You are considered full term at the beginning of 37 weeks  Your breathing may be easier if your baby has moved down into a head-down position  You may need to urinate more often because the baby may be pressing on your bladder  You may also feel more discomfort and get tired easily  Seek care immediately if:   · You develop a severe headache that does not go away  · You have new or increased vision changes, such as blurred or spotted vision  · You have new or increased swelling in your face or hands  · You have vaginal spotting or bleeding  · Your water broke or you feel warm water gushing or trickling from your vagina  Contact your healthcare provider if:   · You have more than 5 contractions in 1 hour  · You notice any changes in your baby's movements  · You have abdominal cramps, pressure, or tightening  · You have a change in vaginal discharge  · You have chills or a fever  · You have vaginal itching, burning, or pain  · You have yellow, green, white, or foul-smelling vaginal discharge  · You have pain or burning when you urinate, less urine than usual, or pink or bloody urine  · You have questions or concerns about your condition or care  How to care for yourself at this stage of your pregnancy:   · Eat a variety of healthy foods  Healthy foods include fruits, vegetables, whole-grain breads, low-fat dairy foods, beans, lean meats, and fish  Drink liquids as directed  Ask how much liquid to drink each day and which liquids are best for you  Limit caffeine to less than 200 milligrams each day  Limit your intake of fish to 2 servings each week  Choose fish low in mercury such as canned light tuna, shrimp, crab, salmon, cod, or tilapia  Do not  eat fish high in mercury such as swordfish, tilefish, khadijah mackerel, and shark  · Take prenatal vitamins as directed    Your need for certain vitamins and minerals, such as folic acid, increases during pregnancy  Prenatal vitamins provide some of the extra vitamins and minerals you need  Prenatal vitamins may also help to decrease the risk of certain birth defects  · Rest as needed  Put your feet up if you have swelling in your ankles and feet  · Do not smoke  If you smoke, it is never too late to quit  Smoking increases your risk of a miscarriage and other health problems during your pregnancy  Smoking can cause your baby to be born too early or weigh less at birth  Ask your healthcare provider for information if you need help quitting  · Do not drink alcohol  Alcohol passes from your body to your baby through the placenta  It can affect your baby's brain development and cause fetal alcohol syndrome (FAS)  FAS is a group of conditions that causes mental, behavior, and growth problems  · Talk to your healthcare provider before you take any medicines  Many medicines may harm your baby if you take them when you are pregnant  Do not take any medicines, vitamins, herbs, or supplements without first talking to your healthcare provider  Never use illegal or street drugs (such as marijuana or cocaine) while you are pregnant  · Talk to your healthcare provider before you travel  You may not be able to travel in an airplane after 36 weeks  He may also recommend that you avoid long road trips  Safety tips during pregnancy:   · Avoid hot tubs and saunas  Do not use a hot tub or sauna while you are pregnant, especially during your first trimester  Hot tubs and saunas may raise your baby's temperature and increase the risk of birth defects  · Avoid toxoplasmosis  This is an infection caused by eating raw meat or being around infected cat feces  It can cause birth defects, miscarriages, and other problems  Wash your hands after you touch raw meat  Make sure any meat is well-cooked before you eat it   Avoid raw eggs and unpasteurized milk  Use gloves or ask someone else to clean your cat's litter box while you are pregnant  · Ask your healthcare provider about travel  The most comfortable time to travel is during the second trimester  Ask your healthcare provider if you can travel after 36 weeks  You may not be able to travel in an airplane after 36 weeks  He may also recommend that you avoid long road trips  Changes that are happening with your baby:  By 38 weeks, your baby may weigh between 6 and 9 pounds  Your baby may be about 14 inches long from the top of the head to the rump (baby's bottom)  Your baby hears well enough to know your voice  As your baby gets larger, you may feel fewer kicks and more stretching and rolling  Your baby may move into a head-down position  Your baby will also rest lower in your abdomen  What you need to know about prenatal care: Your healthcare provider will check your blood pressure and weight  You may also need the following:  · A urine test  may also be done to check for sugar and protein  These can be signs of gestational diabetes or infection  Protein in your urine may also be a sign of preeclampsia  Preeclampsia is a condition that can develop during week 20 or later of your pregnancy  It causes high blood pressure, and it can cause problems with your kidneys and other organs  · A blood test  may be done to check for anemia (low iron level)  · A Tdap vaccine  may be recommended by your healthcare provider  · A group B strep test  is a test that is done to check for group B strep infection  Group B strep is a type of bacteria that may be found in the vagina or rectum  It can be passed to your baby during delivery if you have it  Your healthcare provider will take swab your vagina or rectum and send the sample to the lab for tests  · Fundal height  is a measurement of your uterus to check your baby's growth   This number is usually the same as the number of weeks that you have been pregnant  Your healthcare provider may also check your baby's position  · Your baby's heart rate  will be checked  © 2017 [employer] Information is for End User's use only and may not be sold, redistributed or otherwise used for commercial purposes  All illustrations and images included in CareNotes® are the copyrighted property of A D [employer] M [employer]  or [employer]  The above information is an  only  It is not intended as medical advice for individual conditions or treatments  Talk to your doctor, nurse or pharmacist before following any medical regimen to see if it is safe and effective for you

## 2019-11-26 ENCOUNTER — ULTRASOUND (OUTPATIENT)
Dept: PERINATAL CARE | Facility: CLINIC | Age: 44
End: 2019-11-26
Payer: COMMERCIAL

## 2019-11-26 ENCOUNTER — APPOINTMENT (OUTPATIENT)
Dept: LAB | Facility: HOSPITAL | Age: 44
End: 2019-11-26
Attending: OBSTETRICS & GYNECOLOGY
Payer: COMMERCIAL

## 2019-11-26 VITALS
HEART RATE: 107 BPM | WEIGHT: 163.4 LBS | DIASTOLIC BLOOD PRESSURE: 79 MMHG | SYSTOLIC BLOOD PRESSURE: 138 MMHG | BODY MASS INDEX: 30.07 KG/M2 | HEIGHT: 62 IN

## 2019-11-26 DIAGNOSIS — O13.3 GESTATIONAL HYPERTENSION, THIRD TRIMESTER: ICD-10-CM

## 2019-11-26 DIAGNOSIS — O30.043 DICHORIONIC DIAMNIOTIC TWIN PREGNANCY IN THIRD TRIMESTER: Primary | ICD-10-CM

## 2019-11-26 DIAGNOSIS — R60.0 PEDAL EDEMA: ICD-10-CM

## 2019-11-26 DIAGNOSIS — O09.513 AMA (ADVANCED MATERNAL AGE) PRIMIGRAVIDA 35+, THIRD TRIMESTER: ICD-10-CM

## 2019-11-26 DIAGNOSIS — Z3A.35 35 WEEKS GESTATION OF PREGNANCY: ICD-10-CM

## 2019-11-26 DIAGNOSIS — Z36.9 UNSPECIFIED ANTENATAL SCREENING: Primary | ICD-10-CM

## 2019-11-26 LAB
25(OH)D3 SERPL-MCNC: 30.6 NG/ML (ref 30–100)
ALBUMIN SERPL BCP-MCNC: 2.2 G/DL (ref 3.5–5)
ALP SERPL-CCNC: 881 U/L (ref 46–116)
ALT SERPL W P-5'-P-CCNC: 15 U/L (ref 12–78)
ANION GAP SERPL CALCULATED.3IONS-SCNC: 7 MMOL/L (ref 4–13)
AST SERPL W P-5'-P-CCNC: 27 U/L (ref 5–45)
BILIRUB SERPL-MCNC: 0.26 MG/DL (ref 0.2–1)
BUN SERPL-MCNC: 14 MG/DL (ref 5–25)
CALCIUM SERPL-MCNC: 9.1 MG/DL (ref 8.3–10.1)
CHLORIDE SERPL-SCNC: 109 MMOL/L (ref 100–108)
CO2 SERPL-SCNC: 21 MMOL/L (ref 21–32)
CREAT SERPL-MCNC: 0.71 MG/DL (ref 0.6–1.3)
CREAT UR-MCNC: 25.4 MG/DL
ERYTHROCYTE [DISTWIDTH] IN BLOOD BY AUTOMATED COUNT: 13.6 % (ref 11.6–15.1)
GFR SERPL CREATININE-BSD FRML MDRD: 103 ML/MIN/1.73SQ M
GLUCOSE SERPL-MCNC: 70 MG/DL (ref 65–140)
HCT VFR BLD AUTO: 38.4 % (ref 34.8–46.1)
HGB BLD-MCNC: 12.7 G/DL (ref 11.5–15.4)
LDH SERPL-CCNC: 214 U/L (ref 81–234)
MCH RBC QN AUTO: 30.5 PG (ref 26.8–34.3)
MCHC RBC AUTO-ENTMCNC: 33.1 G/DL (ref 31.4–37.4)
MCV RBC AUTO: 92 FL (ref 82–98)
PLATELET # BLD AUTO: 171 THOUSANDS/UL (ref 149–390)
PMV BLD AUTO: 12 FL (ref 8.9–12.7)
POTASSIUM SERPL-SCNC: 4.8 MMOL/L (ref 3.5–5.3)
PROT SERPL-MCNC: 6 G/DL (ref 6.4–8.2)
PROT UR-MCNC: 53 MG/DL
PROT/CREAT UR: 2.09 MG/G{CREAT} (ref 0–0.1)
RBC # BLD AUTO: 4.16 MILLION/UL (ref 3.81–5.12)
SODIUM SERPL-SCNC: 137 MMOL/L (ref 136–145)
URATE SERPL-MCNC: 5.1 MG/DL (ref 2–6.8)
WBC # BLD AUTO: 5.79 THOUSAND/UL (ref 4.31–10.16)

## 2019-11-26 PROCEDURE — 83615 LACTATE (LD) (LDH) ENZYME: CPT | Performed by: OBSTETRICS & GYNECOLOGY

## 2019-11-26 PROCEDURE — 82570 ASSAY OF URINE CREATININE: CPT | Performed by: OBSTETRICS & GYNECOLOGY

## 2019-11-26 PROCEDURE — 84156 ASSAY OF PROTEIN URINE: CPT | Performed by: OBSTETRICS & GYNECOLOGY

## 2019-11-26 PROCEDURE — 76816 OB US FOLLOW-UP PER FETUS: CPT | Performed by: OBSTETRICS & GYNECOLOGY

## 2019-11-26 PROCEDURE — 82306 VITAMIN D 25 HYDROXY: CPT

## 2019-11-26 PROCEDURE — 85027 COMPLETE CBC AUTOMATED: CPT | Performed by: OBSTETRICS & GYNECOLOGY

## 2019-11-26 PROCEDURE — 36415 COLL VENOUS BLD VENIPUNCTURE: CPT | Performed by: OBSTETRICS & GYNECOLOGY

## 2019-11-26 PROCEDURE — 80053 COMPREHEN METABOLIC PANEL: CPT | Performed by: OBSTETRICS & GYNECOLOGY

## 2019-11-26 PROCEDURE — 99213 OFFICE O/P EST LOW 20 MIN: CPT | Performed by: OBSTETRICS & GYNECOLOGY

## 2019-11-26 PROCEDURE — 84550 ASSAY OF BLOOD/URIC ACID: CPT | Performed by: OBSTETRICS & GYNECOLOGY

## 2019-11-26 PROCEDURE — 59025 FETAL NON-STRESS TEST: CPT | Performed by: OBSTETRICS & GYNECOLOGY

## 2019-11-26 NOTE — Clinical Note
Seen in M for testing  New onset pedal edema bilaterally  Seeing her ob in am  Nml bp  At high risk for preeclampsia based on age and twins and IF pregnancy   Will get baseline preclamptic labs that can be reviewed by her ob in the am

## 2019-11-26 NOTE — PROGRESS NOTES
NST done for advanced maternal age and dichorionic diamniotic twin pregnancy  Patient reports significant worsening of pedal edema bilaterally that is pitting and extends to the knee  She has no evidence of clonus  She has no evidence of right upper quadrant pain  She is able to lie flat  She has no edema of her face and hands  She denies shortness of breath or chest pain or unresolving headache or visual changes  Risk factors for her to develop preeclampsia include her age of 38, IVF pregnancy and dichorionic twins  She had a blood pressure of 136/91 on a triage visit on 11/22  She has no evidence of anemia on a recent CBC on 11/19  With her advanced maternal age and now 2 elevated blood pressures (1 in triage of 136/91 and 1 in my office of 136/92) I believe she is developing gestational hypertension  I have ordered baseline preeclamptic labs  She is seeing her OB provider tomorrow to review these results  We reviewed the signs and symptoms of preeclampsia and when she should contact her OB provider  Will continue her twice weekly NST and weekly YASSINE's till delivery  Recommend planning delivery any time after 37 weeks for the indication of gestational hypertension  The majority of time (greater then 50%) was spent on counseling and coordination of care of this patient and /or family member  Approximate face to face time was 15 minutes          Emily Tomlinson MD

## 2019-11-26 NOTE — PATIENT INSTRUCTIONS

## 2019-11-26 NOTE — LETTER
11/26/19  Kevin Caldwellm  1975    Thank you for completing Part 1 of your Sequential Screen  To obtain a complete test result, please complete blood work for Part 2 Sequential Screen between the weeks of ______ to ______  Based on your insurance coverage, please use one of the following locations  Call our office for any questions at [phone]      [address] [place] [address] Avenue  1492 [facility], [place], [facility], [address]    300 [facility], [place], [facility] [address]       Phone: [phone]      Phone: [phone]    [address] 6 [facility], [address] [place] [address]  Phone: [phone]      Phone: [phone] (*ask for lab)    [address], [facility], [address] [address] [address]  Phone: [phone]      Phone:  [phone]  Hours: Monday-Friday 6a-6p, Saturday 7a-12    [address]  1401 [facility] 6   [address]  Phone: [phone]      Phone:  793 [facility],[address]  207 [facility], [facility], [address] [facility] [address]   [address]  Phone: [phone]      Phone: [phone]      Sincerely,    Michelle Tejeda RN

## 2019-11-27 ENCOUNTER — TELEPHONE (OUTPATIENT)
Dept: OBGYN CLINIC | Facility: CLINIC | Age: 44
End: 2019-11-27

## 2019-11-27 ENCOUNTER — TELEPHONE (OUTPATIENT)
Dept: PERINATAL CARE | Facility: CLINIC | Age: 44
End: 2019-11-27

## 2019-11-27 ENCOUNTER — HOSPITAL ENCOUNTER (INPATIENT)
Facility: HOSPITAL | Age: 44
LOS: 5 days | Discharge: HOME | End: 2019-12-03
Attending: OBSTETRICS & GYNECOLOGY | Admitting: OBSTETRICS & GYNECOLOGY
Payer: COMMERCIAL

## 2019-11-27 ENCOUNTER — DOCUMENTATION (OUTPATIENT)
Dept: OBGYN CLINIC | Facility: CLINIC | Age: 44
End: 2019-11-27

## 2019-11-27 DIAGNOSIS — O30.009 TWIN DELIVERY BY C-SECTION: ICD-10-CM

## 2019-11-27 DIAGNOSIS — O14.90 PREECLAMPSIA: ICD-10-CM

## 2019-11-27 DIAGNOSIS — O30.043 DICHORIONIC DIAMNIOTIC TWIN PREGNANCY IN THIRD TRIMESTER: Primary | ICD-10-CM

## 2019-11-27 PROCEDURE — 4A1HXCZ MONITORING OF PRODUCTS OF CONCEPTION, CARDIAC RATE, EXTERNAL APPROACH: ICD-10-PCS | Performed by: OBSTETRICS & GYNECOLOGY

## 2019-11-27 NOTE — TELEPHONE ENCOUNTER
T/c from patient was under the impression she was scheduled to see Dr Loy Lovell , advised she would be seeing [facility], wanted me to reach out to Dr Loy Lovell that and see what we can do about her seeing a doctor be for delivery , spoke with Dr Nicko Marie and Loy Lovell patient will be seen Friday am in [place] office for her OB visit    AURORA CHAMORRO

## 2019-11-28 ENCOUNTER — ANESTHESIA EVENT (INPATIENT)
Dept: LABOR AND DELIVERY | Facility: HOSPITAL | Age: 44
End: 2019-11-28
Payer: COMMERCIAL

## 2019-11-28 ENCOUNTER — ANESTHESIA (INPATIENT)
Dept: LABOR AND DELIVERY | Facility: HOSPITAL | Age: 44
End: 2019-11-28
Payer: COMMERCIAL

## 2019-11-28 LAB
ABO GROUP BLD: NORMAL
ALBUMIN SERPL BCP-MCNC: 1.6 G/DL (ref 3.5–5)
ALBUMIN SERPL BCP-MCNC: 2.4 G/DL (ref 3.5–5)
ALP SERPL-CCNC: 693 U/L (ref 46–116)
ALP SERPL-CCNC: 958 U/L (ref 46–116)
ALT SERPL W P-5'-P-CCNC: 14 U/L (ref 12–78)
ALT SERPL W P-5'-P-CCNC: 16 U/L (ref 12–78)
ANION GAP SERPL CALCULATED.3IONS-SCNC: 7 MMOL/L (ref 4–13)
ANION GAP SERPL CALCULATED.3IONS-SCNC: 9 MMOL/L (ref 4–13)
AST SERPL W P-5'-P-CCNC: 27 U/L (ref 5–45)
AST SERPL W P-5'-P-CCNC: 37 U/L (ref 5–45)
BASE EXCESS BLDCOA CALC-SCNC: -7.8 MMOL/L (ref 3–11)
BASE EXCESS BLDCOA CALC-SCNC: -8.7 MMOL/L (ref 3–11)
BASE EXCESS BLDCOV CALC-SCNC: -6.1 MMOL/L (ref 1–9)
BASE EXCESS BLDCOV CALC-SCNC: -7.6 MMOL/L (ref 1–9)
BASOPHILS # BLD AUTO: 0.02 THOUSANDS/ΜL (ref 0–0.1)
BASOPHILS # BLD AUTO: 0.03 THOUSANDS/ΜL (ref 0–0.1)
BASOPHILS # BLD AUTO: 0.07 THOUSANDS/ΜL (ref 0–0.1)
BASOPHILS NFR BLD AUTO: 0 % (ref 0–1)
BASOPHILS NFR BLD AUTO: 0 % (ref 0–1)
BASOPHILS NFR BLD AUTO: 1 % (ref 0–1)
BILIRUB SERPL-MCNC: 0.27 MG/DL (ref 0.2–1)
BILIRUB SERPL-MCNC: 0.31 MG/DL (ref 0.2–1)
BLD GP AB SCN SERPL QL: NEGATIVE
BUN SERPL-MCNC: 15 MG/DL (ref 5–25)
BUN SERPL-MCNC: 16 MG/DL (ref 5–25)
CALCIUM SERPL-MCNC: 8.4 MG/DL (ref 8.3–10.1)
CALCIUM SERPL-MCNC: 9.5 MG/DL (ref 8.3–10.1)
CHLORIDE SERPL-SCNC: 106 MMOL/L (ref 100–108)
CHLORIDE SERPL-SCNC: 108 MMOL/L (ref 100–108)
CO2 SERPL-SCNC: 19 MMOL/L (ref 21–32)
CO2 SERPL-SCNC: 22 MMOL/L (ref 21–32)
CREAT SERPL-MCNC: 0.72 MG/DL (ref 0.6–1.3)
CREAT SERPL-MCNC: 0.87 MG/DL (ref 0.6–1.3)
EOSINOPHIL # BLD AUTO: 0 THOUSAND/ΜL (ref 0–0.61)
EOSINOPHIL # BLD AUTO: 0 THOUSAND/ΜL (ref 0–0.61)
EOSINOPHIL # BLD AUTO: 0.07 THOUSAND/ΜL (ref 0–0.61)
EOSINOPHIL NFR BLD AUTO: 0 % (ref 0–6)
EOSINOPHIL NFR BLD AUTO: 0 % (ref 0–6)
EOSINOPHIL NFR BLD AUTO: 1 % (ref 0–6)
ERYTHROCYTE [DISTWIDTH] IN BLOOD BY AUTOMATED COUNT: 13.4 % (ref 11.6–15.1)
ERYTHROCYTE [DISTWIDTH] IN BLOOD BY AUTOMATED COUNT: 13.5 % (ref 11.6–15.1)
ERYTHROCYTE [DISTWIDTH] IN BLOOD BY AUTOMATED COUNT: 13.7 % (ref 11.6–15.1)
EXTERNAL GROUP B STREP ANTIGEN: NORMAL
GFR SERPL CREATININE-BSD FRML MDRD: 101 ML/MIN/1.73SQ M
GFR SERPL CREATININE-BSD FRML MDRD: 81 ML/MIN/1.73SQ M
GLUCOSE SERPL-MCNC: 103 MG/DL (ref 65–140)
GLUCOSE SERPL-MCNC: 74 MG/DL (ref 65–140)
HCO3 BLDCOA-SCNC: 19.2 MMOL/L (ref 17.3–27.3)
HCO3 BLDCOA-SCNC: 21.1 MMOL/L (ref 17.3–27.3)
HCO3 BLDCOV-SCNC: 19.9 MMOL/L (ref 12.2–28.6)
HCO3 BLDCOV-SCNC: 21.1 MMOL/L (ref 12.2–28.6)
HCT VFR BLD AUTO: 28.5 % (ref 34.8–46.1)
HCT VFR BLD AUTO: 31.9 % (ref 34.8–46.1)
HCT VFR BLD AUTO: 40.2 % (ref 34.8–46.1)
HGB BLD-MCNC: 10.6 G/DL (ref 11.5–15.4)
HGB BLD-MCNC: 13.6 G/DL (ref 11.5–15.4)
HGB BLD-MCNC: 9.5 G/DL (ref 11.5–15.4)
HIV 1+2 AB+HIV1 P24 AG SERPL QL IA: NORMAL
HIV1 P24 AG SER QL: NORMAL
IMM GRANULOCYTES # BLD AUTO: 0.09 THOUSAND/UL (ref 0–0.2)
IMM GRANULOCYTES # BLD AUTO: 0.09 THOUSAND/UL (ref 0–0.2)
IMM GRANULOCYTES # BLD AUTO: 0.17 THOUSAND/UL (ref 0–0.2)
IMM GRANULOCYTES NFR BLD AUTO: 1 % (ref 0–2)
LYMPHOCYTES # BLD AUTO: 0.46 THOUSANDS/ΜL (ref 0.6–4.47)
LYMPHOCYTES # BLD AUTO: 0.83 THOUSANDS/ΜL (ref 0.6–4.47)
LYMPHOCYTES # BLD AUTO: 1.53 THOUSANDS/ΜL (ref 0.6–4.47)
LYMPHOCYTES NFR BLD AUTO: 17 % (ref 14–44)
LYMPHOCYTES NFR BLD AUTO: 3 % (ref 14–44)
LYMPHOCYTES NFR BLD AUTO: 5 % (ref 14–44)
MAGNESIUM SERPL-MCNC: 8.2 MG/DL (ref 1.6–2.6)
MCH RBC QN AUTO: 30.6 PG (ref 26.8–34.3)
MCH RBC QN AUTO: 30.8 PG (ref 26.8–34.3)
MCH RBC QN AUTO: 30.8 PG (ref 26.8–34.3)
MCHC RBC AUTO-ENTMCNC: 33.2 G/DL (ref 31.4–37.4)
MCHC RBC AUTO-ENTMCNC: 33.3 G/DL (ref 31.4–37.4)
MCHC RBC AUTO-ENTMCNC: 33.8 G/DL (ref 31.4–37.4)
MCV RBC AUTO: 91 FL (ref 82–98)
MCV RBC AUTO: 92 FL (ref 82–98)
MCV RBC AUTO: 93 FL (ref 82–98)
MONOCYTES # BLD AUTO: 0.52 THOUSAND/ΜL (ref 0.17–1.22)
MONOCYTES # BLD AUTO: 0.58 THOUSAND/ΜL (ref 0.17–1.22)
MONOCYTES # BLD AUTO: 0.66 THOUSAND/ΜL (ref 0.17–1.22)
MONOCYTES NFR BLD AUTO: 3 % (ref 4–12)
MONOCYTES NFR BLD AUTO: 4 % (ref 4–12)
MONOCYTES NFR BLD AUTO: 8 % (ref 4–12)
NEUTROPHILS # BLD AUTO: 14.69 THOUSANDS/ΜL (ref 1.85–7.62)
NEUTROPHILS # BLD AUTO: 14.99 THOUSANDS/ΜL (ref 1.85–7.62)
NEUTROPHILS # BLD AUTO: 6.41 THOUSANDS/ΜL (ref 1.85–7.62)
NEUTS SEG NFR BLD AUTO: 72 % (ref 43–75)
NEUTS SEG NFR BLD AUTO: 90 % (ref 43–75)
NEUTS SEG NFR BLD AUTO: 93 % (ref 43–75)
NRBC BLD AUTO-RTO: 0 /100 WBCS
O2 CT VFR BLDCOA CALC: 11.2 ML/DL
O2 CT VFR BLDCOA CALC: 2 ML/DL
OXYHGB MFR BLDCOA: 50.5 %
OXYHGB MFR BLDCOA: 9.5 %
OXYHGB MFR BLDCOV: 20.2 %
OXYHGB MFR BLDCOV: 60.3 %
PCO2 BLDCOA: 44 MM[HG] (ref 30–60)
PCO2 BLDCOA: 62.4 MM[HG] (ref 30–60)
PCO2 BLDCOV: 41.2 MM HG (ref 27–43)
PCO2 BLDCOV: 55 MM HG (ref 27–43)
PH BLDCOA: 7.15 [PH] (ref 7.23–7.43)
PH BLDCOA: 7.26 [PH] (ref 7.23–7.43)
PH BLDCOV: 7.2 [PH] (ref 7.19–7.49)
PH BLDCOV: 7.3 [PH] (ref 7.19–7.49)
PLATELET # BLD AUTO: 163 THOUSANDS/UL (ref 149–390)
PLATELET # BLD AUTO: 168 THOUSANDS/UL (ref 149–390)
PLATELET # BLD AUTO: 186 THOUSANDS/UL (ref 149–390)
PMV BLD AUTO: 11.7 FL (ref 8.9–12.7)
PMV BLD AUTO: 11.8 FL (ref 8.9–12.7)
PMV BLD AUTO: 11.9 FL (ref 8.9–12.7)
PO2 BLDCOA: 10 MM HG (ref 5–25)
PO2 BLDCOA: 25.6 MM HG (ref 5–25)
PO2 BLDCOV: 14.1 MM HG (ref 15–45)
PO2 BLDCOV: 27.4 MM HG (ref 15–45)
POTASSIUM SERPL-SCNC: 4.6 MMOL/L (ref 3.5–5.3)
POTASSIUM SERPL-SCNC: 4.7 MMOL/L (ref 3.5–5.3)
PROT SERPL-MCNC: 4.8 G/DL (ref 6.4–8.2)
PROT SERPL-MCNC: 6.4 G/DL (ref 6.4–8.2)
RBC # BLD AUTO: 3.1 MILLION/UL (ref 3.81–5.12)
RBC # BLD AUTO: 3.44 MILLION/UL (ref 3.81–5.12)
RBC # BLD AUTO: 4.42 MILLION/UL (ref 3.81–5.12)
RH BLD: POSITIVE
SAO2 % BLDCOV: 13.7 ML/DL
SAO2 % BLDCOV: 4.5 ML/DL
SODIUM SERPL-SCNC: 135 MMOL/L (ref 136–145)
SODIUM SERPL-SCNC: 136 MMOL/L (ref 136–145)
SPECIMEN EXPIRATION DATE: NORMAL
T4 FREE SERPL-MCNC: 0.78 NG/DL (ref 0.76–1.46)
TSH SERPL DL<=0.05 MIU/L-ACNC: 9.61 UIU/ML (ref 0.36–3.74)
WBC # BLD AUTO: 16.09 THOUSAND/UL (ref 4.31–10.16)
WBC # BLD AUTO: 16.29 THOUSAND/UL (ref 4.31–10.16)
WBC # BLD AUTO: 8.83 THOUSAND/UL (ref 4.31–10.16)

## 2019-11-28 PROCEDURE — 86900 BLOOD TYPING SEROLOGIC ABO: CPT | Performed by: OBSTETRICS & GYNECOLOGY

## 2019-11-28 PROCEDURE — 80053 COMPREHEN METABOLIC PANEL: CPT | Performed by: OBSTETRICS & GYNECOLOGY

## 2019-11-28 PROCEDURE — 87806 HIV AG W/HIV1&2 ANTB W/OPTIC: CPT | Performed by: OBSTETRICS & GYNECOLOGY

## 2019-11-28 PROCEDURE — 84443 ASSAY THYROID STIM HORMONE: CPT | Performed by: OBSTETRICS & GYNECOLOGY

## 2019-11-28 PROCEDURE — 88307 TISSUE EXAM BY PATHOLOGIST: CPT | Performed by: PATHOLOGY

## 2019-11-28 PROCEDURE — 99024 POSTOP FOLLOW-UP VISIT: CPT | Performed by: OBSTETRICS & GYNECOLOGY

## 2019-11-28 PROCEDURE — 58611 LIGATE OVIDUCT(S) ADD-ON: CPT | Performed by: OBSTETRICS & GYNECOLOGY

## 2019-11-28 PROCEDURE — 85025 COMPLETE CBC W/AUTO DIFF WBC: CPT | Performed by: OBSTETRICS & GYNECOLOGY

## 2019-11-28 PROCEDURE — 88305 TISSUE EXAM BY PATHOLOGIST: CPT | Performed by: PATHOLOGY

## 2019-11-28 PROCEDURE — 86901 BLOOD TYPING SEROLOGIC RH(D): CPT | Performed by: OBSTETRICS & GYNECOLOGY

## 2019-11-28 PROCEDURE — 86592 SYPHILIS TEST NON-TREP QUAL: CPT | Performed by: OBSTETRICS & GYNECOLOGY

## 2019-11-28 PROCEDURE — 84439 ASSAY OF FREE THYROXINE: CPT | Performed by: OBSTETRICS & GYNECOLOGY

## 2019-11-28 PROCEDURE — 82805 BLOOD GASES W/O2 SATURATION: CPT | Performed by: OBSTETRICS & GYNECOLOGY

## 2019-11-28 PROCEDURE — 86850 RBC ANTIBODY SCREEN: CPT | Performed by: OBSTETRICS & GYNECOLOGY

## 2019-11-28 PROCEDURE — 83735 ASSAY OF MAGNESIUM: CPT | Performed by: OBSTETRICS & GYNECOLOGY

## 2019-11-28 PROCEDURE — 59510 CESAREAN DELIVERY: CPT | Performed by: OBSTETRICS & GYNECOLOGY

## 2019-11-28 PROCEDURE — 0UB70ZZ EXCISION OF BILATERAL FALLOPIAN TUBES, OPEN APPROACH: ICD-10-PCS | Performed by: OBSTETRICS & GYNECOLOGY

## 2019-11-28 PROCEDURE — 99213 OFFICE O/P EST LOW 20 MIN: CPT

## 2019-11-28 RX ORDER — OXYCODONE HYDROCHLORIDE 10 MG/1
10 TABLET ORAL EVERY 4 HOURS PRN
Status: DISCONTINUED | OUTPATIENT
Start: 2019-11-29 | End: 2019-12-02

## 2019-11-28 RX ORDER — KETOROLAC TROMETHAMINE 30 MG/ML
INJECTION, SOLUTION INTRAMUSCULAR; INTRAVENOUS AS NEEDED
Status: DISCONTINUED | OUTPATIENT
Start: 2019-11-28 | End: 2019-11-28 | Stop reason: SURG

## 2019-11-28 RX ORDER — IBUPROFEN 600 MG/1
600 TABLET ORAL EVERY 6 HOURS SCHEDULED
Status: DISCONTINUED | OUTPATIENT
Start: 2019-11-29 | End: 2019-11-29

## 2019-11-28 RX ORDER — HYDROMORPHONE HCL/PF 1 MG/ML
1 SYRINGE (ML) INJECTION EVERY 2 HOUR PRN
Status: DISCONTINUED | OUTPATIENT
Start: 2019-11-29 | End: 2019-11-29

## 2019-11-28 RX ORDER — ONDANSETRON 2 MG/ML
4 INJECTION INTRAMUSCULAR; INTRAVENOUS EVERY 8 HOURS PRN
Status: DISCONTINUED | OUTPATIENT
Start: 2019-11-29 | End: 2019-12-03 | Stop reason: HOSPADM

## 2019-11-28 RX ORDER — DIPHENHYDRAMINE HCL 25 MG
25 TABLET ORAL EVERY 6 HOURS PRN
Status: DISCONTINUED | OUTPATIENT
Start: 2019-11-28 | End: 2019-12-03 | Stop reason: HOSPADM

## 2019-11-28 RX ORDER — DIAPER,BRIEF,INFANT-TODD,DISP
1 EACH MISCELLANEOUS 4 TIMES DAILY PRN
Status: DISCONTINUED | OUTPATIENT
Start: 2019-11-28 | End: 2019-12-03 | Stop reason: HOSPADM

## 2019-11-28 RX ORDER — LABETALOL 20 MG/4 ML (5 MG/ML) INTRAVENOUS SYRINGE
20 ONCE
Status: COMPLETED | OUTPATIENT
Start: 2019-11-28 | End: 2019-11-28

## 2019-11-28 RX ORDER — DOCUSATE SODIUM 100 MG/1
100 CAPSULE, LIQUID FILLED ORAL 2 TIMES DAILY
Status: DISCONTINUED | OUTPATIENT
Start: 2019-11-28 | End: 2019-12-03 | Stop reason: HOSPADM

## 2019-11-28 RX ORDER — OXYCODONE HYDROCHLORIDE 5 MG/1
5 TABLET ORAL EVERY 4 HOURS PRN
Status: DISCONTINUED | OUTPATIENT
Start: 2019-11-29 | End: 2019-12-02

## 2019-11-28 RX ORDER — BUPIVACAINE HYDROCHLORIDE 7.5 MG/ML
INJECTION, SOLUTION INTRASPINAL AS NEEDED
Status: DISCONTINUED | OUTPATIENT
Start: 2019-11-28 | End: 2019-11-28 | Stop reason: SURG

## 2019-11-28 RX ORDER — SODIUM CHLORIDE, SODIUM LACTATE, POTASSIUM CHLORIDE, CALCIUM CHLORIDE 600; 310; 30; 20 MG/100ML; MG/100ML; MG/100ML; MG/100ML
INJECTION, SOLUTION INTRAVENOUS CONTINUOUS PRN
Status: DISCONTINUED | OUTPATIENT
Start: 2019-11-28 | End: 2019-11-28 | Stop reason: SURG

## 2019-11-28 RX ORDER — MORPHINE SULFATE 0.5 MG/ML
INJECTION, SOLUTION EPIDURAL; INTRATHECAL; INTRAVENOUS AS NEEDED
Status: DISCONTINUED | OUTPATIENT
Start: 2019-11-28 | End: 2019-11-28 | Stop reason: SURG

## 2019-11-28 RX ORDER — KETOROLAC TROMETHAMINE 30 MG/ML
30 INJECTION, SOLUTION INTRAMUSCULAR; INTRAVENOUS EVERY 6 HOURS
Status: DISCONTINUED | OUTPATIENT
Start: 2019-11-28 | End: 2019-11-29

## 2019-11-28 RX ORDER — OXYCODONE HYDROCHLORIDE 5 MG/1
5 TABLET ORAL EVERY 4 HOURS PRN
Status: ACTIVE | OUTPATIENT
Start: 2019-11-28 | End: 2019-11-29

## 2019-11-28 RX ORDER — CEFAZOLIN SODIUM 2 G/50ML
2000 SOLUTION INTRAVENOUS ONCE
Status: COMPLETED | OUTPATIENT
Start: 2019-11-28 | End: 2019-11-28

## 2019-11-28 RX ORDER — LEVOTHYROXINE SODIUM 0.07 MG/1
75 TABLET ORAL
Status: DISCONTINUED | OUTPATIENT
Start: 2019-11-28 | End: 2019-11-28

## 2019-11-28 RX ORDER — METOCLOPRAMIDE HYDROCHLORIDE 5 MG/ML
5 INJECTION INTRAMUSCULAR; INTRAVENOUS EVERY 6 HOURS PRN
Status: ACTIVE | OUTPATIENT
Start: 2019-11-28 | End: 2019-11-29

## 2019-11-28 RX ORDER — SIMETHICONE 80 MG
80 TABLET,CHEWABLE ORAL 4 TIMES DAILY PRN
Status: DISCONTINUED | OUTPATIENT
Start: 2019-11-28 | End: 2019-12-03 | Stop reason: HOSPADM

## 2019-11-28 RX ORDER — SODIUM CHLORIDE, SODIUM LACTATE, POTASSIUM CHLORIDE, CALCIUM CHLORIDE 600; 310; 30; 20 MG/100ML; MG/100ML; MG/100ML; MG/100ML
50 INJECTION, SOLUTION INTRAVENOUS CONTINUOUS
Status: DISCONTINUED | OUTPATIENT
Start: 2019-11-28 | End: 2019-12-03 | Stop reason: HOSPADM

## 2019-11-28 RX ORDER — ACETAMINOPHEN 325 MG/1
975 TABLET ORAL EVERY 8 HOURS SCHEDULED
Status: DISCONTINUED | OUTPATIENT
Start: 2019-11-29 | End: 2019-12-02

## 2019-11-28 RX ORDER — NALOXONE HYDROCHLORIDE 0.4 MG/ML
0.1 INJECTION, SOLUTION INTRAMUSCULAR; INTRAVENOUS; SUBCUTANEOUS
Status: ACTIVE | OUTPATIENT
Start: 2019-11-28 | End: 2019-11-29

## 2019-11-28 RX ORDER — MAGNESIUM SULFATE HEPTAHYDRATE 40 MG/ML
1 INJECTION, SOLUTION INTRAVENOUS CONTINUOUS
Status: DISCONTINUED | OUTPATIENT
Start: 2019-11-28 | End: 2019-11-29

## 2019-11-28 RX ORDER — DEXAMETHASONE SODIUM PHOSPHATE 10 MG/ML
8 INJECTION, SOLUTION INTRAMUSCULAR; INTRAVENOUS ONCE AS NEEDED
Status: DISPENSED | OUTPATIENT
Start: 2019-11-28 | End: 2019-11-29

## 2019-11-28 RX ORDER — ONDANSETRON 2 MG/ML
INJECTION INTRAMUSCULAR; INTRAVENOUS AS NEEDED
Status: DISCONTINUED | OUTPATIENT
Start: 2019-11-28 | End: 2019-11-28 | Stop reason: SURG

## 2019-11-28 RX ORDER — OXYTOCIN/RINGER'S LACTATE 30/500 ML
62.5 PLASTIC BAG, INJECTION (ML) INTRAVENOUS CONTINUOUS
Status: DISPENSED | OUTPATIENT
Start: 2019-11-28 | End: 2019-11-28

## 2019-11-28 RX ORDER — DIPHENHYDRAMINE HYDROCHLORIDE 50 MG/ML
25 INJECTION INTRAMUSCULAR; INTRAVENOUS EVERY 6 HOURS PRN
Status: ACTIVE | OUTPATIENT
Start: 2019-11-28 | End: 2019-11-29

## 2019-11-28 RX ORDER — CALCIUM CARBONATE 200(500)MG
1000 TABLET,CHEWABLE ORAL DAILY PRN
Status: DISCONTINUED | OUTPATIENT
Start: 2019-11-28 | End: 2019-12-03 | Stop reason: HOSPADM

## 2019-11-28 RX ORDER — ONDANSETRON 2 MG/ML
4 INJECTION INTRAMUSCULAR; INTRAVENOUS EVERY 4 HOURS PRN
Status: DISPENSED | OUTPATIENT
Start: 2019-11-28 | End: 2019-11-29

## 2019-11-28 RX ORDER — SODIUM CHLORIDE, SODIUM LACTATE, POTASSIUM CHLORIDE, CALCIUM CHLORIDE 600; 310; 30; 20 MG/100ML; MG/100ML; MG/100ML; MG/100ML
125 INJECTION, SOLUTION INTRAVENOUS CONTINUOUS
Status: DISCONTINUED | OUTPATIENT
Start: 2019-11-28 | End: 2019-11-28

## 2019-11-28 RX ORDER — FENTANYL CITRATE/PF 50 MCG/ML
25 SYRINGE (ML) INJECTION
Status: DISCONTINUED | OUTPATIENT
Start: 2019-11-28 | End: 2019-12-03 | Stop reason: HOSPADM

## 2019-11-28 RX ADMIN — KETOROLAC TROMETHAMINE 30 MG: 30 INJECTION, SOLUTION INTRAMUSCULAR at 16:15

## 2019-11-28 RX ADMIN — Medication 62.5 MILLI-UNITS/MIN: at 05:52

## 2019-11-28 RX ADMIN — MAGNESIUM SULFATE HEPTAHYDRATE 2 G/HR: 40 INJECTION, SOLUTION INTRAVENOUS at 01:25

## 2019-11-28 RX ADMIN — OXYTOCIN 300 MILLI-UNITS/MIN: 10 INJECTION, SOLUTION INTRAMUSCULAR; INTRAVENOUS at 03:58

## 2019-11-28 RX ADMIN — SODIUM CHLORIDE, SODIUM LACTATE, POTASSIUM CHLORIDE, AND CALCIUM CHLORIDE 25 ML/HR: .6; .31; .03; .02 INJECTION, SOLUTION INTRAVENOUS at 05:31

## 2019-11-28 RX ADMIN — PHENYLEPHRINE HYDROCHLORIDE 100 MCG: 10 INJECTION INTRAVENOUS at 04:30

## 2019-11-28 RX ADMIN — KETOROLAC TROMETHAMINE 30 MG: 30 INJECTION, SOLUTION INTRAMUSCULAR at 04:41

## 2019-11-28 RX ADMIN — SODIUM CHLORIDE, SODIUM LACTATE, POTASSIUM CHLORIDE, AND CALCIUM CHLORIDE 125 ML/HR: .6; .31; .03; .02 INJECTION, SOLUTION INTRAVENOUS at 01:25

## 2019-11-28 RX ADMIN — BUPIVACAINE HYDROCHLORIDE IN DEXTROSE 1.6 ML: 7.5 INJECTION, SOLUTION SUBARACHNOID at 03:32

## 2019-11-28 RX ADMIN — PHENYLEPHRINE HYDROCHLORIDE 100 MCG: 10 INJECTION INTRAVENOUS at 04:00

## 2019-11-28 RX ADMIN — MORPHINE SULFATE 0.2 MG: 0.5 INJECTION, SOLUTION EPIDURAL; INTRATHECAL; INTRAVENOUS at 03:32

## 2019-11-28 RX ADMIN — SODIUM CHLORIDE, SODIUM LACTATE, POTASSIUM CHLORIDE, AND CALCIUM CHLORIDE: .6; .31; .03; .02 INJECTION, SOLUTION INTRAVENOUS at 04:15

## 2019-11-28 RX ADMIN — ONDANSETRON 4 MG: 2 INJECTION INTRAMUSCULAR; INTRAVENOUS at 16:29

## 2019-11-28 RX ADMIN — DEXAMETHASONE SODIUM PHOSPHATE 8 MG: 10 INJECTION, SOLUTION INTRAMUSCULAR; INTRAVENOUS at 10:10

## 2019-11-28 RX ADMIN — ENOXAPARIN SODIUM 40 MG: 40 INJECTION SUBCUTANEOUS at 22:03

## 2019-11-28 RX ADMIN — LABETALOL HYDROCHLORIDE 20 MG: 5 INJECTION INTRAVENOUS at 00:27

## 2019-11-28 RX ADMIN — MAGNESIUM SULFATE HEPTAHYDRATE 2 G/HR: 40 INJECTION, SOLUTION INTRAVENOUS at 11:09

## 2019-11-28 RX ADMIN — ONDANSETRON 4 MG: 2 INJECTION INTRAMUSCULAR; INTRAVENOUS at 07:23

## 2019-11-28 RX ADMIN — PHENYLEPHRINE HYDROCHLORIDE 100 MCG: 10 INJECTION INTRAVENOUS at 04:20

## 2019-11-28 RX ADMIN — MAGNESIUM SULFATE HEPTAHYDRATE 2 G/HR: 40 INJECTION, SOLUTION INTRAVENOUS at 21:09

## 2019-11-28 RX ADMIN — KETOROLAC TROMETHAMINE 30 MG: 30 INJECTION, SOLUTION INTRAMUSCULAR at 10:10

## 2019-11-28 RX ADMIN — CEFAZOLIN SODIUM 2000 MG: 2 SOLUTION INTRAVENOUS at 03:30

## 2019-11-28 RX ADMIN — SODIUM CHLORIDE, SODIUM LACTATE, POTASSIUM CHLORIDE, AND CALCIUM CHLORIDE: .6; .31; .03; .02 INJECTION, SOLUTION INTRAVENOUS at 03:02

## 2019-11-28 RX ADMIN — ONDANSETRON 4 MG: 2 INJECTION INTRAMUSCULAR; INTRAVENOUS at 04:13

## 2019-11-28 RX ADMIN — PHENYLEPHRINE HYDROCHLORIDE 100 MCG: 10 INJECTION INTRAVENOUS at 04:24

## 2019-11-28 NOTE — H&P
H&P Exam - Obstetrics   Vijay Man 38 y o  female MRN: [id number]  Unit/Bed#: LD Triage 3- Encounter: [id number]    Assessment/Plan     History of Present Illness   Chief Complaint: instructed to come to L&D due to elevated bp at home and proteinuria  HPI:  Vijay Man is a 38 y o   female with an LORETO of 2019, by Last Menstrual Period at 35w6d weeks gestation who is being admitted for Pre-eclampsia being evaluated for severe features  Her current obstetrical history is significant for twin pregnancy  Contractions: occasional   Leakage of fluid: None  Bleeding: None  Fetal movement: present  Patient having left rib pain with baby movement  Patient reports no HA, scotomata, epigastric pain  Does have significant swelling worse over last few days  Pregnancy complications: ama, ivf, di di twins       Review of Systems    Historical Information   OB History    Para Term  AB Living   1             SAB TAB Ectopic Multiple Live Births                  # Outcome Date GA Lbr Paulino/2nd Weight Sex Delivery Anes PTL Lv   1 Current              Baby complications/comments:   Past Medical History:   Diagnosis Date    AMA (advanced maternal age) multigravida 33+     Asthma     childhood    Disease of thyroid gland     Female infertility     Varicella     vac     Past Surgical History:   Procedure Laterality Date    BREAST IMPLANT      FERTILITY SURGERY      NOSE SURGERY       Social History   Social History     Substance and Sexual Activity   Alcohol Use Not Currently    Comment: socially when not pregnant     Social History     Substance and Sexual Activity   Drug Use Never     Social History     Tobacco Use   Smoking Status Never Smoker   Smokeless Tobacco Never Used         Meds/Allergies   PTA meds:   Prior to Admission Medications   Prescriptions Last Dose Informant Patient Reported? Taking?    Docosahexaenoic Acid (DHA OMEGA 3 PO)  Self Yes No   Sig: Take 1 tablet by mouth daily   calcium carbonate (OS-KHURRAM) 600 MG tablet 11/27/2019 at Unknown time Self Yes Yes   Sig: Take 600 mg by mouth daily   diphenhydrAMINE (BENADRYL) 25 mg capsule More than a month at Unknown time Self Yes No   Sig: Take 25 mg by mouth every 6 (six) hours as needed for itching   folic acid (FOLVITE) 629 MCG tablet  Self Yes No   Sig: Take 800 mcg by mouth daily   levothyroxine 75 mcg tablet  Self No No   Sig: Take 1 tablet (75 mcg total) by mouth daily   magnesium 30 MG tablet  Self Yes No   Sig: Take 30 mg by mouth daily   ondansetron (ZOFRAN) 8 mg tablet  Self No No   Sig: Take 1 tablet (8 mg total) by mouth every 8 (eight) hours as needed for nausea or vomiting   ondansetron (ZOFRAN-ODT) 8 mg disintegrating tablet  Self No No   Sig: Take 1 tablet (8 mg total) by mouth every 8 (eight) hours as needed for nausea or vomiting   vitamin E 100 UNIT capsule  Self Yes No   Sig: Take 100 Units by mouth daily      Facility-Administered Medications: None     Allergies   Allergen Reactions    Penicillins Hives and Rash       Objective   Vitals: Blood pressure (!) 154/104, pulse 94, temperature 98 3 °F (36 8 °C), temperature source Oral, resp  rate 20, last menstrual period 03/22/2019, SpO2 98 %, not currently breastfeeding  There is no height or weight on file to calculate BMI      Invasive Devices     None                 Physical Exam   Vitals:    11/27/19 2303 11/27/19 2305 11/27/19 2350 11/28/19 0006   BP: (!) 154/104  (!) 168/102 (!) 163/107   Patient Position: Sitting      Pulse: 94  94 99   Resp:  20     Temp: 98 3 °F (36 8 °C)      TempSrc: Oral      SpO2: 98%        Lungs: CTA B  Heart: RRR S1 S2  Abd: Soft gravid non tender positive bowel sounds  Ext: 2+ edema  Reflexes: 1+  FHT: 130's 135's reactive  [place]: q 2-3 (patient not really feeling much contractions)        Prenatal Labs:   Blood Type:   Lab Results   Component Value Date/Time    ABO Grouping O 07/10/2019 09:23 AM     , D (Rh type):   Lab Results   Component Value Date/Time    Rh Type RH(D) POSITIVE 07/10/2019 09:23 AM     , Antibody Screen: No results found for: ANTIBODYSCR , HCT/HGB:   Lab Results   Component Value Date/Time    Hematocrit 38 4 2019 03:51 PM    Hemoglobin 12 7 2019 03:51 PM      , Platelets:   Lab Results   Component Value Date/Time    Platelets 940  03:51 PM      , 1 hour Glucola:   Lab Results   Component Value Date/Time    Glucose 102 2019 11:48 AM   , Rubella: immune  Lab Results   Component Value Date/Time    RPR NON-REACTIVE 07/10/2019 09:23 AM      , Hep B:   Lab Results   Component Value Date/Time    Hepatitis B Surface Ag neg 07/10/2019     , HIV: No results found for: HIVAGAB  , Group B Strep: not done yet    PNC U/S  vtx/vtx normal nst/shruti      EFW A 5 lb 11 oz (2585 g)    EFW B 5 lb 15 oz (2713 g)    Assessment:  79-year-old  at 28 6/7 weeks di/di twins produce of ivf with preeclampsia evaluating for severe features  Plan: Will give labetalol, patient getting iv now, if delayed will consider oral regimen, cbc cmp tsh free t4 rapid hiv, will watch, evaluate, get labs, reviewed delivery options patient would like to proceed with  section of needs delivery does not desire induction

## 2019-11-28 NOTE — PROGRESS NOTES
Called to evaluate incision nursing  There was noted to be bleeding from the incision  On evaluation by myself and Dr Katya Araujo, the incision appears c/d/i except along the superior edge about 4-5 cm from the right end  In this area there appears to be a collection of clot 2x4 cm that is being contained by the Exofin glue over top of it  There was noted to be some oozing from underneath  A pressure dressing was applied over the area  Abdominal binder also provided in case the dressing does not hold

## 2019-11-28 NOTE — DISCHARGE SUMMARY
Discharge Summary - OB/GYN   Cristobal Oates 38 y o  female MRN: [id number]  Unit/Bed#: LD OR 2 Encounter: [id number]      Admission Date: 2019     Discharge Date: 2019    Admitting Diagnosis:   1  35 week 6 day pregnancy  2  Di/Di Twin Gestation  3  IVF Pregnancy  4  Preeclampsia w/ SF  5  Advanced maternal Age     Discharge Diagnosis:   Same, delivered    Procedures: primary  section, low transverse incision, bilateral salpingectomy    Attending: Orlin Witt MD   Discharge Attending: Dr Tiago Posadas Course:   Cristobal Oates is a 58-year-old  with di/di twins who initially presented to triage with complaint of elevated blood pressures  She was diagnosed with preeclampsia without severe features on 19  On presentation to triage she was noted to have severe range blood pressures requiring treatment with IV antihypertensives  This gave her the diagnosis of preeclampsia with severe features and the decision was made to proceed with delivery as patient is greater than 34 weeks gestation  Both babies were noted to be in vertex presentation, however patient elected to proceed with a primary  section with bilateral tubal ligation  She delivered two viable male neonates on 19 at [phone] and [phone] respectively via primary  section  Baby A weighed 5lbs 6oz and Baby B weighed 5lbs 12 oz  Apgars were 9 (1 min) and 9 (5 min) for Baby A and 6 (1 min) and 8 (5 min) for Baby B   Baby A was transferred to  nursery and Baby B was transferred to NICU  Patient tolerated the procedure well and was transferred to recovery in stable condition  Her post-operative course was complicated by preeclampsia with severe features  She received magnesium, labetalol, and procardia  Her labs were within normal limits  She was started on Procardia XL for management of her blood pressure  Preoperative hemaglobin was 13 6, postoperative was 8 7   Her postoperative pain was well controlled with oral analgesics  On day of discharge, she was ambulating and able to reasonably perform all ADLs  She was voiding and had appropriate bowel function  Pain was well controlled  She was discharged home on post-operative day #4 without complications  Patient was instructed to follow up with her OB as an outpatient and was given appropriate warnings to call provider if she develops signs of infection or uncontrolled pain  Complications: none apparent    Condition at discharge: good     Discharge instructions/Information to patient and family:   See after visit summary for information provided to patient and family  Provisions for Follow-Up Care:  See after visit summary for information related to follow-up care and any pertinent home health orders  Disposition: Home    Planned Readmission: No    Discharge Medications: For a complete list of the patient's medications, please refer to her med rec  Kelly Botello MD  OB/GYN  12/2/2019  6:44 AM

## 2019-11-28 NOTE — ANESTHESIA PROCEDURE NOTES
Spinal Block    Patient location during procedure: OR  Start time: 11/28/2019 3:30 AM  Reason for block: primary anesthetic  Staffing  Anesthesiologist: Michele Jacobs MD  Performed: anesthesiologist   Preanesthetic Checklist  Completed: patient identified, surgical consent, pre-op evaluation, timeout performed, IV checked, risks and benefits discussed and monitors and equipment checked  Spinal Block  Patient position: sitting  Prep: Betadine and site prepped and draped  Patient monitoring: cardiac monitor, continuous pulse ox, frequent blood pressure checks and heart rate  Approach: midline  Location: L3-4  Injection technique: single-shot  Needle  Needle type: pencil-tip   Needle gauge: 25 G  Needle length: 10 cm  Assessment  Injection Assessment:  negative aspiration for heme, no paresthesia on injection and positive aspiration for clear CSF    Post-procedure:  site cleaned

## 2019-11-28 NOTE — PROGRESS NOTES
Phone call to patient to follow up regarding dr cassidy call to her asking her to check bp  Left message With review of signs sxs to watch for and to please call dr cassidy or to call me through answering service with bp result and any questions or concerns

## 2019-11-28 NOTE — ANESTHESIA POSTPROCEDURE EVALUATION
Post-Op Assessment Note    CV Status:  Stable  Pain Score: 0    Pain management: adequate     Mental Status:  Alert and awake   Hydration Status:  Stable   PONV Controlled:  None   Airway Patency:  Patent   Post Op Vitals Reviewed: Yes      Staff: CRNA           BP  118/87   Temp   97 9   Pulse  74   Resp   14   SpO2   95

## 2019-11-28 NOTE — OP NOTE
Operative Report - OB/GYN   Dan Kehr 38 y o  female MRN: [id number]  Unit/Bed#: LD OR 2 Encounter: [id number]    Indications: Preeclampsia with severe features, Di/Di pregnancy    Pre-operative Diagnosis:   1  35 week 6 day pregnancy  2  Di/Di Twin Gestation  3  IVF Pregnancy  4  Preeclampsia w/ SF  5  Advanced maternal Age    Post-operative Diagnosis: same, delivered    Surgeon: Jose Garsia MD    Assistant(s): Aaliyah Roach MD    Findings:  1  Delivery of two viable male neonates on 19 at 0356 and 0357, weight 5lbs 6oz for Baby A and 5lbs 12oz for baby B ;  Apgar scores of 9 at one minute and 9 at five minutes for Baby A and  6 at one minute and 8 at five minutes for Baby B    2  Normal appearing placenta with centrally-inserted 3 vessel cord  3  Clear amniotic fluid  4  Grossly normal uterus, tubes, and ovaries    Specimens:   1  Arterial and venous cord gases  2  Cord blood  3  Segment of umbilical cord  4   Placenta to pathology storage     Blood gas, venous, cord    Collection Time: 19  3:57 AM   Result Value Ref Range    pH, Cord Mohit 7 201 7 190 - 7 490    pCO2, Cord Mohit 55 0 (H) 27 0 - 43 0 mm HG    pO2, Cord Mohit 14 1 (L) 15 0 - 45 0 mm HG    HCO3, Cord Mohit 21 1 12 2 - 28 6 mmol/L    Base Exc, Cord Mhoit -7 6 (L) 1 0 - 9 0 mmol/L    O2 Cont, Cord Mohit 4 5 mL/dL    O2 HGB,VENOUS CORD 20 2 %   Blood gas, arterial, cord    Collection Time: 19  3:57 AM   Result Value Ref Range    pH, Cord Art 7 147 (L) 7 230 - 7 430    pCO2, Cord Art 62 4 (H) 30 0 - 60 0    pO2, Cord Art 10 0 5 0 - 25 0 mm HG    HCO3, Cord Art 21 1 17 3 - 27 3 mmol/L    Base Exc, Cord Art -8 7 (L) 3 0 - 11 0 mmol/L    O2 Content, Cord Art 2 0 ml/dl    O2 Hgb, Arterial Cord 9 5 %   Blood gas, venous, cord    Collection Time: 19  3:58 AM   Result Value Ref Range    pH, Cord Mohit 7 302 7 190 - 7 490    pCO2, Cord Mohit 41 2 27 0 - 43 0 mm HG    pO2, Cord Mohit 27 4 15 0 - 45 0 mm HG    HCO3, Cord Mohit 19 9 12 2 - 28 6 mmol/L    Base Exc, Cord Mohit -6 1 (L) 1 0 - 9 0 mmol/L    O2 Cont, Cord Mohit 13 7 mL/dL    O2 HGB,VENOUS CORD 60 3 %   Blood gas, arterial, cord    Collection Time: 19  3:58 AM   Result Value Ref Range    pH, Cord Art 7 257 7 230 - 7 430    pCO2, Cord Art 44 0 30 0 - 60 0    pO2, Cord Art 25 6 (H) 5 0 - 25 0 mm HG    HCO3, Cord Art 19 2 17 3 - 27 3 mmol/L    Base Exc, Cord Art -7 8 (L) 3 0 - 11 0 mmol/L    O2 Content, Cord Art 11 2 ml/dl    O2 Hgb, Arterial Cord 50 5 %         Estimated Blood Loss:  221 mL           Total IV Fluids: 1500 mL    Drains:   Urethral Catheter Latex; Non-latex 16 Fr  (Active)   Reasons to continue Urinary Catheter  Post-operative urological requirements 2019  4:50 AM   Goal for Removal Remove POD#1 2019  4:50 AM   Site Assessment Clean;Skin intact 2019  5:05 AM   Collection Container Standard drainage bag 2019  5:05 AM   Securement Method Securing device (Describe) 2019  4:50 AM   Number of days: 0           Complications:  None; patient tolerated the procedure well  Disposition: PACU            Condition: stable    Procedure Details   Patricia Napier is a 41-year-old  with di/di twins who initially presented to triage with complaint of elevated blood pressures  She was diagnosed with preeclampsia without severe features on 19  On presentation to triage she was noted to have severe range blood pressures requiring treatment with IV antihypertensives  This gave her the diagnosis of preeclampsia with severe features and the decision was made to proceed with delivery as patient is greater than 34 weeks gestation  Both babies were noted to be in vertex presentation, however patient elected to proceed with a primary  section with bilateral tubal ligation  Risks, benefits, possible complications, alternate treatment options, and expected outcomes were discussed with the patient    The patient agreed with the proposed plan and gave informed consent  The patient was taken to the operating room where she was properly identified to the OR staff and attending physician  She received spinal anesthesia intraoperatively without difficulty  Fetal heart tones were appreciated and found to be appropriate  A Martínez catheter was aseptically inserted and SCDs were placed  The vagina was prepped with chlorhexidine and the abdomen was prepped with Chloraprep  Following appropriate drying time, the patient was draped in the usual sterile manner for a Pfannenstiel incision  The patient had received Ancef 2g IV pre-operatively for prophylaxis  A Time Out was held and the above information confirmed  The patient was identified as Azucena Sagastume and the procedure verified as  Delivery  A Pfannenstiel incision was made and carried down through the underlying subcutaneous tissue to the fascia using a scalpel  Rectus fascia was then incised in the midline and extended laterally using Jaramillo scissors  The superior edge of the fascia was grasped with Kocher clamps, tented upward, and the underlying muscle was bluntly dissected off  The inferior edge was grasped with Kocher clamps and cleared in similar fashion  All anatomic layers were well-demarcated  The rectus muscles were  and the peritoneum was identified and subsequently entered and extended longitudinally with blunt dissection  The Carlos retractor was then inserted  A low transverse uterine incision was made with the scalpel and extended laterally with blunt dissection  The amnion was entered sharply  Surgeons hand was inserted through the hysterotomy and the fetal head was palpated, elevated, and delivered through the uterine incision with the assistance of fundal pressure  Baby had spontaneous cry with good color and tone  The umbilical cord was clamped and cut  The infant was handed off to the  providers    Surgeon's hand was again inserted through the hysterotomy and the amniotic sac for fetus B was ruptured  The  fetal head of baby B was palpated, elevated and delivered through the uterine incision with assistance of fundal pressure  Arterial and venous cord gases, cord blood, and a segment of umbilical cord were obtained for evaluation and promptly sent to the lab  The placenta was manually extracted  This was also sent to the lab for placental pathology  The uterus was exteriorized and a moist lap sponge was used to clear the cavity of clots and products of conception  The uterine incision was closed with a running locked suture of 0 Vicryl  A second layer of the same suture was used to imbricate the first   Good hemostasis was confirmed upon uterine closure  At this point, our attention turned to the adnexa  The bilateral Fallopian tubes were identified  First the right fallopian tube was identified and grasped with a María Lightning in an avascular area and tented up  Bovie cautery was used to create defects in the mesosalpinx  Plain suture was then used to ligate the fallopian tube which was then excised with Metzenbaum scissors  This was repeated on the left side  Good hemostasis was noted and both fallopian tubes were sent for pathology  Warmed normal saline solution was used to irrigate the posterior culdesac and the uterus was returned to the abdomen  The paracolic gutters were inspected and cleared of all clots and debris with irrigation and moist lap sponges  The fascia was closed with a running suture of 0 Vicryl  The skin was closed with 4-0 Vicryl in a subcuticular fashion  Sterile dressing was applied to the incision  At the conclusion of the procedure, all needle, sponge, and instrument counts were noted to be correct x2  The patient tolerated the procedure well and was transferred to her the recovery room in stable condition  Dr Chan Goldstein was present and participated in all key portions of the case        Melissa Marie MD, MPH  OB/GYN, PGY4  11/28/2019, 7:20 AM

## 2019-11-28 NOTE — TELEPHONE ENCOUNTER
Abi Bains was originally scheduled to see her OB providers today but switched to Friday morning so that she could see a physician instead of her nurse practitioner  Her blood work last night showed a significant increase in proteinuria with a protein creatinine ratio of 2 09  This in addition to her elevated blood pressures now shows that she has preeclampsia  She denies any signs or symptoms of preeclampsia currently  Due to her significantly elevated protein creatinine ratio though I asked if she could have a blood pressure checked either at a local pharmacy or if none are available then I would suggest she go to Labor and delivery for blood pressures  If stable without severe range blood pressures then she can be sent home with plans to follow up at her scheduled ob visit Friday am  Dr Ginny Chambers was made aware       Beka Gil MD

## 2019-11-28 NOTE — ANESTHESIA PREPROCEDURE EVALUATION
Review of Systems/Medical History  Patient summary reviewed    No history of anesthetic complications     Cardiovascular  Hypertension pregnancy-induced hypertension,   Comment: Pre eclampsia with severe features,  Pulmonary  Asthma , well controlled/ stable ,        GI/Hepatic            Endo/Other     GYN  Currently pregnant ,     Comment: Twin pregnancy, elderly     Hematology   Musculoskeletal       Neurology   Psychology           Physical Exam    Airway    Mallampati score: I  TM Distance: >3 FB  Neck ROM: full     Dental   No notable dental hx     Cardiovascular      Pulmonary      Other Findings        Anesthesia Plan  ASA Score- 3 Emergent    Anesthesia Type- spinal with ASA Monitors  Additional Monitors:   Airway Plan:         Plan Factors-  Patient did not smoke on day of surgery  Induction-     Postoperative Plan-     Informed Consent- Anesthetic plan and risks discussed with patient

## 2019-11-28 NOTE — LACTATION NOTE
This note was copied from a baby's chart  Feed attempted at this time  Infant is sleepy but is placed partially skin to skin and offered expressed milk  Mom hand expresses independently after review  After ~10min of attempting with HE baby latches and maintains rhythmic suck   Attempt with minimal assistance from [facility]

## 2019-11-28 NOTE — PLAN OF CARE
Problem: BIRTH - VAGINAL/ SECTION  Goal: Fetal and maternal status remain reassuring during the birth process  Description  INTERVENTIONS:  - Monitor vital signs  - Monitor fetal heart rate  - Monitor uterine activity  - Monitor labor progression (vaginal delivery)  - DVT prophylaxis  - Antibiotic prophylaxis  Outcome: Completed  Goal: Emotionally satisfying birthing experience for mother/fetus  Description  Interventions:  - Assess, plan, implement and evaluate the nursing care given to the patient in labor  - Advocate the philosophy that each childbirth experience is a unique experience and support the family's chosen level of involvement and control during the labor process   - Actively participate in both the patient's and family's teaching of the birth process  - Consider cultural, Christianity and age-specific factors and plan care for the patient in labor  Outcome: Completed     Problem: POSTPARTUM  Goal: Experiences normal postpartum course  Description  INTERVENTIONS:  - Monitor maternal vital signs  - Assess uterine involution and lochia  Outcome: Progressing  Goal: Appropriate maternal -  bonding  Description  INTERVENTIONS:  - Identify family support  - Assess for appropriate maternal/infant bonding   -Encourage maternal/infant bonding opportunities  - Referral to  or  as needed  Outcome: Progressing  Goal: Establishment of infant feeding pattern  Description  INTERVENTIONS:  - Assess breast/bottle feeding  - Refer to lactation as needed  Outcome: Progressing  Goal: Incision(s), wounds(s) or drain site(s) healing without S/S of infection  Description  INTERVENTIONS  - Assess and document risk factors for skin impairment   - Assess and document dressing, incision, wound bed, drain sites and surrounding tissue  - Consider nutrition services referral as needed  - Oral mucous membranes remain intact  - Provide patient/ family education  Outcome: Progressing     Problem: PAIN - ADULT  Goal: Verbalizes/displays adequate comfort level or baseline comfort level  Description  Interventions:  - Encourage patient to monitor pain and request assistance  - Assess pain using appropriate pain scale  - Administer analgesics based on type and severity of pain and evaluate response  - Implement non-pharmacological measures as appropriate and evaluate response  - Consider cultural and social influences on pain and pain management  - Notify physician/advanced practitioner if interventions unsuccessful or patient reports new pain  Outcome: Progressing     Problem: SAFETY ADULT  Goal: Patient will remain free of falls  Description  INTERVENTIONS:  - Assess patient frequently for physical needs  -  Identify cognitive and physical deficits and behaviors that affect risk of falls    -  [place] fall precautions as indicated by assessment   - Educate patient/family on patient safety including physical limitations  - Instruct patient to call for assistance with activity based on assessment  - Modify environment to reduce risk of injury  - Consider OT/PT consult to assist with strengthening/mobility  Outcome: Progressing  Goal: Maintain or return to baseline ADL function  Description  INTERVENTIONS:  -  Assess patient's ability to carry out ADLs; assess patient's baseline for ADL function and identify physical deficits which impact ability to perform ADLs (bathing, care of mouth/teeth, toileting, grooming, dressing, etc )  - Assess/evaluate cause of self-care deficits   - Assess range of motion  - Assess patient's mobility; develop plan if impaired  - Assess patient's need for assistive devices and provide as appropriate  - Encourage maximum independence but intervene and supervise when necessary  - Involve family in performance of ADLs  - Assess for home care needs following discharge   - Consider OT consult to assist with ADL evaluation and planning for discharge  - Provide patient education as appropriate  Outcome: Progressing  Goal: Maintain or return mobility status to optimal level  Description  INTERVENTIONS:  - Assess patient's baseline mobility status (ambulation, transfers, stairs, etc )    - Identify cognitive and physical deficits and behaviors that affect mobility  - Identify mobility aids required to assist with transfers and/or ambulation (gait belt, sit-to-stand, lift, walker, cane, etc )  - [place] fall precautions as indicated by assessment  - Record patient progress and toleration of activity level on Mobility SBAR; progress patient to next Phase/Stage  - Instruct patient to call for assistance with activity based on assessment  - Consider rehabilitation consult to assist with strengthening/weightbearing, etc   Outcome: Progressing     Problem: DISCHARGE PLANNING  Goal: Discharge to home or other facility with appropriate resources  Description  INTERVENTIONS:  - Identify barriers to discharge w/patient and caregiver  - Arrange for needed discharge resources and transportation as appropriate  - Identify discharge learning needs (meds, wound care, etc )  - Arrange for interpretive services to assist at discharge as needed  - Refer to Case Management Department for coordinating discharge planning if the patient needs post-hospital services based on physician/advanced practitioner order or complex needs related to functional status, cognitive ability, or social support system  Outcome: Progressing

## 2019-11-28 NOTE — DISCHARGE INSTRUCTIONS
Section   WHAT YOU SHOULD KNOW:   A  delivery, or , is abdominal surgery to deliver your baby  There are many reasons you may need a   · A  may be scheduled before labor if you had a  with your last baby  It may be scheduled if your baby is not positioned normally, or you are pregnant with more than 1 baby  · Your caregiver may perform an emergency  during labor to prevent life-threatening complications for you or your baby  A  may be done if your cervix does not dilate after several hours of active labor  · Other reasons for a  include maternal infections and problems with the placenta  AFTER YOU LEAVE:   Medicines:   · Prescription pain medicine  may be given  Ask how to take this medicine safely  · Acetaminophen  decreases pain and fever  It is available without a doctor's order  Ask how much to take and how often to take it  Follow directions  Acetaminophen can cause liver damage if not taken correctly  · NSAIDs  help decrease swelling and pain or fever  This medicine is available with or without a doctor's order  NSAIDs can cause stomach bleeding or kidney problems in certain people  If you take blood thinner medicine, always ask your obstetrician if NSAIDs are safe for you  Always read the medicine label and follow directions  · Take your medicine as directed  Contact your obstetrician (OB) if you think your medicine is not helping or if you have side effects  Tell him if you are allergic to any medicine  Keep a list of the medicines, vitamins, and herbs you take  Include the amounts, and when and why you take them  Bring the list or the pill bottles to follow-up visits  Carry your medicine list with you in case of an emergency  Follow up with your OB as directed: You may need to return to have your stitches or staples removed   Write down your questions so you remember to ask them during your visits  Wound care:  Carefully wash your wound with soap and water every day  Keep your wound clean and dry  Wear loose, comfortable clothes that do not rub against your wound  Ask your OB about bathing and showering  Drink plenty of liquids: You can lower your risk for a blood clot if you drink plenty of liquids  Ask how much liquid to drink each day and which liquids are best for you  Limit activity until you have fully recovered from surgery:   · Ask when it is safe for you to drive, walk up stairs, lift heavy objects, and have sex  · Ask when it is okay to exercise, and what types of exercise to do  Start slowly and do more as you get stronger  Contact your OB if:   · You have heavy vaginal bleeding that fills 1 or more sanitary pads in 1 hour  · You have a fever  · Your incision is swollen, red, or draining pus  · You have questions or concerns about yourself or your baby  Seek care immediately or call 911 if:   · Blood soaks through your bandage  · Your stitches come apart  · You feel lightheaded, short of breath, and have chest pain  · You cough up blood  · Your arm or leg feels warm, tender, and painful  It may look swollen and red  © 2014 [address] is for End User's use only and may not be sold, redistributed or otherwise used for commercial purposes  All illustrations and images included in CareNotes® are the copyrighted property of [employer]  or Chris Spencer  The above information is an  only  It is not intended as medical advice for individual conditions or treatments  Talk to your doctor, nurse or pharmacist before following any medical regimen to see if it is safe and effective for you  Preeclampsia   WHAT YOU NEED TO KNOW:   Preeclampsia is a condition that can develop during week 20 or later of your pregnancy  Preeclampsia means you have high blood pressure and may have protein in your urine  Preeclampsia can cause mild to life-threatening health problems for you and your unborn baby  DISCHARGE INSTRUCTIONS:   Call 911 for any of the following:   · You have a seizure  · You have severe abdominal pain with nausea and vomiting  Seek care immediately if:   · You develop a severe headache that does not go away  · You have blurred or spotted vision that does not go away  · You are bleeding from your vagina  · You have new or increased swelling in your face or hands  · You are urinating little or not at all  Contact your obstetrician if:   · You are urinating less than usual      · You do not feel your baby's movement as often as usual      · You have questions or concerns about your condition or care  Medicines:   · Blood pressure medicine  helps lower your blood pressure and protects your heart, lungs, brain, and kidneys  Take your blood pressure medicine exactly as directed  · Low doses of aspirin  may be recommended after 12 weeks of pregnancy if you are at high risk for preeclampsia  Aspirin may help prevent preeclampsia or problems that can happen from preeclampsia  Do not take aspirin unless directed by your healthcare provider  · Take your medicine as directed  Contact your healthcare provider if you think your medicine is not helping or if you have side effects  Tell him or her if you are allergic to any medicine  Keep a list of the medicines, vitamins, and herbs you take  Include the amounts, and when and why you take them  Bring the list or the pill bottles to follow-up visits  Carry your medicine list with you in case of an emergency  Follow up with your obstetrician as directed: You will need tests 1 to 2 times a week to check your condition  Tests include blood pressure checks, urine and blood tests, and fetal monitoring  Write down your questions so you remember to ask them during your visits  Steps to take if you have a seizure:   You may have seizures if you develop eclampsia  You may feel confused, tense, or aggressive when the seizure ends  Tell your family, friends, or coworkers to do the following if you have a seizure:  · Clear the area to help prevent injuries from falls    · Place you on your left side so you do not choke    · Give oxygen if it is available    · Call 911     · Stay with you until medical help arrives  Blood pressure checks: You may need to check your blood pressure each day  Your obstetrician will teach you how to check your blood pressure at home  Measure your blood pressure on the same arm and in the same position each time  Write down the date and time you take your blood pressure, and bring your notes to your prenatal visits  Kick counts: You may need to keep track of how often your baby moves or kicks over a certain amount of time  Ask your obstetrician how to do kick counts and how often to do them  Daily weight:  Weigh yourself every day before breakfast  Weight gain can be a sign of extra fluid in your body  Call your obstetrician if you have gained 2 or more pounds in a week  Rest:  Your obstetrician may tell you to rest more often if you have mild symptoms of preeclampsia  You may need total bedrest for more severe symptoms  Try to lie on your left side  © 2017 [address]  Information is for End User's use only and may not be sold, redistributed or otherwise used for commercial purposes  All illustrations and images included in CareNotes® are the copyrighted property of A D [employer] M [employer]  or Chris Spencer  The above information is an  only  It is not intended as medical advice for individual conditions or treatments  Talk to your doctor, nurse or pharmacist before following any medical regimen to see if it is safe and effective for you  Postpartum Bleeding   WHAT YOU NEED TO KNOW:   Postpartum bleeding is vaginal bleeding after childbirth   This bleeding is normal, whether your baby was born vaginally or by   It contains blood and the tissue that lined the inside of your uterus when you were pregnant  DISCHARGE INSTRUCTIONS:   What to expect with postpartum bleeding:  Postpartum bleeding usually lasts at least 10 days, and may last longer than 6 weeks  Your bleeding may range from light (barely staining a pad) to heavy (soaking a pad in 1 hour)  Usually, you have heavier bleeding right after childbirth, which slows over the next few weeks until it stops  The bleeding is red or dark brown with clots for the first 1 to 3 days  It then turns pink for several days, and then becomes a white or yellow discharge until it ends  Follow up with your obstetrician as directed:  Do not have sex until your obstetrician says it is okay  Write down your questions so you remember to ask them during your visits  Contact your healthcare provider or obstetrician if:   · Your bleeding increases, or you have heavy bleeding that soaks a pad in 1 hour for 2 hours in a row  · You pass large blood clots  · You are breathing faster than normal, or your heart is beating faster than normal     · You are urinating less than usual, or not at all  · You feel dizzy  · You have questions or concerns about your condition or care  Seek immediate care or call 911 if:   · You are suddenly short of breath and feel lightheaded  · You have sudden chest pain  ©  [address] Information is for End User's use only and may not be sold, redistributed or otherwise used for commercial purposes  All illustrations and images included in CareNotes® are the copyrighted property of A [employer] M [employer]  or Chris Spencer  The above information is an  only  It is not intended as medical advice for individual conditions or treatments  Talk to your doctor, nurse or pharmacist before following any medical regimen to see if it is safe and effective for you        Postpartum Depression WHAT YOU NEED TO KNOW:   What is postpartum depression? Postpartum depression is a mood disorder that occurs after giving birth  A mood is an emotion or a feeling  Moods affect your behavior and how you feel about yourself and life in general  Depression is a sad mood that you cannot control  Women often feel sad, afraid, or nervous after their baby is born  These feelings are called postpartum blues or baby blues, and they usually go away in 1 to 2 weeks  With postpartum depression, these symptoms get worse and continue for more than 2 weeks  Postpartum depression is a serious condition that affects your daily activities and relationships  What causes postpartum depression? Healthcare providers do not know exactly what causes postpartum depression  It may be caused by a sudden drop in hormone levels after childbirth  A previous episode of postpartum depression or a family history of depression may increase your risk  Several things may trigger postpartum depression:  · Lack of support from the baby's father or other family members    · Feeling more tired than usual    · Stress, a poor diet, or lack of sleep    · Pain after childbirth or pain during breastfeeding    · Sudden change in lifestyle  How is postpartum depression diagnosed? Postpartum depression affects your daily activities and your relationships with other people  Healthcare providers will ask you questions about your signs and symptoms and how they are affecting your life  The symptoms of postpartum depression usually begin within 1 month after childbirth  You feel depressed or lose interest in activities you enjoy nearly every day for at least 2 weeks  You also have 4 or more of the following symptoms:  · You feel tired or have less energy than usual      · You feel unimportant or guilty most of the time  · You think about hurting or killing yourself  · Your appetite changes  You may lose your appetite and lose weight without trying   Your appetite may also increase and you may gain weight  · You are restless, irritable, or withdrawn  · You have trouble concentrating and remembering things  You have trouble doing daily tasks or making decisions  · You have trouble sleeping, even after the baby is asleep  How is postpartum depression treated? · Psychotherapy:  During therapy, you will talk with healthcare providers about how to cope with your feelings and moods  This can be done alone or in a group  It may also be done with family members or your partner  · Antidepressants: This medicine is given to decrease or stop the symptoms of depression  You usually need to take antidepressants for several weeks before you begin to feel better  Do not stop taking antidepressants unless your healthcare provider tells you to  Healthcare providers may try a different antidepressant if one type does not work  What can I do to feel better? · Rest:  Do not try to do everything all at the same time  Do only what is needed and let other things wait until later  Ask your family or friends for help, especially if you have other children  Ask your partner to help with night feedings or other baby care  Try to sleep when the baby naps  · Get emotional support:  Share your feelings with your partner, a friend, or another mother  · Take care of yourself:  Shower and dress each day  Do not skip meals  Try to get out of the house a little each day  Get regular exercise  Eat a healthy diet  Avoid alcohol because it can make your depression worse  Do not isolate yourself  Go for a walk or meet with a friend  It is also important that you have some time by yourself each day  How do I find support and more information?    · [address] [place] [address], Public Information & Communication Branch  [address] [place] [address] W, [address] [facility], [address]  [place] [address]   Phone: [phone]  Phone: [phone]  Web Address: [employer] tn  When should I contact my healthcare provider? · You cannot make it to your next visit  · Your depression does not get better with treatment or it gets worse  · You have questions or concerns about your condition or care  When should I seek immediate care or call 911? · You think about hurting or killing yourself, your baby, or someone else  · You feel like other people want to hurt you  · You hear voices telling you to hurt yourself or your baby  CARE AGREEMENT:   You have the right to help plan your care  Learn about your health condition and how it may be treated  Discuss treatment options with your caregivers to decide what care you want to receive  You always have the right to refuse treatment  The above information is an  only  It is not intended as medical advice for individual conditions or treatments  Talk to your doctor, nurse or pharmacist before following any medical regimen to see if it is safe and effective for you  © 2017 [address] Information is for End User's use only and may not be sold, redistributed or otherwise used for commercial purposes  All illustrations and images included in CareNotes® are the copyrighted property of [employer]  or Chris Spencer  Postpartum Perineal Care   WHAT YOU NEED TO KNOW:   Postpartum perineal care is care for your perineum after you have a baby  The perineum is your vagina and anus  DISCHARGE INSTRUCTIONS:   Care for your perineum:  Healthcare providers will give you a small squirt bottle and show you how to use it   Do the following after you use the toilet and before you put on a new pad:  · Remove the soiled pad    · Use the squirt bottle to rinse your perineum from front to back while you sit on the toilet     · Pat the area dry from front to back with toilet paper or a cotton cloth     · Put on a fresh pad    · Wash your hands  Decrease pain:  Ask your healthcare provider about these and other ways to decrease perineal pain:  · Sitz baths:  Healthcare providers may give you a portable sitz bath  This is a small tub that fits in the toilet  Fill the sitz bath or bathtub with 4 to 6 inches of warm water  Sit in the warm water for 20 minutes 2 to 3 times a day  · Ice:  Ice helps decrease swelling and pain  Ice may also help prevent tissue damage  Use an ice pack, or put crushed ice in a plastic bag  Cover it with a towel and place it on your perineum for 15 to 20 minutes every hour, or as directed  · Medicine spray, wipes, or pads:  Healthcare providers may give you a medicine spray or wipes soaked with numbing medicine to decrease the pain  Pads that contain an herb called witch hazel may also help reduce pain  Use these after perineal care or a sitz bath  Follow up with your healthcare provider as directed:  Write down your questions so you remember to ask them during your visits  Contact your healthcare provider if:   · You have heavy vaginal bleeding that fills 1 or more sanitary pads in 1 hour  · You have foul-smelling vaginal discharge  · You feel weak or lightheaded  · You have questions or concerns about your condition or care  Seek care immediately or call 911 if:   · You have large blood clots or bright red blood coming from your vagina  · You have abdominal pain, vomiting, and a fever  © 2017 [address]  Information is for End User's use only and may not be sold, redistributed or otherwise used for commercial purposes  All illustrations and images included in CareNotes® are the copyrighted property of [employer]  or Chris Spencer  The above information is an  only  It is not intended as medical advice for individual conditions or treatments  Talk to your doctor, nurse or pharmacist before following any medical regimen to see if it is safe and effective for you        Self Care After Delivery AMBULATORY CARE:   The postpartum period  is the period of time from delivery to about 6 weeks  During this time you may experience many physical and emotional changes  It is important to understand what is normal and when you need to call your healthcare provider  It is also important to know how to care for yourself during this time  Call 911 for any of the following:   · You soak through 1 pad in 15 minutes, have blurry vision, clammy or pale skin, and feel faint  · You faint or lose consciousness  · Your heart is beating faster than normal      · You have trouble breathing  · You cough up blood  Seek care immediately if:   · You soak through 1 or more pads in an hour, or pass blood clots larger than a quarter from your vagina  · Your leg is painful, red, and larger than normal      · You have severe abdominal pain  · You have a bad headache or changes in your vision  · Your episiotomy or C section incision is red, swollen, bleeding, or draining pus  · Your incision comes apart  · You see or hear things that are not there, or have thoughts of harming yourself or your baby  Contact your obstetrician or midwife if:   · You have a fever  · You have new or worsening pain in your abdomen or vagina  · You continue to have the baby blues 10 days after you deliver  · You have trouble sleeping  · You have foul-smelling discharge from your vagina  · You have pain or burning when you urinate  · You do not have a bowel movement for 3 days or more  · You have nausea or are vomiting  · You have hard lumps or red streaks over your breasts  · You have cracked nipples or bleed from your nipples  · You have questions or concerns about your condition or care  Physical changes:   The following are normal changes after you give birth:  · Pain in the area between your anus and vagina    · Breast pain    · Constipation or hemorrhoids    · Hot or cold flashes    · Vaginal bleeding or discharge    · Mild to moderate abdominal cramping    · Difficulty controlling bowel movements or urine  Emotional changes: The following are symptoms of the baby blues, or normal emotions after you give birth  The changes in your emotions may be caused by a drop in hormone levels after you deliver  If these symptoms last longer than 1 to 2 weeks after you give birth, you may have postpartum depression  · Feeling irritable    · Feeling sad    · Crying for no reason    · Feeling anxious  Breast care for nursing mothers: You may have sore breasts for 3 to 6 days after you give birth  This happens as your milk begins to fill your breasts  You may also have sore breasts if you do not breastfeed frequently  Do the following to care for your breasts:  · Apply a moist, warm, compress to your breast as directed  This may help soothe your breasts  Make sure the washcloth is not too hot before you apply it to your breast      · Nurse your baby or pump your milk frequently  This may prevent clogged milk ducts  Ask your healthcare provider how often to nurse or pump  · Massage your breasts as directed  This may help increase your milk flow  Gently rub your breasts in a circular motion before you breastfeed  You may need to gently squeeze your breast or nipple to help release milk  You can also use a breast pump to help release milk from your breast      · Wash your breasts with warm water only  Do not put soap on your nipples  Soap may cause your nipples to become dry  · Apply lanolin cream to your nipples as directed  Lanolin cream may add moisture to your skin and prevent nipple dryness  Always  wash off lanolin cream with warm water before you breastfeed  · Place pads in your bra  Your nipples may leak milk when you are not breastfeeding  You can place pads inside of your bra to help prevent leaking onto your clothing   Ask your healthcare provider where to purchase bra pads      · Get breastfeeding support if needed  There are healthcare providers who can answer questions about breastfeeding and provide you with support  Ask your healthcare provider who you can contact if you need breastfeeding support  Breast care for non-nursing mothers:  Milk will fill your breasts even if you bottle feed your baby  Do the following to help stop your milk from filling your breasts and causing pain:  · Wear a bra with support at all times  A sports bra or a tight-fitting bra will help stop your milk from coming in  · Apply ice on each breast for 15 to 20 minutes every hour or as directed  Use an ice pack, or put crushed ice in a plastic bag  Cover it with a towel  Ice helps your milk ducts shrink  · Keep your breasts away from warm water  Warm water will make it easier for milk to fill your breasts  Stand with your breasts away from warm water in the shower  · Limit how much you touch your breasts  This will prevent them from filling with milk  Perineum care: Your perineum is the area between your rectum and vagina  It is normal to have swelling and pain in this area after you give birth  If you had an episiotomy, your healthcare provider may give you special instructions  · Clean your perineum after you use the bathroom  This may prevent infection and help with healing  Use a spray bottle with warm water to clean your perineum  You may also gently spray warm water against your perineum when you urinate  Always wipe front to back  · Take a sitz bath as directed  A sitz bath may help relieve swelling and pain  Fill your bath tub or bucket with water up to your hips and sit in the water  Use cold water for 2 days after you deliver  Then use warm water  Ask your healthcare provider for more information about a sitz bath  · Apply ice packs for the first 24 hours or as directed  Use a plastic glove filled with ice or buy an ice pack   Wrap the ice pack or plastic glove in a small towel or wash cloth  Place the ice pack on your perineum for 20 minutes at a time  · Sit on a donut-shaped pillow  This may relieve pressure on your perineum when you sit  · Use wipes with medicine or take pills as directed  Your healthcare provider may tell you to use witch hazel pads  You can place witch hazel pads in the refrigerator before you apply them to your perineum  He may also tell you to take NSAIDs  Ask your healthcare provider how often to take pills or use wipes with medicine  · Do not go swimming or take tub baths for 4 to 6 weeks or as directed  This will help prevent an infection in your vagina or uterus  Bowel and bladder care: It may take 3 to 5 days to have a bowel movement after you deliver your baby  You can do the following to prevent or manage constipation, and get control of your bowel or bladder:  · Take stool softeners as directed  A stool softener is medicine that will make your bowel movements softer  This may prevent or relieve constipation  A stool softener may also make bowel movements less painful  · Drink plenty of liquids  Ask how much liquid to drink each day and which liquids are best for you  Liquids may help prevent constipation  · Eat foods high in fiber  Examples include fruits, vegetables, grains, beans, and lentils  Ask your healthcare provider how much fiber you need each day  Fiber may prevent constipation  · Do Kegel exercises as directed  Kegel exercises will help strengthen the muscles that control bowel movements and urination  Ask your healthcare provider for more information on Kegel exercises  · Apply cold compresses or medicine to hemorrhoids as directed  This may relieve swelling and pain  Your healthcare provider may tell you to apply ice or wipes with medicine to your hemorrhoids  He may also tell you to use a sitz bath  Ask your healthcare for more information on how to manage hemorrhoids    Nutrition:  Good nutrition is important in the postpartum period  It will help you return to a healthy weight, increase your energy levels, and prevent constipation  It will also help you get enough nutrients and calories if you are going to breastfeed your baby  · Eat a variety of healthy foods  Healthy foods include fruits, vegetables, whole-grain breads, low-fat dairy products, beans, lean meats, and fish  You may need 500 to 700 additional calories each day if you breastfeed your baby  You may also need extra protein  · Limit foods with added sugar and high amounts of fat  These foods are high in calories and low in healthy nutrients  Read food labels so you know how much sugar and fat is in the food you want to eat  · Drink 8 to 10 glasses of water per day  Water will help you make plenty of milk for your baby  It will also help prevent constipation  Drink a glass of water every time you breastfeed your baby  · Take vitamins as directed  Ask your healthcare provider what vitamins you need  · Limit caffeine and alcohol if you are breastfeeding  Caffeine and alcohol can get into your breast milk  Caffeine and alcohol can make your baby fussy  They can also interfere with your baby's sleep  Ask your healthcare provider if you can drink alcohol or caffeine  Rest and sleep: You may feel very tired in the postpartum period  Enough sleep will help you heal and give you energy to care for your baby  The following may help you get sleep and rest:  · Nap when your baby naps  Your baby may nap several times during the day  Get rest during this time  · Limit visitors  Many people may want to see you and your baby  Ask friends or family to visit on different days  This will give you time to rest      · Do not plan too much for one day  Put off household chores so that you have time to rest  Gradually do more each day  · Ask for help from family, friends, or neighbors    Ask them to help you with laundry, cleaning, or errands  Also ask someone to watch the baby while you take a nap or relax  Ask your partner to help with the care of your baby  Pump some of your breast milk so your partner can feed your baby during the night  Exercise after delivery:  Wait until your healthcare provider says it is okay to exercise  Exercise can help you lose weight, increase your energy levels, and manage your mood  It can also prevent constipation and blood clots  Start with gentle exercises such as walking  Do more as you have more energy  You may need to avoid abdominal exercises for 1 to 2 weeks after you deliver  Talk to your healthcare provider about an exercise plan that is right for you  Sexual activity after delivery:   · Do not have sex until your healthcare provider says it is okay  You may need to wait 4 to 6 weeks before you have sex  This may prevent infection and allow time to heal      · Your menstrual cycle may begin as soon as 3 weeks after you deliver  Your period may be delayed if you breastfeed your baby  You can become pregnant before you get your first postpartum period  Talk to your healthcare provider about birth control that is right for you  Some types of birth control are not safe during breastfeeding  For support and more information:  Join a support group for new mothers  Ask for help from family and friends with chores, errands, and care of your baby  · Office of Women's Health,  Department of Health and Human Services  [address], [id number] [place] [address]  [address], [id number] [place] [address]  Phone: 5- [phone]  Web Address: www womenshealth gov  · [facility] [address] , [address] [place] [address]  [address]  Web Address: [employer] be  org/pregnancy/postpartum-care  aspx  Follow up with your obstetrician or midwife as directed:   You will need to follow up with your healthcare provider in 2 to 6 weeks after delivery  Write down your questions so you remember to ask them at your visits  © 2017 [address] Information is for End User's use only and may not be sold, redistributed or otherwise used for commercial purposes  All illustrations and images included in CareNotes® are the copyrighted property of A D A M , Inc  or Chris Spencer  The above information is an  only  It is not intended as medical advice for individual conditions or treatments  Talk to your doctor, nurse or pharmacist before following any medical regimen to see if it is safe and effective for you

## 2019-11-28 NOTE — LACTATION NOTE
This note was copied from a baby's chart  CONSULT - LACTATION  Baby Boy 1 Cordova (Claudia) 0 days male MRN: [id number]    [facility] Room / Bed: (N)/(N) Encounter: [id number]    Maternal Information     MOTHER:  Carlotta Burdick  Maternal Age: 38 y o    OB History: #: 1A, Date: 19, Sex: Male, Weight: 2432 g (5 lb 5 8 oz), GA: 35w6d, Delivery: , Low Transverse, Apgar1: 9, Apgar5: 9, Living: Living, Birth Comments: None  #: 1B, Date: 19, Sex: Male, Weight: 2610 g (5 lb 12 1 oz), GA: 35w6d, Delivery: , Low Transverse, Apgar1: 6, Apgar5: 8, Living: Living, Birth Comments: None   Previouse breast reduction surgery? No, implants    Lactation history:   Has patient previously breast fed: No   How long had patient previously breast fed:     Previous breast feeding complications:       Past Surgical History:   Procedure Laterality Date    BREAST IMPLANT      FERTILITY SURGERY      NOSE SURGERY         Birth information:  YOB: 2020   Time of birth: 3:56 AM   Sex: male   Delivery type: , Low Transverse   Birth Weight: 2432 g (5 lb 5 8 oz)   Percent of Weight Change: 0%     Gestational Age: 26w5d   [unfilled]    Assessment     Breast and nipple assessment: normal assessment    [place] Assessment: normal assessment    Feeding assessment: feeding well  LATCH:  Latch: Grasps breast, tongue down, lips flanged, rhythmic sucking   Audible Swallowing: Spontaneous and intermittent (24 hours old)   Type of Nipple: Everted (After stimulation)   Comfort (Breast/Nipple): Soft/non-tender   Hold (Positioning): Partial assist, teach one side, mother does other, staff holds   LATCH Score: 9          Feeding recommendations:  breast feed on demand     Met with mother  Provided mother with Ready, Set, Baby booklet  Discussed Skin to Skin contact an benefits to mom and baby    Talked about the delay of the first bath until baby has adjusted  Spoke about the benefits of rooming in  Feeding on cue and what that means for recognizing infant's hunger  Avoidance of pacifiers for the first month discussed  Talked about exclusive breastfeeding for the first 6 months  Positioning and latch reviewed as well as showing images of other feeding positions  Discussed the properties of a good latch in any position  Reviewed hand/manual expression  Discussed s/s that baby is getting enough milk and some s/s that breastfeeding dyad may need further help  Baby latches very well in cross cradle hold at this time, Mom supporting infant independently    Gave information on common concerns, what to expect the first few weeks after delivery, preparing for other caregivers, and how partners can help  Resources for support also provided  Discussed 2nd night syndrome and ways to calm infant  Hand out given  Information on hand expression given  Discussed benefits of knowing how to manually express breast including stimulating milk supply, softening nipple for latch and evacuating breast in the event of engorgement  Instructions given on pumping, recommended due to multiple gestation and separation from twin B in NICU  Discussed when to start, frequency, different pumps available versus manual expression  Discussed hygiene of hands and supplies as well as assembly, placement of flanges, size of flanged, preparing the breast and cycles and suction settings on pump  Demonstrated use of hand pump  Discussed labeling of milk, storage, and preparation of stored milk  Ext provided and enc parents to call for lactation support as needed throughout their stay       Raul Richard RN 11/28/2019 1:45 PM

## 2019-11-29 ENCOUNTER — APPOINTMENT (OUTPATIENT)
Dept: PERINATAL CARE | Facility: CLINIC | Age: 44
End: 2019-11-29
Payer: COMMERCIAL

## 2019-11-29 LAB
ALBUMIN SERPL BCP-MCNC: 1.2 G/DL (ref 3.5–5)
ALP SERPL-CCNC: 445 U/L (ref 46–116)
ALT SERPL W P-5'-P-CCNC: 16 U/L (ref 12–78)
ANION GAP SERPL CALCULATED.3IONS-SCNC: 7 MMOL/L (ref 4–13)
APTT PPP: 28 SECONDS (ref 23–37)
AST SERPL W P-5'-P-CCNC: 33 U/L (ref 5–45)
BASOPHILS # BLD AUTO: 0.03 THOUSANDS/ΜL (ref 0–0.1)
BASOPHILS # BLD AUTO: 0.05 THOUSANDS/ΜL (ref 0–0.1)
BASOPHILS NFR BLD AUTO: 0 % (ref 0–1)
BASOPHILS NFR BLD AUTO: 0 % (ref 0–1)
BILIRUB SERPL-MCNC: 0.13 MG/DL (ref 0.2–1)
BUN SERPL-MCNC: 18 MG/DL (ref 5–25)
CALCIUM SERPL-MCNC: 7.1 MG/DL (ref 8.3–10.1)
CHLORIDE SERPL-SCNC: 103 MMOL/L (ref 100–108)
CO2 SERPL-SCNC: 23 MMOL/L (ref 21–32)
CREAT SERPL-MCNC: 0.94 MG/DL (ref 0.6–1.3)
EOSINOPHIL # BLD AUTO: 0.01 THOUSAND/ΜL (ref 0–0.61)
EOSINOPHIL # BLD AUTO: 0.03 THOUSAND/ΜL (ref 0–0.61)
EOSINOPHIL NFR BLD AUTO: 0 % (ref 0–6)
EOSINOPHIL NFR BLD AUTO: 0 % (ref 0–6)
ERYTHROCYTE [DISTWIDTH] IN BLOOD BY AUTOMATED COUNT: 13.7 % (ref 11.6–15.1)
ERYTHROCYTE [DISTWIDTH] IN BLOOD BY AUTOMATED COUNT: 13.8 % (ref 11.6–15.1)
FIBRINOGEN PPP-MCNC: 372 MG/DL (ref 227–495)
GFR SERPL CREATININE-BSD FRML MDRD: 74 ML/MIN/1.73SQ M
GLUCOSE SERPL-MCNC: 92 MG/DL (ref 65–140)
HCT VFR BLD AUTO: 23.5 % (ref 34.8–46.1)
HCT VFR BLD AUTO: 24.6 % (ref 34.8–46.1)
HGB BLD-MCNC: 7.8 G/DL (ref 11.5–15.4)
HGB BLD-MCNC: 8.2 G/DL (ref 11.5–15.4)
IMM GRANULOCYTES # BLD AUTO: 0.13 THOUSAND/UL (ref 0–0.2)
IMM GRANULOCYTES # BLD AUTO: 0.15 THOUSAND/UL (ref 0–0.2)
IMM GRANULOCYTES NFR BLD AUTO: 1 % (ref 0–2)
IMM GRANULOCYTES NFR BLD AUTO: 1 % (ref 0–2)
INR PPP: 0.91 (ref 0.84–1.19)
LYMPHOCYTES # BLD AUTO: 1.5 THOUSANDS/ΜL (ref 0.6–4.47)
LYMPHOCYTES # BLD AUTO: 1.82 THOUSANDS/ΜL (ref 0.6–4.47)
LYMPHOCYTES NFR BLD AUTO: 12 % (ref 14–44)
LYMPHOCYTES NFR BLD AUTO: 14 % (ref 14–44)
MCH RBC QN AUTO: 30.7 PG (ref 26.8–34.3)
MCH RBC QN AUTO: 30.8 PG (ref 26.8–34.3)
MCHC RBC AUTO-ENTMCNC: 33.2 G/DL (ref 31.4–37.4)
MCHC RBC AUTO-ENTMCNC: 33.3 G/DL (ref 31.4–37.4)
MCV RBC AUTO: 93 FL (ref 82–98)
MCV RBC AUTO: 93 FL (ref 82–98)
MONOCYTES # BLD AUTO: 0.94 THOUSAND/ΜL (ref 0.17–1.22)
MONOCYTES # BLD AUTO: 1.23 THOUSAND/ΜL (ref 0.17–1.22)
MONOCYTES NFR BLD AUTO: 8 % (ref 4–12)
MONOCYTES NFR BLD AUTO: 9 % (ref 4–12)
NEUTROPHILS # BLD AUTO: 10.11 THOUSANDS/ΜL (ref 1.85–7.62)
NEUTROPHILS # BLD AUTO: 9.49 THOUSANDS/ΜL (ref 1.85–7.62)
NEUTS SEG NFR BLD AUTO: 76 % (ref 43–75)
NEUTS SEG NFR BLD AUTO: 79 % (ref 43–75)
NRBC BLD AUTO-RTO: 0 /100 WBCS
NRBC BLD AUTO-RTO: 0 /100 WBCS
PLATELET # BLD AUTO: 156 THOUSANDS/UL (ref 149–390)
PLATELET # BLD AUTO: 167 THOUSANDS/UL (ref 149–390)
PMV BLD AUTO: 11.4 FL (ref 8.9–12.7)
PMV BLD AUTO: 11.8 FL (ref 8.9–12.7)
POTASSIUM SERPL-SCNC: 4.7 MMOL/L (ref 3.5–5.3)
PROT SERPL-MCNC: 3.8 G/DL (ref 6.4–8.2)
PROTHROMBIN TIME: 11.9 SECONDS (ref 11.6–14.5)
RBC # BLD AUTO: 2.54 MILLION/UL (ref 3.81–5.12)
RBC # BLD AUTO: 2.66 MILLION/UL (ref 3.81–5.12)
RPR SER QL: NORMAL
SODIUM SERPL-SCNC: 133 MMOL/L (ref 136–145)
WBC # BLD AUTO: 12.16 THOUSAND/UL (ref 4.31–10.16)
WBC # BLD AUTO: 13.33 THOUSAND/UL (ref 4.31–10.16)

## 2019-11-29 PROCEDURE — 85730 THROMBOPLASTIN TIME PARTIAL: CPT | Performed by: OBSTETRICS & GYNECOLOGY

## 2019-11-29 PROCEDURE — 85025 COMPLETE CBC W/AUTO DIFF WBC: CPT | Performed by: STUDENT IN AN ORGANIZED HEALTH CARE EDUCATION/TRAINING PROGRAM

## 2019-11-29 PROCEDURE — 85025 COMPLETE CBC W/AUTO DIFF WBC: CPT | Performed by: OBSTETRICS & GYNECOLOGY

## 2019-11-29 PROCEDURE — 99024 POSTOP FOLLOW-UP VISIT: CPT | Performed by: OBSTETRICS & GYNECOLOGY

## 2019-11-29 PROCEDURE — 0HQ7XZZ REPAIR ABDOMEN SKIN, EXTERNAL APPROACH: ICD-10-PCS | Performed by: OBSTETRICS & GYNECOLOGY

## 2019-11-29 PROCEDURE — 80053 COMPREHEN METABOLIC PANEL: CPT | Performed by: STUDENT IN AN ORGANIZED HEALTH CARE EDUCATION/TRAINING PROGRAM

## 2019-11-29 PROCEDURE — 0T9B70Z DRAINAGE OF BLADDER WITH DRAINAGE DEVICE, VIA NATURAL OR ARTIFICIAL OPENING: ICD-10-PCS | Performed by: OBSTETRICS & GYNECOLOGY

## 2019-11-29 PROCEDURE — 85384 FIBRINOGEN ACTIVITY: CPT | Performed by: OBSTETRICS & GYNECOLOGY

## 2019-11-29 PROCEDURE — 85610 PROTHROMBIN TIME: CPT | Performed by: OBSTETRICS & GYNECOLOGY

## 2019-11-29 RX ORDER — LIDOCAINE HYDROCHLORIDE 10 MG/ML
5 INJECTION, SOLUTION EPIDURAL; INFILTRATION; INTRACAUDAL; PERINEURAL ONCE
Status: COMPLETED | OUTPATIENT
Start: 2019-11-29 | End: 2019-11-29

## 2019-11-29 RX ORDER — IBUPROFEN 600 MG/1
600 TABLET ORAL EVERY 6 HOURS SCHEDULED
Status: DISCONTINUED | OUTPATIENT
Start: 2019-11-29 | End: 2019-12-03 | Stop reason: HOSPADM

## 2019-11-29 RX ADMIN — SODIUM CHLORIDE, SODIUM LACTATE, POTASSIUM CHLORIDE, AND CALCIUM CHLORIDE 50 ML/HR: .6; .31; .03; .02 INJECTION, SOLUTION INTRAVENOUS at 02:13

## 2019-11-29 RX ADMIN — ACETAMINOPHEN 975 MG: 325 TABLET ORAL at 11:19

## 2019-11-29 RX ADMIN — OXYCODONE HYDROCHLORIDE 5 MG: 5 TABLET ORAL at 15:55

## 2019-11-29 RX ADMIN — HYDROMORPHONE HYDROCHLORIDE 1 MG: 1 INJECTION, SOLUTION INTRAMUSCULAR; INTRAVENOUS; SUBCUTANEOUS at 11:31

## 2019-11-29 RX ADMIN — IBUPROFEN 600 MG: 600 TABLET ORAL at 14:19

## 2019-11-29 RX ADMIN — IBUPROFEN 600 MG: 600 TABLET ORAL at 08:25

## 2019-11-29 RX ADMIN — ACETAMINOPHEN 975 MG: 325 TABLET ORAL at 22:17

## 2019-11-29 RX ADMIN — KETOROLAC TROMETHAMINE 30 MG: 30 INJECTION, SOLUTION INTRAMUSCULAR at 02:06

## 2019-11-29 RX ADMIN — DOCUSATE SODIUM 100 MG: 100 CAPSULE, LIQUID FILLED ORAL at 20:51

## 2019-11-29 RX ADMIN — IBUPROFEN 600 MG: 600 TABLET ORAL at 22:17

## 2019-11-29 RX ADMIN — OXYCODONE HYDROCHLORIDE 10 MG: 10 TABLET ORAL at 20:52

## 2019-11-29 RX ADMIN — OXYCODONE HYDROCHLORIDE 5 MG: 5 TABLET ORAL at 08:30

## 2019-11-29 RX ADMIN — LIDOCAINE HYDROCHLORIDE 5 ML: 10 INJECTION, SOLUTION EPIDURAL; INFILTRATION; INTRACAUDAL; PERINEURAL at 11:42

## 2019-11-29 RX ADMIN — DOCUSATE SODIUM 100 MG: 100 CAPSULE, LIQUID FILLED ORAL at 08:25

## 2019-11-29 NOTE — PROGRESS NOTES
In department Dr Vilinda Lombard and Dr Dannie Rooney were made aware of current status of patient's incision with quarter size clot and saturated pad since dressing change at 1730  I will be checking the pad every two hours with her magnesium check

## 2019-11-29 NOTE — PROGRESS NOTES
Called to bedside as patient continues to have bleeding from incision  Approx 10cc blood expressed from skin separation approximately 4cm right lateral from midline of pfannenstiel  Ecchymosis noted along incision line extending 2cm superiorly and inferiorly  Tenderness at left aspect of incision  Decision was made to place figure of eight suture  Patient was premedicated with dilaudid IV  Area was prepped with betadine solution  Sterile fenestrated drape was placed  4mL of 1% xylocaine was injected locally  3-0 monofilament suture was used for figure-of-eight  Procedure was tolerated well, without complications  Excellent hemostasis was noted  Ordered CBC, coags, fibrinogen    Dr Andi Ponce was present and participated in all key portions of the procedure      Isabella Godoy MD  OB/GYN PGY-4  11/29/2019 12:33 PM

## 2019-11-29 NOTE — SOCIAL WORK
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

## 2019-11-29 NOTE — QUICK NOTE
Presented to patient's bedside for concern of incisional bleeding  Patient reports she is feeling better and is doing well  Pressure dressing has Telfa and ABD pad that is saturated and the small area of clot appears stable  This area was cleansed and steri strips were placed atop the incision  Good hemostasis was noted  A new pressure dressing was placed atop the incision and binder was replaced  Nursing instructed to monitor dressing hourly to observe for saturation  Explained to patient if bleeding continues she may require bed side suture at the area that is bleeding  Will continue to monitor closely       Yudith Sanchez MD  11/29/19

## 2019-11-29 NOTE — PROGRESS NOTES
Progress Note - OB/GYN   Maxine Neighbours 38 y o  female MRN: [id number]  Unit/Bed#: -01 Encounter: [id number]    Assessment:  Post partum Day #1 s/p 1LTCS, stable    Plan:  1  Post partum   - Continue routine post partum care  - Encourage ambulation  - Encourage breastfeeding  - Hgb 13 6 -> 9 5 -> f/u AM CBC  - UOP: 130cc/hr --> f/u void    2  Pre-eclampsia with severe features  - Blood pressures 118-153/69-96  - S/p magnesium  - CMP pending this AM    3  Incisional bleeding  - Pressure dressing overnight with some improvement  - Consider silver nitrate or suture if bleeding continues    4  Discharge planning  - Anticipate discharge POD3        Subjective/Objective   Subjective    Post delivery  Patient is doing well  Lochia WNL  Pain well controlled  Patient has had     Pain: yes,  cramping, improved with meds  Tolerating PO: yes  Voiding: trial to follow  Flatus: no  BM: no  Ambulating: yes  Breastfeeding:  yes  Chest pain: no  Shortness of breath: no  Leg pain: no  Lochia: minimal    Objective:     Vitals: /69 (BP Location: Right arm)   Pulse 74   Temp 98 °F (36 7 °C) (Oral)   Resp 18   LMP 03/22/2019   SpO2 97%   Breastfeeding?  Yes       Intake/Output Summary (Last 24 hours) at 11/29/2019 0613  Last data filed at 11/29/2019 0420  Gross per 24 hour   Intake 500 ml   Output 2825 ml   Net -2325 ml       Lab Results   Component Value Date    WBC 16 29 (H) 11/28/2019    HGB 9 5 (L) 11/28/2019    HCT 28 5 (L) 11/28/2019    MCV 92 11/28/2019     11/28/2019     Lab Results   Component Value Date    SODIUM 135 (L) 11/28/2019    K 4 6 11/28/2019     11/28/2019    CO2 22 11/28/2019    AGAP 7 11/28/2019    BUN 16 11/28/2019    CREATININE 0 87 11/28/2019    GLUC 103 11/28/2019    GLUF 84 06/23/2017    CALCIUM 8 4 11/28/2019    AST 37 11/28/2019    ALT 16 11/28/2019    ALKPHOS 693 (H) 11/28/2019    TP 4 8 (L) 11/28/2019    TBILI 0 31 11/28/2019    EGFR 81 11/28/2019         Physical Exam: Physical Exam  NAD  Breathing comfortably on room air  Abdomen soft, nontender, nondistended  Uterine fundus firm, nontender, at -2 the umbilicus  Incision: no erythema, small amount of oozing seen at the right edge of incision, saturated through several 4x4 in the last 2 hours  WWP, intact distal pulses      Nara Tomlinson MD, PGY-2  11/29/2019  6:49 AM

## 2019-11-29 NOTE — LACTATION NOTE
This note was copied from a baby's chart  Attempted to latch infant at this time  Baby is sleepy, shows some interest but quickly falls asleep at the breast  Finger feeding with SNS demonstrated for parents  Assisted Mom with first pumping session

## 2019-11-29 NOTE — PLAN OF CARE
Problem: POSTPARTUM  Goal: Experiences normal postpartum course  Description  INTERVENTIONS:  - Monitor maternal vital signs  - Assess uterine involution and lochia  Outcome: Progressing  Goal: Appropriate maternal -  bonding  Description  INTERVENTIONS:  - Identify family support  - Assess for appropriate maternal/infant bonding   -Encourage maternal/infant bonding opportunities  - Referral to  or  as needed  Outcome: Progressing  Goal: Establishment of infant feeding pattern  Description  INTERVENTIONS:  - Assess breast/bottle feeding  - Refer to lactation as needed  Outcome: Progressing  Goal: Incision(s), wounds(s) or drain site(s) healing without S/S of infection  Description  INTERVENTIONS  - Assess and document risk factors for skin impairment   - Assess and document dressing, incision, wound bed, drain sites and surrounding tissue  - Consider nutrition services referral as needed  - Oral mucous membranes remain intact  - Provide patient/ family education  Outcome: Progressing     Problem: PAIN - ADULT  Goal: Verbalizes/displays adequate comfort level or baseline comfort level  Description  Interventions:  - Encourage patient to monitor pain and request assistance  - Assess pain using appropriate pain scale  - Administer analgesics based on type and severity of pain and evaluate response  - Implement non-pharmacological measures as appropriate and evaluate response  - Consider cultural and social influences on pain and pain management  - Notify physician/advanced practitioner if interventions unsuccessful or patient reports new pain  Outcome: Progressing     Problem: SAFETY ADULT  Goal: Patient will remain free of falls  Description  INTERVENTIONS:  - Assess patient frequently for physical needs  -  Identify cognitive and physical deficits and behaviors that affect risk of falls    -  [place] fall precautions as indicated by assessment   - Educate patient/family on patient safety including physical limitations  - Instruct patient to call for assistance with activity based on assessment  - Modify environment to reduce risk of injury  - Consider OT/PT consult to assist with strengthening/mobility  Outcome: Progressing  Goal: Maintain or return to baseline ADL function  Description  INTERVENTIONS:  -  Assess patient's ability to carry out ADLs; assess patient's baseline for ADL function and identify physical deficits which impact ability to perform ADLs (bathing, care of mouth/teeth, toileting, grooming, dressing, etc )  - Assess/evaluate cause of self-care deficits   - Assess range of motion  - Assess patient's mobility; develop plan if impaired  - Assess patient's need for assistive devices and provide as appropriate  - Encourage maximum independence but intervene and supervise when necessary  - Involve family in performance of ADLs  - Assess for home care needs following discharge   - Consider OT consult to assist with ADL evaluation and planning for discharge  - Provide patient education as appropriate  Outcome: Progressing  Goal: Maintain or return mobility status to optimal level  Description  INTERVENTIONS:  - Assess patient's baseline mobility status (ambulation, transfers, stairs, etc )    - Identify cognitive and physical deficits and behaviors that affect mobility  - Identify mobility aids required to assist with transfers and/or ambulation (gait belt, sit-to-stand, lift, walker, cane, etc )  - [place] fall precautions as indicated by assessment  - Record patient progress and toleration of activity level on Mobility SBAR; progress patient to next Phase/Stage  - Instruct patient to call for assistance with activity based on assessment  - Consider rehabilitation consult to assist with strengthening/weightbearing, etc   Outcome: Progressing     Problem: DISCHARGE PLANNING  Goal: Discharge to home or other facility with appropriate resources  Description  INTERVENTIONS:  - Identify barriers to discharge w/patient and caregiver  - Arrange for needed discharge resources and transportation as appropriate  - Identify discharge learning needs (meds, wound care, etc )  - Arrange for interpretive services to assist at discharge as needed  - Refer to Case Management Department for coordinating discharge planning if the patient needs post-hospital services based on physician/advanced practitioner order or complex needs related to functional status, cognitive ability, or social support system  Outcome: Progressing

## 2019-11-30 PROCEDURE — 99024 POSTOP FOLLOW-UP VISIT: CPT | Performed by: OBSTETRICS & GYNECOLOGY

## 2019-11-30 RX ORDER — NIFEDIPINE 10 MG/1
10 CAPSULE ORAL ONCE
Status: COMPLETED | OUTPATIENT
Start: 2019-11-30 | End: 2019-11-30

## 2019-11-30 RX ADMIN — ACETAMINOPHEN 975 MG: 325 TABLET ORAL at 16:49

## 2019-11-30 RX ADMIN — DOCUSATE SODIUM 100 MG: 100 CAPSULE, LIQUID FILLED ORAL at 07:29

## 2019-11-30 RX ADMIN — OXYCODONE HYDROCHLORIDE 10 MG: 10 TABLET ORAL at 21:03

## 2019-11-30 RX ADMIN — IBUPROFEN 600 MG: 600 TABLET ORAL at 12:31

## 2019-11-30 RX ADMIN — NIFEDIPINE 10 MG: 10 CAPSULE ORAL at 07:28

## 2019-11-30 RX ADMIN — SIMETHICONE CHEW TAB 80 MG 80 MG: 80 TABLET ORAL at 06:40

## 2019-11-30 RX ADMIN — DOCUSATE SODIUM 100 MG: 100 CAPSULE, LIQUID FILLED ORAL at 16:49

## 2019-11-30 RX ADMIN — OXYCODONE HYDROCHLORIDE 10 MG: 10 TABLET ORAL at 16:48

## 2019-11-30 RX ADMIN — ACETAMINOPHEN 975 MG: 325 TABLET ORAL at 22:04

## 2019-11-30 RX ADMIN — ACETAMINOPHEN 975 MG: 325 TABLET ORAL at 06:00

## 2019-11-30 RX ADMIN — IBUPROFEN 600 MG: 600 TABLET ORAL at 06:00

## 2019-11-30 RX ADMIN — OXYCODONE HYDROCHLORIDE 10 MG: 10 TABLET ORAL at 13:01

## 2019-11-30 RX ADMIN — IBUPROFEN 600 MG: 600 TABLET ORAL at 19:56

## 2019-11-30 NOTE — PLAN OF CARE
Problem: POSTPARTUM  Goal: Experiences normal postpartum course  Description  INTERVENTIONS:  - Monitor maternal vital signs  - Assess uterine involution and lochia  Outcome: Progressing  Goal: Appropriate maternal -  bonding  Description  INTERVENTIONS:  - Identify family support  - Assess for appropriate maternal/infant bonding   -Encourage maternal/infant bonding opportunities  - Referral to  or  as needed  Outcome: Progressing  Goal: Establishment of infant feeding pattern  Description  INTERVENTIONS:  - Assess breast/bottle feeding  - Refer to lactation as needed  Outcome: Progressing  Goal: Incision(s), wounds(s) or drain site(s) healing without S/S of infection  Description  INTERVENTIONS  - Assess and document risk factors for skin impairment   - Assess and document dressing, incision, wound bed, drain sites and surrounding tissue  - Consider nutrition services referral as needed  - Oral mucous membranes remain intact  - Provide patient/ family education  Outcome: Progressing     Problem: PAIN - ADULT  Goal: Verbalizes/displays adequate comfort level or baseline comfort level  Description  Interventions:  - Encourage patient to monitor pain and request assistance  - Assess pain using appropriate pain scale  - Administer analgesics based on type and severity of pain and evaluate response  - Implement non-pharmacological measures as appropriate and evaluate response  - Consider cultural and social influences on pain and pain management  - Notify physician/advanced practitioner if interventions unsuccessful or patient reports new pain  Outcome: Progressing     Problem: SAFETY ADULT  Goal: Patient will remain free of falls  Description  INTERVENTIONS:  - Assess patient frequently for physical needs  -  Identify cognitive and physical deficits and behaviors that affect risk of falls    -  [place] fall precautions as indicated by assessment   - Educate patient/family on patient safety including physical limitations  - Instruct patient to call for assistance with activity based on assessment  - Modify environment to reduce risk of injury  - Consider OT/PT consult to assist with strengthening/mobility  Outcome: Progressing  Goal: Maintain or return to baseline ADL function  Description  INTERVENTIONS:  -  Assess patient's ability to carry out ADLs; assess patient's baseline for ADL function and identify physical deficits which impact ability to perform ADLs (bathing, care of mouth/teeth, toileting, grooming, dressing, etc )  - Assess/evaluate cause of self-care deficits   - Assess range of motion  - Assess patient's mobility; develop plan if impaired  - Assess patient's need for assistive devices and provide as appropriate  - Encourage maximum independence but intervene and supervise when necessary  - Involve family in performance of ADLs  - Assess for home care needs following discharge   - Consider OT consult to assist with ADL evaluation and planning for discharge  - Provide patient education as appropriate  Outcome: Progressing  Goal: Maintain or return mobility status to optimal level  Description  INTERVENTIONS:  - Assess patient's baseline mobility status (ambulation, transfers, stairs, etc )    - Identify cognitive and physical deficits and behaviors that affect mobility  - Identify mobility aids required to assist with transfers and/or ambulation (gait belt, sit-to-stand, lift, walker, cane, etc )  - [place] fall precautions as indicated by assessment  - Record patient progress and toleration of activity level on Mobility SBAR; progress patient to next Phase/Stage  - Instruct patient to call for assistance with activity based on assessment  - Consider rehabilitation consult to assist with strengthening/weightbearing, etc   Outcome: Progressing     Problem: DISCHARGE PLANNING  Goal: Discharge to home or other facility with appropriate resources  Description  INTERVENTIONS:  - Identify barriers to discharge w/patient and caregiver  - Arrange for needed discharge resources and transportation as appropriate  - Identify discharge learning needs (meds, wound care, etc )  - Arrange for interpretive services to assist at discharge as needed  - Refer to Case Management Department for coordinating discharge planning if the patient needs post-hospital services based on physician/advanced practitioner order or complex needs related to functional status, cognitive ability, or social support system  Outcome: Progressing

## 2019-11-30 NOTE — PROGRESS NOTES
Patient woke up with a sore throat and chilling  Vitals were taken  Patient became very anxious when she heard her temperature was 99 9 orally  She was informed that it is not considered a fever until it is 100 4  Scheduled motrin and tylenol were given  Her room is set at 75 degrees F  Residents: Dr Mekhi Akhtar, Dr Anjel Rocha, Dr Nithin Soto, and Dr Tammy King were made aware at 0973  Orders were: Continue to monitor   Remove Martínez and repeat vitals at 0630

## 2019-11-30 NOTE — LACTATION NOTE
Mom states one infant in NICU  Other infant with poor latch so far  Mom is bottle feeding formula  Is also pumping  Reassurances given that is it a process to get infant nursing at breast  Stressed frequency of pumping needed  Encouraged to call for assistance as needed  Encouraged to place infant at breast even if just for a short time at each feeding

## 2019-11-30 NOTE — PLAN OF CARE
Problem: POSTPARTUM  Goal: Experiences normal postpartum course  Description  INTERVENTIONS:  - Monitor maternal vital signs  - Assess uterine involution and lochia  Outcome: Progressing  Goal: Appropriate maternal -  bonding  Description  INTERVENTIONS:  - Identify family support  - Assess for appropriate maternal/infant bonding   -Encourage maternal/infant bonding opportunities  - Referral to  or  as needed  Outcome: Progressing  Goal: Establishment of infant feeding pattern  Description  INTERVENTIONS:  - Assess breast/bottle feeding  - Refer to lactation as needed  Outcome: Progressing  Goal: Incision(s), wounds(s) or drain site(s) healing without S/S of infection  Description  INTERVENTIONS  - Assess and document risk factors for skin impairment   - Assess and document dressing, incision, wound bed, drain sites and surrounding tissue  - Consider nutrition services referral as needed  - Oral mucous membranes remain intact  - Provide patient/ family education  Outcome: Progressing     Problem: PAIN - ADULT  Goal: Verbalizes/displays adequate comfort level or baseline comfort level  Description  Interventions:  - Encourage patient to monitor pain and request assistance  - Assess pain using appropriate pain scale  - Administer analgesics based on type and severity of pain and evaluate response  - Implement non-pharmacological measures as appropriate and evaluate response  - Consider cultural and social influences on pain and pain management  - Notify physician/advanced practitioner if interventions unsuccessful or patient reports new pain  Outcome: Progressing     Problem: SAFETY ADULT  Goal: Patient will remain free of falls  Description  INTERVENTIONS:  - Assess patient frequently for physical needs  -  Identify cognitive and physical deficits and behaviors that affect risk of falls    -  [place] fall precautions as indicated by assessment   - Educate patient/family on patient safety including physical limitations  - Instruct patient to call for assistance with activity based on assessment  - Modify environment to reduce risk of injury  - Consider OT/PT consult to assist with strengthening/mobility  Outcome: Progressing  Goal: Maintain or return to baseline ADL function  Description  INTERVENTIONS:  -  Assess patient's ability to carry out ADLs; assess patient's baseline for ADL function and identify physical deficits which impact ability to perform ADLs (bathing, care of mouth/teeth, toileting, grooming, dressing, etc )  - Assess/evaluate cause of self-care deficits   - Assess range of motion  - Assess patient's mobility; develop plan if impaired  - Assess patient's need for assistive devices and provide as appropriate  - Encourage maximum independence but intervene and supervise when necessary  - Involve family in performance of ADLs  - Assess for home care needs following discharge   - Consider OT consult to assist with ADL evaluation and planning for discharge  - Provide patient education as appropriate  Outcome: Progressing  Goal: Maintain or return mobility status to optimal level  Description  INTERVENTIONS:  - Assess patient's baseline mobility status (ambulation, transfers, stairs, etc )    - Identify cognitive and physical deficits and behaviors that affect mobility  - Identify mobility aids required to assist with transfers and/or ambulation (gait belt, sit-to-stand, lift, walker, cane, etc )  - [place] fall precautions as indicated by assessment  - Record patient progress and toleration of activity level on Mobility SBAR; progress patient to next Phase/Stage  - Instruct patient to call for assistance with activity based on assessment  - Consider rehabilitation consult to assist with strengthening/weightbearing, etc   Outcome: Progressing     Problem: DISCHARGE PLANNING  Goal: Discharge to home or other facility with appropriate resources  Description  INTERVENTIONS:  - Identify barriers to discharge w/patient and caregiver  - Arrange for needed discharge resources and transportation as appropriate  - Identify discharge learning needs (meds, wound care, etc )  - Arrange for interpretive services to assist at discharge as needed  - Refer to Case Management Department for coordinating discharge planning if the patient needs post-hospital services based on physician/advanced practitioner order or complex needs related to functional status, cognitive ability, or social support system  Outcome: Progressing

## 2019-11-30 NOTE — PROGRESS NOTES
Progress Note - OB/GYN   Eliseo Edward 38 y o  female MRN: [id number]  Unit/Bed#: -01 Encounter: [id number]    Assessment:  Post partum Day #2 s/p 1LTCS, stable    Plan:  1  Post partum   - Continue routine post partum care  - Encourage ambulation  - Encourage breastfeeding  - Hgb 13 6 -> 9 5 -> 7 8->8 2  - UOP: brower replaced on 11/29 due to urinary retention-> will remove and encourage voiding     2  Pre-eclampsia with severe features  - Blood pressures 121-136/67-95; had two severe ranges between 6am and 6:45am 169/84, 162/94; Procardia 10mg given  - S/p magnesium      3  Incisional bleeding  - Figure of eight suture was placed  -Coags ordered PT: 11 9; PTT 28, fibrinogen 372    4  Discharge planning  - Anticipate discharge POD4        Subjective/Objective   Subjective    Post delivery  Patient is doing well  Lochia WNL  Pain well controlled  Patient has had     Pain: yes,  cramping, improved with meds  Tolerating PO: yes  Voiding: trial to follow  Flatus: no  BM: no  Ambulating: yes  Breastfeeding:  yes  Chest pain: no  Shortness of breath: no  Leg pain: no  Lochia: minimal    Objective:     Vitals: /94 (BP Location: Left arm)   Pulse 102   Temp 99 3 °F (37 4 °C) (Oral)   Resp 18   LMP 03/22/2019   SpO2 100%   Breastfeeding?  Yes       Intake/Output Summary (Last 24 hours) at 11/30/2019 0701  Last data filed at 11/30/2019 0645  Gross per 24 hour   Intake --   Output 6200 ml   Net -6200 ml       Lab Results   Component Value Date    WBC 12 16 (H) 11/29/2019    HGB 8 2 (L) 11/29/2019    HCT 24 6 (L) 11/29/2019    MCV 93 11/29/2019     11/29/2019     Lab Results   Component Value Date    SODIUM 133 (L) 11/29/2019    K 4 7 11/29/2019     11/29/2019    CO2 23 11/29/2019    AGAP 7 11/29/2019    BUN 18 11/29/2019    CREATININE 0 94 11/29/2019    GLUC 92 11/29/2019    GLUF 84 06/23/2017    CALCIUM 7 1 (L) 11/29/2019    AST 33 11/29/2019    ALT 16 11/29/2019    ALKPHOS 445 (H) 11/29/2019    TP 3 8 (L) 11/29/2019    TBILI 0 13 (L) 11/29/2019    EGFR 74 11/29/2019         Physical Exam:   NAD  Breathing comfortably on room air  Abdomen soft, nontender, nondistended  Uterine fundus firm, nontender, at -2 the umbilicus  Incision: S,D and I   -mild bruising at lower right incision, no change   WWP, intact distal pulses

## 2019-12-01 PROCEDURE — 85007 BL SMEAR W/DIFF WBC COUNT: CPT | Performed by: OBSTETRICS & GYNECOLOGY

## 2019-12-01 PROCEDURE — 99024 POSTOP FOLLOW-UP VISIT: CPT | Performed by: OBSTETRICS & GYNECOLOGY

## 2019-12-01 PROCEDURE — 80053 COMPREHEN METABOLIC PANEL: CPT | Performed by: OBSTETRICS & GYNECOLOGY

## 2019-12-01 PROCEDURE — 85027 COMPLETE CBC AUTOMATED: CPT | Performed by: OBSTETRICS & GYNECOLOGY

## 2019-12-01 RX ORDER — GUAIFENESIN 600 MG
600 TABLET, EXTENDED RELEASE 12 HR ORAL EVERY 12 HOURS PRN
Status: DISCONTINUED | OUTPATIENT
Start: 2019-12-01 | End: 2019-12-03 | Stop reason: HOSPADM

## 2019-12-01 RX ORDER — ECHINACEA PURPUREA EXTRACT 125 MG
1 TABLET ORAL
Status: DISCONTINUED | OUTPATIENT
Start: 2019-12-01 | End: 2019-12-03 | Stop reason: HOSPADM

## 2019-12-01 RX ORDER — NIFEDIPINE 30 MG/1
30 TABLET, EXTENDED RELEASE ORAL DAILY
Status: DISCONTINUED | OUTPATIENT
Start: 2019-12-02 | End: 2019-12-03 | Stop reason: HOSPADM

## 2019-12-01 RX ADMIN — IBUPROFEN 600 MG: 600 TABLET ORAL at 09:32

## 2019-12-01 RX ADMIN — OXYCODONE HYDROCHLORIDE 10 MG: 10 TABLET ORAL at 12:57

## 2019-12-01 RX ADMIN — ACETAMINOPHEN 975 MG: 325 TABLET ORAL at 23:25

## 2019-12-01 RX ADMIN — Medication 1 SPRAY: at 23:26

## 2019-12-01 RX ADMIN — OXYCODONE HYDROCHLORIDE 5 MG: 5 TABLET ORAL at 02:04

## 2019-12-01 RX ADMIN — Medication 1 SPRAY: at 20:15

## 2019-12-01 RX ADMIN — DOCUSATE SODIUM 100 MG: 100 CAPSULE, LIQUID FILLED ORAL at 09:30

## 2019-12-01 RX ADMIN — GUAIFENESIN 600 MG: 600 TABLET, EXTENDED RELEASE ORAL at 20:15

## 2019-12-01 RX ADMIN — IBUPROFEN 600 MG: 600 TABLET ORAL at 23:25

## 2019-12-01 RX ADMIN — DOCUSATE SODIUM 100 MG: 100 CAPSULE, LIQUID FILLED ORAL at 17:34

## 2019-12-01 RX ADMIN — ACETAMINOPHEN 975 MG: 325 TABLET ORAL at 14:05

## 2019-12-01 RX ADMIN — OXYCODONE HYDROCHLORIDE 10 MG: 10 TABLET ORAL at 06:14

## 2019-12-01 RX ADMIN — ACETAMINOPHEN 975 MG: 325 TABLET ORAL at 06:14

## 2019-12-01 RX ADMIN — IBUPROFEN 600 MG: 600 TABLET ORAL at 17:34

## 2019-12-01 RX ADMIN — IBUPROFEN 600 MG: 600 TABLET ORAL at 02:04

## 2019-12-01 NOTE — PLAN OF CARE
Problem: POSTPARTUM  Goal: Experiences normal postpartum course  Description  INTERVENTIONS:  - Monitor maternal vital signs  - Assess uterine involution and lochia  Outcome: Progressing  Goal: Appropriate maternal -  bonding  Description  INTERVENTIONS:  - Identify family support  - Assess for appropriate maternal/infant bonding   -Encourage maternal/infant bonding opportunities  - Referral to  or  as needed  Outcome: Progressing  Goal: Establishment of infant feeding pattern  Description  INTERVENTIONS:  - Assess breast/bottle feeding  - Refer to lactation as needed  Outcome: Progressing  Goal: Incision(s), wounds(s) or drain site(s) healing without S/S of infection  Description  INTERVENTIONS  - Assess and document risk factors for skin impairment   - Assess and document dressing, incision, wound bed, drain sites and surrounding tissue  - Consider nutrition services referral as needed  - Oral mucous membranes remain intact  - Provide patient/ family education  Outcome: Progressing     Problem: PAIN - ADULT  Goal: Verbalizes/displays adequate comfort level or baseline comfort level  Description  Interventions:  - Encourage patient to monitor pain and request assistance  - Assess pain using appropriate pain scale  - Administer analgesics based on type and severity of pain and evaluate response  - Implement non-pharmacological measures as appropriate and evaluate response  - Consider cultural and social influences on pain and pain management  - Notify physician/advanced practitioner if interventions unsuccessful or patient reports new pain  Outcome: Progressing     Problem: SAFETY ADULT  Goal: Patient will remain free of falls  Description  INTERVENTIONS:  - Assess patient frequently for physical needs  -  Identify cognitive and physical deficits and behaviors that affect risk of falls    -  [place] fall precautions as indicated by assessment   - Educate patient/family on patient safety including physical limitations  - Instruct patient to call for assistance with activity based on assessment  - Modify environment to reduce risk of injury  - Consider OT/PT consult to assist with strengthening/mobility  Outcome: Progressing  Goal: Maintain or return to baseline ADL function  Description  INTERVENTIONS:  -  Assess patient's ability to carry out ADLs; assess patient's baseline for ADL function and identify physical deficits which impact ability to perform ADLs (bathing, care of mouth/teeth, toileting, grooming, dressing, etc )  - Assess/evaluate cause of self-care deficits   - Assess range of motion  - Assess patient's mobility; develop plan if impaired  - Assess patient's need for assistive devices and provide as appropriate  - Encourage maximum independence but intervene and supervise when necessary  - Involve family in performance of ADLs  - Assess for home care needs following discharge   - Consider OT consult to assist with ADL evaluation and planning for discharge  - Provide patient education as appropriate  Outcome: Progressing  Goal: Maintain or return mobility status to optimal level  Description  INTERVENTIONS:  - Assess patient's baseline mobility status (ambulation, transfers, stairs, etc )    - Identify cognitive and physical deficits and behaviors that affect mobility  - Identify mobility aids required to assist with transfers and/or ambulation (gait belt, sit-to-stand, lift, walker, cane, etc )  - [place] fall precautions as indicated by assessment  - Record patient progress and toleration of activity level on Mobility SBAR; progress patient to next Phase/Stage  - Instruct patient to call for assistance with activity based on assessment  - Consider rehabilitation consult to assist with strengthening/weightbearing, etc   Outcome: Progressing     Problem: DISCHARGE PLANNING  Goal: Discharge to home or other facility with appropriate resources  Description  INTERVENTIONS:  - Identify barriers to discharge w/patient and caregiver  - Arrange for needed discharge resources and transportation as appropriate  - Identify discharge learning needs (meds, wound care, etc )  - Arrange for interpretive services to assist at discharge as needed  - Refer to Case Management Department for coordinating discharge planning if the patient needs post-hospital services based on physician/advanced practitioner order or complex needs related to functional status, cognitive ability, or social support system  Outcome: Progressing

## 2019-12-01 NOTE — LACTATION NOTE
Mom continues to pump  Not getting much out, Reassurances given  Encouraged combining with breast massage and and expression

## 2019-12-01 NOTE — PROGRESS NOTES
Progress Note - OB/GYN   Janie Ivy 38 y o  female MRN: [id number]  Unit/Bed#: -01 Encounter: [id number]    Assessment:  Post partum Day #3 s/p 1LTCS, stable, 1 baby in NICU    Plan:  1  Pre-eclampsia with severe features  - Blood pressure overnight 135/78; s/p Procardia 10mg 11/30/19  - S/p magnesium and labetalol during labor      3  Incisional bleeding  - Figure of eight suture was placed  -Coags ordered PT: 11 9; PTT 28, fibrinogen 372    4  Discharge planning  - Anticipate discharge POD4        Subjective/Objective   Subjective    Post delivery  Patient is doing well she is concerned about her blood pressure and is requesting to go home on a BP medication  Lochia WNL  Pain well controlled but patient is requesting a weeks worth of q4h percocet so that she won't run into issues at home  Patient also states she has a cough this morning  Pain: yes,  cramping, improved with meds  Tolerating PO: yes  Voiding: trial to follow  Flatus: no  BM: no  Ambulating: yes  Breastfeeding:  yes  Chest pain: no  Shortness of breath: no  Leg pain: no  Lochia: minimal    Objective:     Vitals: /78   Pulse 76   Temp 98 °F (36 7 °C) (Oral)   Resp 18   LMP 03/22/2019   SpO2 99%   Breastfeeding?  No       Intake/Output Summary (Last 24 hours) at 12/1/2019 0450  Last data filed at 11/30/2019 1030  Gross per 24 hour   Intake --   Output 3400 ml   Net -3400 ml       Lab Results   Component Value Date    WBC 12 16 (H) 11/29/2019    HGB 8 2 (L) 11/29/2019    HCT 24 6 (L) 11/29/2019    MCV 93 11/29/2019     11/29/2019     Lab Results   Component Value Date    SODIUM 133 (L) 11/29/2019    K 4 7 11/29/2019     11/29/2019    CO2 23 11/29/2019    AGAP 7 11/29/2019    BUN 18 11/29/2019    CREATININE 0 94 11/29/2019    GLUC 92 11/29/2019    GLUF 84 06/23/2017    CALCIUM 7 1 (L) 11/29/2019    AST 33 11/29/2019    ALT 16 11/29/2019    ALKPHOS 445 (H) 11/29/2019    TP 3 8 (L) 11/29/2019    TBILI 0 13 (L) 11/29/2019    EGFR 74 11/29/2019         Physical Exam:   NAD  Breathing comfortably on room air  Abdomen soft, nontender, nondistended  Uterine fundus firm, nontender, at -2 the umbilicus  Incision: C/D/I  WWP, intact distal pulses      Kimberly Duarte MD  12/01/19

## 2019-12-01 NOTE — PROGRESS NOTES
Pt complaining of feeling sob  Vitals stable and evaluated by dr Cally Mendoza at bed side  Respiratory consult placed for peak flow meter   Pt refused multiple times with encouragement and education provided by nursing

## 2019-12-01 NOTE — PLAN OF CARE
Problem: POSTPARTUM  Goal: Experiences normal postpartum course  Description  INTERVENTIONS:  - Monitor maternal vital signs  - Assess uterine involution and lochia  Outcome: Progressing  Goal: Appropriate maternal -  bonding  Description  INTERVENTIONS:  - Identify family support  - Assess for appropriate maternal/infant bonding   -Encourage maternal/infant bonding opportunities  - Referral to  or  as needed  Outcome: Progressing  Goal: Establishment of infant feeding pattern  Description  INTERVENTIONS:  - Assess breast/bottle feeding  - Refer to lactation as needed  Outcome: Progressing  Goal: Incision(s), wounds(s) or drain site(s) healing without S/S of infection  Description  INTERVENTIONS  - Assess and document risk factors for skin impairment   - Assess and document dressing, incision, wound bed, drain sites and surrounding tissue  - Consider nutrition services referral as needed  - Oral mucous membranes remain intact  - Provide patient/ family education  Outcome: Progressing     Problem: PAIN - ADULT  Goal: Verbalizes/displays adequate comfort level or baseline comfort level  Description  Interventions:  - Encourage patient to monitor pain and request assistance  - Assess pain using appropriate pain scale  - Administer analgesics based on type and severity of pain and evaluate response  - Implement non-pharmacological measures as appropriate and evaluate response  - Consider cultural and social influences on pain and pain management  - Notify physician/advanced practitioner if interventions unsuccessful or patient reports new pain  Outcome: Progressing     Problem: SAFETY ADULT  Goal: Patient will remain free of falls  Description  INTERVENTIONS:  - Assess patient frequently for physical needs  -  Identify cognitive and physical deficits and behaviors that affect risk of falls    -  [place] fall precautions as indicated by assessment   - Educate patient/family on patient safety including physical limitations  - Instruct patient to call for assistance with activity based on assessment  - Modify environment to reduce risk of injury  - Consider OT/PT consult to assist with strengthening/mobility  Outcome: Progressing  Goal: Maintain or return to baseline ADL function  Description  INTERVENTIONS:  -  Assess patient's ability to carry out ADLs; assess patient's baseline for ADL function and identify physical deficits which impact ability to perform ADLs (bathing, care of mouth/teeth, toileting, grooming, dressing, etc )  - Assess/evaluate cause of self-care deficits   - Assess range of motion  - Assess patient's mobility; develop plan if impaired  - Assess patient's need for assistive devices and provide as appropriate  - Encourage maximum independence but intervene and supervise when necessary  - Involve family in performance of ADLs  - Assess for home care needs following discharge   - Consider OT consult to assist with ADL evaluation and planning for discharge  - Provide patient education as appropriate  Outcome: Progressing  Goal: Maintain or return mobility status to optimal level  Description  INTERVENTIONS:  - Assess patient's baseline mobility status (ambulation, transfers, stairs, etc )    - Identify cognitive and physical deficits and behaviors that affect mobility  - Identify mobility aids required to assist with transfers and/or ambulation (gait belt, sit-to-stand, lift, walker, cane, etc )  - [place] fall precautions as indicated by assessment  - Record patient progress and toleration of activity level on Mobility SBAR; progress patient to next Phase/Stage  - Instruct patient to call for assistance with activity based on assessment  - Consider rehabilitation consult to assist with strengthening/weightbearing, etc   Outcome: Progressing     Problem: DISCHARGE PLANNING  Goal: Discharge to home or other facility with appropriate resources  Description  INTERVENTIONS:  - Identify barriers to discharge w/patient and caregiver  - Arrange for needed discharge resources and transportation as appropriate  - Identify discharge learning needs (meds, wound care, etc )  - Arrange for interpretive services to assist at discharge as needed  - Refer to Case Management Department for coordinating discharge planning if the patient needs post-hospital services based on physician/advanced practitioner order or complex needs related to functional status, cognitive ability, or social support system  Outcome: Progressing

## 2019-12-01 NOTE — PROGRESS NOTES
Called the patient remained nursing as patient was requesting to be seen by physician for symptoms of cough  Pt endorses she has productive cough the last two mornings when waking up  Endorses she is not able to breath out through her nose in the mornings but this clears up as the day progresses  She is using the incentive spirometer and able to get to 1000  She denies nausea/vomiting, chest pain, shortness of breath  Denies subjective fever  Pt endorses she has been ambulating, tolerating PO intake  Vitals are stable with pulse now in 100s, was previously in the 120-130s  Lungs are clear on my exam  No chest tenderness  Pt declined using peak flow meter as "I do not think this is an asthma issue "     Reassured patient that physical exam and vitals were reassuring  Will touch base with Dr Camilla Collier  Could benefit from nasal spray

## 2019-12-02 LAB
ALBUMIN SERPL BCP-MCNC: 1.6 G/DL (ref 3.5–5)
ALBUMIN SERPL BCP-MCNC: 2 G/DL (ref 3.5–5)
ALP SERPL-CCNC: 360 U/L (ref 46–116)
ALP SERPL-CCNC: 402 U/L (ref 46–116)
ALT SERPL W P-5'-P-CCNC: 55 U/L (ref 12–78)
ALT SERPL W P-5'-P-CCNC: 63 U/L (ref 12–78)
ANION GAP SERPL CALCULATED.3IONS-SCNC: 2 MMOL/L (ref 4–13)
ANION GAP SERPL CALCULATED.3IONS-SCNC: 4 MMOL/L (ref 4–13)
ANISOCYTOSIS BLD QL SMEAR: PRESENT
AST SERPL W P-5'-P-CCNC: 79 U/L (ref 5–45)
AST SERPL W P-5'-P-CCNC: 97 U/L (ref 5–45)
BASOPHILS # BLD MANUAL: 0.09 THOUSAND/UL (ref 0–0.1)
BASOPHILS NFR MAR MANUAL: 1 % (ref 0–1)
BILIRUB SERPL-MCNC: 0.24 MG/DL (ref 0.2–1)
BILIRUB SERPL-MCNC: 0.27 MG/DL (ref 0.2–1)
BUN SERPL-MCNC: 11 MG/DL (ref 5–25)
BUN SERPL-MCNC: 13 MG/DL (ref 5–25)
CALCIUM SERPL-MCNC: 8.1 MG/DL (ref 8.3–10.1)
CALCIUM SERPL-MCNC: 8.7 MG/DL (ref 8.3–10.1)
CHLORIDE SERPL-SCNC: 107 MMOL/L (ref 100–108)
CHLORIDE SERPL-SCNC: 108 MMOL/L (ref 100–108)
CO2 SERPL-SCNC: 27 MMOL/L (ref 21–32)
CO2 SERPL-SCNC: 31 MMOL/L (ref 21–32)
CREAT SERPL-MCNC: 0.57 MG/DL (ref 0.6–1.3)
CREAT SERPL-MCNC: 0.68 MG/DL (ref 0.6–1.3)
CREAT UR-MCNC: 13.3 MG/DL
EOSINOPHIL # BLD MANUAL: 0.09 THOUSAND/UL (ref 0–0.4)
EOSINOPHIL NFR BLD MANUAL: 1 % (ref 0–6)
ERYTHROCYTE [DISTWIDTH] IN BLOOD BY AUTOMATED COUNT: 14.5 % (ref 11.6–15.1)
ERYTHROCYTE [DISTWIDTH] IN BLOOD BY AUTOMATED COUNT: 14.7 % (ref 11.6–15.1)
GFR SERPL CREATININE-BSD FRML MDRD: 106 ML/MIN/1.73SQ M
GFR SERPL CREATININE-BSD FRML MDRD: 112 ML/MIN/1.73SQ M
GLUCOSE SERPL-MCNC: 72 MG/DL (ref 65–140)
GLUCOSE SERPL-MCNC: 84 MG/DL (ref 65–140)
HCT VFR BLD AUTO: 25.1 % (ref 34.8–46.1)
HCT VFR BLD AUTO: 27.1 % (ref 34.8–46.1)
HGB BLD-MCNC: 7.9 G/DL (ref 11.5–15.4)
HGB BLD-MCNC: 8.7 G/DL (ref 11.5–15.4)
LYMPHOCYTES # BLD AUTO: 0.8 THOUSAND/UL (ref 0.6–4.47)
LYMPHOCYTES # BLD AUTO: 9 % (ref 14–44)
MCH RBC QN AUTO: 30.5 PG (ref 26.8–34.3)
MCH RBC QN AUTO: 31 PG (ref 26.8–34.3)
MCHC RBC AUTO-ENTMCNC: 31.5 G/DL (ref 31.4–37.4)
MCHC RBC AUTO-ENTMCNC: 32.1 G/DL (ref 31.4–37.4)
MCV RBC AUTO: 96 FL (ref 82–98)
MCV RBC AUTO: 97 FL (ref 82–98)
MONOCYTES # BLD AUTO: 0.53 THOUSAND/UL (ref 0–1.22)
MONOCYTES NFR BLD: 6 % (ref 4–12)
NEUTROPHILS # BLD MANUAL: 7.07 THOUSAND/UL (ref 1.85–7.62)
NEUTS SEG NFR BLD AUTO: 80 % (ref 43–75)
NRBC BLD AUTO-RTO: 1 /100 WBC (ref 0–2)
NRBC BLD AUTO-RTO: 2 /100 WBCS
PLATELET # BLD AUTO: 290 THOUSANDS/UL (ref 149–390)
PLATELET # BLD AUTO: 294 THOUSANDS/UL (ref 149–390)
PLATELET BLD QL SMEAR: ADEQUATE
PMV BLD AUTO: 9.2 FL (ref 8.9–12.7)
PMV BLD AUTO: 9.6 FL (ref 8.9–12.7)
POIKILOCYTOSIS BLD QL SMEAR: PRESENT
POLYCHROMASIA BLD QL SMEAR: PRESENT
POTASSIUM SERPL-SCNC: 4.5 MMOL/L (ref 3.5–5.3)
POTASSIUM SERPL-SCNC: 5 MMOL/L (ref 3.5–5.3)
PROT SERPL-MCNC: 4.7 G/DL (ref 6.4–8.2)
PROT SERPL-MCNC: 5.5 G/DL (ref 6.4–8.2)
PROT UR-MCNC: 7 MG/DL
PROT/CREAT UR: 0.53 MG/G{CREAT} (ref 0–0.1)
RBC # BLD AUTO: 2.59 MILLION/UL (ref 3.81–5.12)
RBC # BLD AUTO: 2.81 MILLION/UL (ref 3.81–5.12)
RBC MORPH BLD: PRESENT
SODIUM SERPL-SCNC: 139 MMOL/L (ref 136–145)
SODIUM SERPL-SCNC: 140 MMOL/L (ref 136–145)
VARIANT LYMPHS # BLD AUTO: 3 %
WBC # BLD AUTO: 7.92 THOUSAND/UL (ref 4.31–10.16)
WBC # BLD AUTO: 8.84 THOUSAND/UL (ref 4.31–10.16)

## 2019-12-02 PROCEDURE — 85027 COMPLETE CBC AUTOMATED: CPT | Performed by: OBSTETRICS & GYNECOLOGY

## 2019-12-02 PROCEDURE — 80053 COMPREHEN METABOLIC PANEL: CPT | Performed by: OBSTETRICS & GYNECOLOGY

## 2019-12-02 PROCEDURE — 99024 POSTOP FOLLOW-UP VISIT: CPT | Performed by: OBSTETRICS & GYNECOLOGY

## 2019-12-02 PROCEDURE — 82570 ASSAY OF URINE CREATININE: CPT | Performed by: OBSTETRICS & GYNECOLOGY

## 2019-12-02 PROCEDURE — 84156 ASSAY OF PROTEIN URINE: CPT | Performed by: OBSTETRICS & GYNECOLOGY

## 2019-12-02 RX ORDER — FLUTICASONE PROPIONATE 50 MCG
1 SPRAY, SUSPENSION (ML) NASAL DAILY
Status: DISCONTINUED | OUTPATIENT
Start: 2019-12-02 | End: 2019-12-03 | Stop reason: HOSPADM

## 2019-12-02 RX ORDER — IBUPROFEN 400 MG/1
400 TABLET ORAL EVERY 6 HOURS PRN
Status: DISCONTINUED | OUTPATIENT
Start: 2019-12-02 | End: 2019-12-03 | Stop reason: HOSPADM

## 2019-12-02 RX ORDER — OXYCODONE HYDROCHLORIDE 5 MG/1
5 TABLET ORAL EVERY 6 HOURS PRN
Status: DISCONTINUED | OUTPATIENT
Start: 2019-12-02 | End: 2019-12-03 | Stop reason: HOSPADM

## 2019-12-02 RX ORDER — ACETAMINOPHEN 325 MG/1
975 TABLET ORAL EVERY 6 HOURS PRN
Status: DISCONTINUED | OUTPATIENT
Start: 2019-12-02 | End: 2019-12-03 | Stop reason: HOSPADM

## 2019-12-02 RX ORDER — OXYCODONE HYDROCHLORIDE 5 MG/1
5 TABLET ORAL EVERY 4 HOURS PRN
Qty: 5 TABLET | Refills: 0 | Status: CANCELLED | OUTPATIENT
Start: 2019-12-02

## 2019-12-02 RX ORDER — ALBUTEROL SULFATE 90 UG/1
2 AEROSOL, METERED RESPIRATORY (INHALATION) EVERY 4 HOURS PRN
Status: DISCONTINUED | OUTPATIENT
Start: 2019-12-02 | End: 2019-12-03 | Stop reason: HOSPADM

## 2019-12-02 RX ADMIN — ALBUTEROL SULFATE 2 PUFF: 90 AEROSOL, METERED RESPIRATORY (INHALATION) at 15:48

## 2019-12-02 RX ADMIN — Medication 1 SPRAY: at 00:36

## 2019-12-02 RX ADMIN — OXYCODONE HYDROCHLORIDE 5 MG: 5 TABLET ORAL at 22:46

## 2019-12-02 RX ADMIN — IBUPROFEN 600 MG: 600 TABLET ORAL at 20:36

## 2019-12-02 RX ADMIN — FLUTICASONE PROPIONATE 1 SPRAY: 50 SPRAY, METERED NASAL at 13:33

## 2019-12-02 RX ADMIN — IBUPROFEN 600 MG: 600 TABLET ORAL at 05:50

## 2019-12-02 RX ADMIN — ACETAMINOPHEN 975 MG: 325 TABLET ORAL at 16:58

## 2019-12-02 RX ADMIN — DOCUSATE SODIUM 100 MG: 100 CAPSULE, LIQUID FILLED ORAL at 16:57

## 2019-12-02 RX ADMIN — IBUPROFEN 400 MG: 400 TABLET ORAL at 13:50

## 2019-12-02 RX ADMIN — NIFEDIPINE 30 MG: 30 TABLET, FILM COATED, EXTENDED RELEASE ORAL at 09:24

## 2019-12-02 RX ADMIN — OXYCODONE HYDROCHLORIDE 5 MG: 5 TABLET ORAL at 01:26

## 2019-12-02 RX ADMIN — GUAIFENESIN 600 MG: 600 TABLET, EXTENDED RELEASE ORAL at 22:44

## 2019-12-02 RX ADMIN — ACETAMINOPHEN 975 MG: 325 TABLET ORAL at 05:49

## 2019-12-02 RX ADMIN — DOCUSATE SODIUM 100 MG: 100 CAPSULE, LIQUID FILLED ORAL at 09:22

## 2019-12-02 NOTE — LACTATION NOTE
Met with Scotty Lora for follow up assessment  Mom states she has not pumped very much  Encouraged her to pump every 2-3 hours to provide milk for babies in the NICU and to increase milk supply  Assisted with using own nursing bra while pumping  Mom states even at lowest suction setting with pump that she had some soreness with pumping  Nipple fit clearly down flange, but will continue to assess to monitor for proper fit  Mom was able to pump over 4 mls to take to NICU for babies  Mom was very happy to see how much milk she was able to pump  Mom will not be discharged to day due to uncontrolled blood pressures  Will continue to assess

## 2019-12-02 NOTE — QUICK NOTE
Receive call from nurse in regard of elevated blood pressure 165/98, repeat blood pressure 15 minutes after of 156/98, patient with borderline elevated blood pressures, after discussion with Dr Livier Landeros plan to start Procardia XL 30 mg daily  Will continue close monitoring of blood pressures  Patient is asymptomatic now  Information transmitted to nurse      Eleanor Ormond, MD

## 2019-12-02 NOTE — DISCHARGE SUMMARY
Discharge Summary - OB/GYN   Mitchell Farmer 38 y o  female MRN: [id number]  Unit/Bed#: LD OR 2- Encounter: [id number]      Admission Date: 2019     Discharge Date: 12/3/2019    Admitting Diagnosis:   1  35 week 6 day pregnancy  2  Di/Di Twin Gestation  3  IVF Pregnancy  4  Preeclampsia w/ SF  5  Advanced maternal Age     Discharge Diagnosis:   Same, delivered    Procedures: primary  section, low transverse incision, bilateral salpingectomy    Attending: Meghan Mcdonald MD   Discharge Attending: Dr Michelle Horton Course:   Mitchell Farmer is a 76-year-old  with di/di twins who initially presented to triage with complaint of elevated blood pressures  She was diagnosed with preeclampsia without severe features on 19  On presentation to triage she was noted to have severe range blood pressures requiring treatment with IV antihypertensives  This gave her the diagnosis of preeclampsia with severe features and the decision was made to proceed with delivery as patient is greater than 34 weeks gestation  Both babies were noted to be in vertex presentation, however patient elected to proceed with a primary  section with bilateral tubal ligation  She delivered two viable male neonates on 19 at [phone] and [phone] respectively via primary  section  Baby A weighed 5lbs 6oz and Baby B weighed 5lbs 12 oz  Apgars were 9 (1 min) and 9 (5 min) for Baby A and 6 (1 min) and 8 (5 min) for Baby B   Baby A was transferred to  nursery and Baby B was transferred to NICU  Patient tolerated the procedure well and was transferred to recovery in stable condition  Her post-operative course was complicated by preeclampsia with severe features  She received magnesium, labetalol, and procardia  Her labs were within normal limits  She was started on Procardia 30 XL for management of her blood pressure  Preoperative hemaglobin was 13 6, postoperative was 8 7   Her postoperative pain was well controlled with oral analgesics  On day of discharge, she was ambulating and able to reasonably perform all ADLs  She was voiding and had appropriate bowel function  Pain was well controlled  She was discharged home on post-operative day #5 without complications  Patient was instructed to follow up with her OB as an outpatient and was given appropriate warnings to call provider if she develops signs of infection or uncontrolled pain  Complications: none apparent    Condition at discharge: good     Discharge instructions/Information to patient and family:   See after visit summary for information provided to patient and family  Provisions for Follow-Up Care:  See after visit summary for information related to follow-up care and any pertinent home health orders  Disposition: Home    Planned Readmission: No    Discharge Medications: For a complete list of the patient's medications, please refer to her med rec      Prince Whelan MD, PGY-2  12/3/2019  4:21 PM

## 2019-12-02 NOTE — PLAN OF CARE
Problem: POSTPARTUM  Goal: Experiences normal postpartum course  Description  INTERVENTIONS:  - Monitor maternal vital signs  - Assess uterine involution and lochia  Outcome: Progressing  Goal: Appropriate maternal -  bonding  Description  INTERVENTIONS:  - Identify family support  - Assess for appropriate maternal/infant bonding   -Encourage maternal/infant bonding opportunities  - Referral to  or  as needed  Outcome: Progressing  Goal: Establishment of infant feeding pattern  Description  INTERVENTIONS:  - Assess breast/bottle feeding  - Refer to lactation as needed  Outcome: Progressing  Goal: Incision(s), wounds(s) or drain site(s) healing without S/S of infection  Description  INTERVENTIONS  - Assess and document risk factors for skin impairment   - Assess and document dressing, incision, wound bed, drain sites and surrounding tissue  - Consider nutrition services referral as needed  - Oral mucous membranes remain intact  - Provide patient/ family education  Outcome: Progressing     Problem: PAIN - ADULT  Goal: Verbalizes/displays adequate comfort level or baseline comfort level  Description  Interventions:  - Encourage patient to monitor pain and request assistance  - Assess pain using appropriate pain scale  - Administer analgesics based on type and severity of pain and evaluate response  - Implement non-pharmacological measures as appropriate and evaluate response  - Consider cultural and social influences on pain and pain management  - Notify physician/advanced practitioner if interventions unsuccessful or patient reports new pain  Outcome: Progressing     Problem: SAFETY ADULT  Goal: Patient will remain free of falls  Description  INTERVENTIONS:  - Assess patient frequently for physical needs  -  Identify cognitive and physical deficits and behaviors that affect risk of falls    -  [place] fall precautions as indicated by assessment   - Educate patient/family on patient safety including physical limitations  - Instruct patient to call for assistance with activity based on assessment  - Modify environment to reduce risk of injury  - Consider OT/PT consult to assist with strengthening/mobility  Outcome: Progressing  Goal: Maintain or return to baseline ADL function  Description  INTERVENTIONS:  -  Assess patient's ability to carry out ADLs; assess patient's baseline for ADL function and identify physical deficits which impact ability to perform ADLs (bathing, care of mouth/teeth, toileting, grooming, dressing, etc )  - Assess/evaluate cause of self-care deficits   - Assess range of motion  - Assess patient's mobility; develop plan if impaired  - Assess patient's need for assistive devices and provide as appropriate  - Encourage maximum independence but intervene and supervise when necessary  - Involve family in performance of ADLs  - Assess for home care needs following discharge   - Consider OT consult to assist with ADL evaluation and planning for discharge  - Provide patient education as appropriate  Outcome: Progressing  Goal: Maintain or return mobility status to optimal level  Description  INTERVENTIONS:  - Assess patient's baseline mobility status (ambulation, transfers, stairs, etc )    - Identify cognitive and physical deficits and behaviors that affect mobility  - Identify mobility aids required to assist with transfers and/or ambulation (gait belt, sit-to-stand, lift, walker, cane, etc )  - [place] fall precautions as indicated by assessment  - Record patient progress and toleration of activity level on Mobility SBAR; progress patient to next Phase/Stage  - Instruct patient to call for assistance with activity based on assessment  - Consider rehabilitation consult to assist with strengthening/weightbearing, etc   Outcome: Progressing     Problem: DISCHARGE PLANNING  Goal: Discharge to home or other facility with appropriate resources  Description  INTERVENTIONS:  - Identify barriers to discharge w/patient and caregiver  - Arrange for needed discharge resources and transportation as appropriate  - Identify discharge learning needs (meds, wound care, etc )  - Arrange for interpretive services to assist at discharge as needed  - Refer to Case Management Department for coordinating discharge planning if the patient needs post-hospital services based on physician/advanced practitioner order or complex needs related to functional status, cognitive ability, or social support system  Outcome: Progressing

## 2019-12-02 NOTE — PROGRESS NOTES
Progress Note - OB/GYN   Samuel Rosario [address] y o  female MRN: [id number]  Unit/Bed#:  318-01 Encounter: [id number]    Assessment:  Post partum Day #4 s/p 1LTCS, stable    Plan:  1  Post partum   - Continue routine post partum care  - Encourage ambulation  - Encourage breastfeeding  - Hgb 13 6 -> 9 5 -> 7 8 --> 8 2 --> 8 7    2  Pre-eclampsia with severe features  - Blood pressures 135-160/70-90 over the last 24h  - S/p magnesium  - CMP with slightly elevated LFTs AST/ALT 33/16 --> 97/63 --> will repeat this AM  - Patient asymptomatic at this time  - Procardia 30 XL to start this AM     3  Incisional bleeding  - Resolved    4  Shortness of breath  - Patient able to talk without distress  - Most likely related to congestion  -  Will order flonase and albuterol inhaler at this time  - Mucinex ordered  - Encourage incentive spirometry      5  Discharge planning  - Anticipate discharge POD3        Subjective/Objective   Subjective    Post delivery  Patient is doing well  Lochia WNL  Pain well controlled  Patient reports that she intermittently is unable to take a deep breath and feels "like the air is not moving"  She was resting comfortably on my evaluation and speaking without difficulty  Pain: yes,  cramping, improved with meds  Tolerating PO: yes  Voiding: yes  Flatus: yes  BM: no  Ambulating: yes  Breastfeeding:  yes  Chest pain: no  Shortness of breath: intermittent  Leg pain: no  Lochia: minimal    Objective:     Vitals: /88 (BP Location: Left arm)   Pulse 87   Temp 97 9 °F (36 6 °C) (Oral)   Resp 18   LMP 03/22/2019   SpO2 100%   Breastfeeding?  Yes     No intake or output data in the 24 hours ending 12/02/19 0646    Lab Results   Component Value Date    WBC 8 84 12/01/2019    HGB 8 7 (L) 12/01/2019    HCT 27 1 (L) 12/01/2019    MCV 96 12/01/2019     12/01/2019     Lab Results   Component Value Date    SODIUM 140 12/01/2019    K 5 0 12/01/2019     12/01/2019    CO2 31 12/01/2019 AGAP 2 (L) 12/01/2019    BUN 13 12/01/2019    CREATININE 0 68 12/01/2019    GLUC 84 12/01/2019    GLUF 84 06/23/2017    CALCIUM 8 7 12/01/2019    AST 97 (H) 12/01/2019    ALT 63 12/01/2019    ALKPHOS 402 (H) 12/01/2019    TP 5 5 (L) 12/01/2019    TBILI 0 24 12/01/2019    EGFR 106 12/01/2019         Physical Exam:   Physical Exam  NAD  Breathing comfortably on room air; no wheezes or rales   Abdomen soft, nontender, nondistended  Uterine fundus firm, nontender, incision C/D/I, some bruising noted around incision  WWP, intact distal pulses      Dora Liu MD, PGY-2  12/2/2019  6:46 AM

## 2019-12-03 VITALS
DIASTOLIC BLOOD PRESSURE: 86 MMHG | TEMPERATURE: 98.2 F | HEART RATE: 91 BPM | OXYGEN SATURATION: 100 % | RESPIRATION RATE: 16 BRPM | SYSTOLIC BLOOD PRESSURE: 145 MMHG

## 2019-12-03 PROBLEM — O09.819 TWIN PREGNANCY RESULTING FROM ASSISTED REPRODUCTIVE TECHNOLOGY (ART): Status: RESOLVED | Noted: 2019-05-20 | Resolved: 2019-12-03

## 2019-12-03 PROBLEM — Z3A.35 35 WEEKS GESTATION OF PREGNANCY: Status: RESOLVED | Noted: 2019-06-28 | Resolved: 2019-12-03

## 2019-12-03 PROBLEM — O21.9 NAUSEA AND VOMITING DURING PREGNANCY: Status: RESOLVED | Noted: 2019-05-20 | Resolved: 2019-12-03

## 2019-12-03 PROBLEM — O09.91 SUPERVISION OF HIGH RISK PREGNANCY, ANTEPARTUM, FIRST TRIMESTER: Status: RESOLVED | Noted: 2019-05-20 | Resolved: 2019-12-03

## 2019-12-03 PROBLEM — O09.513 AMA (ADVANCED MATERNAL AGE) PRIMIGRAVIDA 35+, THIRD TRIMESTER: Status: RESOLVED | Noted: 2019-05-20 | Resolved: 2019-12-03

## 2019-12-03 PROBLEM — O09.812 PREGNANCY RESULTING FROM IN VITRO FERTILIZATION, SECOND TRIMESTER: Status: RESOLVED | Noted: 2019-10-05 | Resolved: 2019-12-03

## 2019-12-03 PROBLEM — O14.90 PRE-ECLAMPSIA: Status: ACTIVE | Noted: 2019-12-03

## 2019-12-03 PROBLEM — O30.009 TWIN PREGNANCY RESULTING FROM ASSISTED REPRODUCTIVE TECHNOLOGY (ART): Status: RESOLVED | Noted: 2019-05-20 | Resolved: 2019-12-03

## 2019-12-03 PROCEDURE — 99024 POSTOP FOLLOW-UP VISIT: CPT | Performed by: OBSTETRICS & GYNECOLOGY

## 2019-12-03 RX ORDER — DOCUSATE SODIUM 100 MG/1
100 CAPSULE, LIQUID FILLED ORAL 2 TIMES DAILY
Qty: 10 CAPSULE | Refills: 0 | Status: SHIPPED | OUTPATIENT
Start: 2019-12-03

## 2019-12-03 RX ORDER — POLYETHYLENE GLYCOL 3350 17 G/17G
17 POWDER, FOR SOLUTION ORAL DAILY
Status: DISCONTINUED | OUTPATIENT
Start: 2019-12-03 | End: 2019-12-03 | Stop reason: HOSPADM

## 2019-12-03 RX ORDER — IBUPROFEN 600 MG/1
600 TABLET ORAL EVERY 6 HOURS SCHEDULED
Qty: 30 TABLET | Refills: 0 | Status: SHIPPED | OUTPATIENT
Start: 2019-12-03

## 2019-12-03 RX ORDER — BISACODYL 10 MG
10 SUPPOSITORY, RECTAL RECTAL DAILY
Status: DISCONTINUED | OUTPATIENT
Start: 2019-12-03 | End: 2019-12-03 | Stop reason: HOSPADM

## 2019-12-03 RX ORDER — NIFEDIPINE 30 MG/1
30 TABLET, FILM COATED, EXTENDED RELEASE ORAL DAILY
Qty: 30 TABLET | Refills: 2 | Status: SHIPPED | OUTPATIENT
Start: 2019-12-03

## 2019-12-03 RX ORDER — OXYCODONE HYDROCHLORIDE 5 MG/1
5 TABLET ORAL EVERY 6 HOURS PRN
Qty: 12 TABLET | Refills: 0 | Status: SHIPPED | OUTPATIENT
Start: 2019-12-03 | End: 2019-12-13

## 2019-12-03 RX ADMIN — ACETAMINOPHEN 975 MG: 325 TABLET ORAL at 08:27

## 2019-12-03 RX ADMIN — IBUPROFEN 400 MG: 400 TABLET ORAL at 08:28

## 2019-12-03 RX ADMIN — DOCUSATE SODIUM 100 MG: 100 CAPSULE, LIQUID FILLED ORAL at 08:27

## 2019-12-03 RX ADMIN — Medication 10 MG: at 15:15

## 2019-12-03 RX ADMIN — FLUTICASONE PROPIONATE 1 SPRAY: 50 SPRAY, METERED NASAL at 10:23

## 2019-12-03 RX ADMIN — POLYETHYLENE GLYCOL 3350 17 G: 17 POWDER, FOR SOLUTION ORAL at 10:23

## 2019-12-03 RX ADMIN — NIFEDIPINE 30 MG: 30 TABLET, FILM COATED, EXTENDED RELEASE ORAL at 08:27

## 2019-12-03 NOTE — PLAN OF CARE
Problem: POSTPARTUM  Goal: Experiences normal postpartum course  Description  INTERVENTIONS:  - Monitor maternal vital signs  - Assess uterine involution and lochia  Outcome: Progressing  Goal: Appropriate maternal -  bonding  Description  INTERVENTIONS:  - Identify family support  - Assess for appropriate maternal/infant bonding   -Encourage maternal/infant bonding opportunities  - Referral to  or  as needed  Outcome: Progressing  Goal: Establishment of infant feeding pattern  Description  INTERVENTIONS:  - Assess breast/bottle feeding  - Refer to lactation as needed  Outcome: Progressing  Goal: Incision(s), wounds(s) or drain site(s) healing without S/S of infection  Description  INTERVENTIONS  - Assess and document risk factors for skin impairment   - Assess and document dressing, incision, wound bed, drain sites and surrounding tissue  - Consider nutrition services referral as needed  - Oral mucous membranes remain intact  - Provide patient/ family education  Outcome: Progressing     Problem: PAIN - ADULT  Goal: Verbalizes/displays adequate comfort level or baseline comfort level  Description  Interventions:  - Encourage patient to monitor pain and request assistance  - Assess pain using appropriate pain scale  - Administer analgesics based on type and severity of pain and evaluate response  - Implement non-pharmacological measures as appropriate and evaluate response  - Consider cultural and social influences on pain and pain management  - Notify physician/advanced practitioner if interventions unsuccessful or patient reports new pain  Outcome: Progressing     Problem: SAFETY ADULT  Goal: Patient will remain free of falls  Description  INTERVENTIONS:  - Assess patient frequently for physical needs  -  Identify cognitive and physical deficits and behaviors that affect risk of falls    -  [place] fall precautions as indicated by assessment   - Educate patient/family on patient safety including physical limitations  - Instruct patient to call for assistance with activity based on assessment  - Modify environment to reduce risk of injury  - Consider OT/PT consult to assist with strengthening/mobility  Outcome: Progressing  Goal: Maintain or return to baseline ADL function  Description  INTERVENTIONS:  -  Assess patient's ability to carry out ADLs; assess patient's baseline for ADL function and identify physical deficits which impact ability to perform ADLs (bathing, care of mouth/teeth, toileting, grooming, dressing, etc )  - Assess/evaluate cause of self-care deficits   - Assess range of motion  - Assess patient's mobility; develop plan if impaired  - Assess patient's need for assistive devices and provide as appropriate  - Encourage maximum independence but intervene and supervise when necessary  - Involve family in performance of ADLs  - Assess for home care needs following discharge   - Consider OT consult to assist with ADL evaluation and planning for discharge  - Provide patient education as appropriate  Outcome: Progressing  Goal: Maintain or return mobility status to optimal level  Description  INTERVENTIONS:  - Assess patient's baseline mobility status (ambulation, transfers, stairs, etc )    - Identify cognitive and physical deficits and behaviors that affect mobility  - Identify mobility aids required to assist with transfers and/or ambulation (gait belt, sit-to-stand, lift, walker, cane, etc )  - [place] fall precautions as indicated by assessment  - Record patient progress and toleration of activity level on Mobility SBAR; progress patient to next Phase/Stage  - Instruct patient to call for assistance with activity based on assessment  - Consider rehabilitation consult to assist with strengthening/weightbearing, etc   Outcome: Progressing     Problem: DISCHARGE PLANNING  Goal: Discharge to home or other facility with appropriate resources  Description  INTERVENTIONS:  - Identify barriers to discharge w/patient and caregiver  - Arrange for needed discharge resources and transportation as appropriate  - Identify discharge learning needs (meds, wound care, etc )  - Arrange for interpretive services to assist at discharge as needed  - Refer to Case Management Department for coordinating discharge planning if the patient needs post-hospital services based on physician/advanced practitioner order or complex needs related to functional status, cognitive ability, or social support system  Outcome: Progressing

## 2019-12-03 NOTE — UTILIZATION REVIEW
Initial Clinical Review    Admission: Date/Time/Statement: Inpatient Admission Orders (From admission, onward)     Ordered        19 0035  Inpatient Admission  Once                   Orders Placed This Encounter   Procedures    Inpatient Admission     Standing Status:   Standing     Number of Occurrences:   1     Order Specific Question:   Admitting Physician     Answer:   Karrie Bustillos [[id number]]     Order Specific Question:   Level of Care     Answer:   Med Surg [[id number]]     Order Specific Question:   Estimated length of stay     Answer:   More than 2 Midnights     Order Specific Question:   Certification     Answer:   I certify that inpatient services are medically necessary for this patient for a duration of greater than two midnights  See H&P and MD Progress Notes for additional information about the patient's course of treatment  ED Arrival Information     Expected Arrival Acuity Means of Arrival Escorted By Service Admission Type    2019 22:58 - Robert Gomez Spouse OB/GYN Elective    Arrival Complaint    hypertension/36 wks preg        Chief Complaint   Patient presents with    Pregnancy Problem     Patient states that she thinks her BP is high; last reading was 160s/102 about 30 mins ago; told by her Dr  to come in if it is      Assessment/Plan: 16-year-old  with di/di twins who initially presented to triage with complaint of elevated blood pressures  She was diagnosed with preeclampsia without severe features on 19  On presentation to triage she was noted to have severe range blood pressures requiring treatment with IV antihypertensives  This gave her the diagnosis of preeclampsia with severe features and the decision was made to proceed with delivery as patient is greater than 34 weeks gestation  patient elected to proceed with a primary  section with bilateral tubal ligation  Her post-operative course was complicated by preeclampsia with severe features   She received magnesium, labetalol, and procardia  Her labs were within normal limits  She was started on Procardia XL for management of her blood pressure  Preoperative hemaglobin was 13 6, postoperative was 8 7  Day #4 PP patient began with a new start of Procardia for some elevated BPs ( 12/01BP 1250=914/95; 12/02= 160/94) & required monitoring for effect  Admitting Diagnosis:   1  35 week 6 day pregnancy  2  Di/Di Twin Gestation  3  IVF Pregnancy  4  Preeclampsia w/ SF  5   Advanced maternal Age     Triage Vitals   Temperature Pulse Respirations Blood Pressure SpO2   11/27/19 2303 11/27/19 2303 11/27/19 2305 11/27/19 2303 11/27/19 2303   98 3 °F (36 8 °C) 94 20 (!) 154/104 98 %      Temp Source Heart Rate Source Patient Position - Orthostatic VS BP Location FiO2 (%)   11/27/19 2303 11/28/19 0450 11/28/19 0450 11/28/19 0450 --   Oral Monitor Sitting Right arm       Pain Score       11/28/19 0450       No Pain        Wt Readings from Last 1 Encounters:   11/26/19 74 1 kg (163 lb 6 4 oz)     Additional Vital Signs:   12/03/19 1310  98 2 °F (36 8 °C)  91  16  145/86  --  --  --  --   12/03/19 0801  98 4 °F (36 9 °C)  90  20  137/89  --  --  --  --   12/03/19 0500  --  86  16  145/91   --  --  --  --   BP: nurse Mitzi Mello notified at 12/03/19 0500   12/03/19 0017  98 2 °F (36 8 °C)  91  18  150/93   --  --  --  --   BP: nurse Mitzi Mello notified at 12/03/19 0017   12/02/19 2036  --  --  --  --  --  None (Room air)  WDL  --   12/02/19 1958  --  98  18  142/86  --  None (Room air)  --  --   12/02/19 1654  99 1 °F (37 3 °C)  109Abnormal   18  133/91  --  None (Room air)  --  Sitting   12/02/19 1344  --  107Abnormal   18  148/95  100 %  None (Room air)  --  Lying   12/02/19 1328  98 2 °F (36 8 °C)  --  --  --  --  --  --  --   12/02/19 0750  98 5 °F (36 9 °C)  87  18  129/79  98 %  None (Room air)  --  Lying   12/02/19 0126  --  87  --  138/88  --  --  --  Lying   12/02/19 0100  97 9 °F (36 6 °C)  100  18  160/94  --  --  --  Sitting 12/01/19 2045  --  112Abnormal   --  156/95  --  --  --  Sitting   12/01/19 2030  --  97  --  165/96   --  None (Room air)  WDL  Sitting   BP: Pt states that her vision is blurred   at 12/01/19 2030 12/01/19 1554  98 2 °F (36 8 °C)  105  20  155/89   --  --  --  Sitting   BP: nurse aware at 12/01/19 1554   12/01/19 0932  --  130Abnormal   22  --  100 %  None (Room air)  WDL  --   12/01/19 0905  98 5 °F (36 9 °C)  121Abnormal    20  148/94  --  --  --  Sitting   Pulse: RN notified at 12/01/19 0905   12/01/19 0019  98 °F (36 7 °C)  76  18  135/78  --             Pertinent Labs/Diagnostic Test Results:   Results from last 7 days   Lab Units 12/02/19  0756 12/01/19  2323 11/29/19  1156 11/29/19  0603 11/28/19  1943   WBC Thousand/uL 7 92 8 84 12 16* 13 33* 16 29*   HEMOGLOBIN g/dL 7 9* 8 7* 8 2* 7 8* 9 5*   HEMATOCRIT % 25 1* 27 1* 24 6* 23 5* 28 5*   PLATELETS Thousands/uL 290 294 167 156 168   NEUTROS ABS Thousands/µL  --   --  9 49* 10 11* 14 69*         Results from last 7 days   Lab Units 12/02/19  0756 12/01/19  2323 11/29/19  0603 11/28/19  1943 11/28/19  1204 11/28/19  0007   SODIUM mmol/L 139 140 133*  --  135* 136   POTASSIUM mmol/L 4 5 5 0 4 7  --  4 6 4 7   CHLORIDE mmol/L 108 107 103  --  106 108   CO2 mmol/L 27 31 23  --  22 19*   ANION GAP mmol/L 4 2* 7  --  7 9   BUN mg/dL 11 13 18  --  16 15   CREATININE mg/dL 0 57* 0 68 0 94  --  0 87 0 72   EGFR ml/min/1 73sq m 112 106 74  --  81 101   CALCIUM mg/dL 8 1* 8 7 7 1*  --  8 4 9 5   MAGNESIUM mg/dL  --   --   --  8 2*  --   --      Results from last 7 days   Lab Units 12/02/19  0756 12/01/19 2323 11/29/19  0603 11/28/19  1204 11/28/19  0007   AST U/L 79* 97* 33 37 27   ALT U/L 55 63 16 16 14   ALK PHOS U/L 360* 402* 445* 693* 958*   TOTAL PROTEIN g/dL 4 7* 5 5* 3 8* 4 8* 6 4   ALBUMIN g/dL 1 6* 2 0* 1 2* 1 6* 2 4*   TOTAL BILIRUBIN mg/dL 0 27 0 24 0 13* 0 31 0 27         Results from last 7 days   Lab Units 12/02/19 0756 12/01/19 2323 11/29/19  5529 11/28/19  1204 11/28/19  0007 11/26/19  1551   GLUCOSE RANDOM mg/dL 72 84 92 103 74 70       Results from last 7 days   Lab Units 11/29/19  1156   PROTIME seconds 11 9   INR  0 91   PTT seconds 28     Results from last 7 days   Lab Units 11/28/19  0023   TSH 3RD GENERATON uIU/mL 9 610*             Results from last 7 days   Lab Units 12/02/19  0034 11/26/19  1551   CREATININE UR mg/dL 13 3 25 4   PROTEIN UR mg/dL 7 53   PROT/CREAT RATIO UR  0 53* 2 09*         Treatment:   Medication Administration from 11/27/2019 2258 to 12/03/2019 1449       Date/Time Order Dose Route Action     11/28/2019 0027 Labetalol HCl (NORMODYNE) injection 20 mg 20 mg Intravenous Given     11/28/2019 0339 lactated ringers infusion   Intravenous Continue from Pre-op     11/28/2019 0250 lactated ringers infusion 0 mL/hr Intravenous Stopped     11/28/2019 0125 lactated ringers infusion 125 mL/hr Intravenous New Bag     11/28/2019 2216 magnesium sulfate 20 g/500 mL infusion (premix) 1 g/hr Intravenous Rate/Dose Change     11/28/2019 2201 magnesium sulfate 20 g/500 mL infusion (premix) 1 g/hr Intravenous Rate/Dose Change     11/28/2019 2109 magnesium sulfate 20 g/500 mL infusion (premix) 2 g/hr Intravenous New Bag     11/28/2019 1109 magnesium sulfate 20 g/500 mL infusion (premix) 2 g/hr Intravenous New Bag     11/28/2019 0338 magnesium sulfate 20 g/500 mL infusion (premix) 2 g/hr Intravenous Continue from Pre-op     11/28/2019 0125 magnesium sulfate 20 g/500 mL infusion (premix) 2 g/hr Intravenous New Bag     11/28/2019 1629 ondansetron (ZOFRAN) injection 4 mg 4 mg Intravenous Given     11/28/2019 0723 ondansetron (ZOFRAN) injection 4 mg 4 mg Intravenous Given     11/28/2019 1010 dexamethasone (PF) (DECADRON) injection 8 mg 8 mg Intravenous Given     11/29/2019 0206 ketorolac (TORADOL) injection 30 mg 30 mg Intravenous Given     11/29/2019 0050 ketorolac (TORADOL) injection 30 mg 30 mg Intravenous Not Given     11/28/2019 1615 ketorolac (TORADOL) injection 30 mg 30 mg Intravenous Given     11/28/2019 1010 ketorolac (TORADOL) injection 30 mg 30 mg Intravenous Given     11/28/2019 0552 oxytocin (PITOCIN) 30 Units in lactated ringers 500 mL infusion 0 jersey-units/min  Stopped     11/28/2019 0417 oxytocin (PITOCIN) 30 Units in lactated ringers 500 mL infusion 200 jersey-units/min Intravenous Rate/Dose Change     11/28/2019 0358 oxytocin (PITOCIN) 30 Units in lactated ringers 500 mL infusion 300 jersey-units/min Intravenous New Bag     11/29/2019 0213 lactated ringers infusion 50 mL/hr Intravenous New Bag     11/29/2019 0210 lactated ringers infusion 0 mL/hr Intravenous Stopped     11/28/2019 1640 lactated ringers infusion 50 mL/hr Intravenous Rate/Dose Change     11/28/2019 0531 lactated ringers infusion 25 mL/hr Intravenous New Bag     11/29/2019 1131 HYDROmorphone (DILAUDID) injection 1 mg 1 mg Intravenous Given     12/03/2019 0827 docusate sodium (COLACE) capsule 100 mg 100 mg Oral Given     12/02/2019 1742 docusate sodium (COLACE) capsule 100 mg 100 mg Oral Not Given     12/02/2019 1657 docusate sodium (COLACE) capsule 100 mg 100 mg Oral Given     12/02/2019 0922 docusate sodium (COLACE) capsule 100 mg 100 mg Oral Given     12/01/2019 1734 docusate sodium (COLACE) capsule 100 mg 100 mg Oral Given     12/01/2019 0930 docusate sodium (COLACE) capsule 100 mg 100 mg Oral Given     11/30/2019 1649 docusate sodium (COLACE) capsule 100 mg 100 mg Oral Given     11/30/2019 0729 docusate sodium (COLACE) capsule 100 mg 100 mg Oral Given     11/29/2019 2051 docusate sodium (COLACE) capsule 100 mg 100 mg Oral Given     11/29/2019 0825 docusate sodium (COLACE) capsule 100 mg 100 mg Oral Given     11/30/2019 6773 simethicone (MYLICON) chewable tablet 80 mg 80 mg Oral Given     11/29/2019 1200 ibuprofen (MOTRIN) tablet 600 mg   Oral Canceled Entry     11/29/2019 0825 ibuprofen (MOTRIN) tablet 600 mg 600 mg Oral Given     11/28/2019 0552 oxytocin (PITOCIN) 30 Units in lactated ringers 500 mL infusion 62 5 jersey-units/min Intravenous New Bag     12/02/2019 0549 acetaminophen (TYLENOL) tablet 975 mg 975 mg Oral Given     12/01/2019 2325 acetaminophen (TYLENOL) tablet 975 mg 975 mg Oral Given     12/01/2019 1405 acetaminophen (TYLENOL) tablet 975 mg 975 mg Oral Given     12/01/2019 0614 acetaminophen (TYLENOL) tablet 975 mg 975 mg Oral Given     11/30/2019 2204 acetaminophen (TYLENOL) tablet 975 mg 975 mg Oral Given     11/30/2019 1649 acetaminophen (TYLENOL) tablet 975 mg 975 mg Oral Given     11/30/2019 0600 acetaminophen (TYLENOL) tablet 975 mg 975 mg Oral Given     11/29/2019 2217 acetaminophen (TYLENOL) tablet 975 mg 975 mg Oral Given     11/29/2019 1418 acetaminophen (TYLENOL) tablet 975 mg 975 mg Oral Not Given     11/29/2019 1119 acetaminophen (TYLENOL) tablet 975 mg 975 mg Oral Given     12/01/2019 1257 oxyCODONE (ROXICODONE) immediate release tablet 10 mg 10 mg Oral Given     12/01/2019 0888 oxyCODONE (ROXICODONE) immediate release tablet 10 mg 10 mg Oral Given     11/30/2019 2103 oxyCODONE (ROXICODONE) immediate release tablet 10 mg 10 mg Oral Given     11/30/2019 1648 oxyCODONE (ROXICODONE) immediate release tablet 10 mg 10 mg Oral Given     11/30/2019 1301 oxyCODONE (ROXICODONE) immediate release tablet 10 mg 10 mg Oral Given     11/29/2019 2052 oxyCODONE (ROXICODONE) immediate release tablet 10 mg 10 mg Oral Given     12/02/2019 0126 oxyCODONE (ROXICODONE) IR tablet 5 mg 5 mg Oral Given     12/01/2019 0204 oxyCODONE (ROXICODONE) IR tablet 5 mg 5 mg Oral Given     11/29/2019 1555 oxyCODONE (ROXICODONE) IR tablet 5 mg 5 mg Oral Given     11/29/2019 0830 oxyCODONE (ROXICODONE) IR tablet 5 mg 5 mg Oral Given     11/28/2019 2203 enoxaparin (LOVENOX) subcutaneous injection 40 mg 40 mg Subcutaneous Given     12/02/2019 2036 ibuprofen (MOTRIN) tablet 600 mg 600 mg Oral Given     12/02/2019 0550 ibuprofen (MOTRIN) tablet 600 mg 600 mg Oral Given     12/01/2019 1128 ibuprofen (MOTRIN) tablet 600 mg 600 mg Oral Given     12/01/2019 1734 ibuprofen (MOTRIN) tablet 600 mg 600 mg Oral Given     12/01/2019 0932 ibuprofen (MOTRIN) tablet 600 mg 600 mg Oral Given     12/01/2019 0204 ibuprofen (MOTRIN) tablet 600 mg 600 mg Oral Given     11/30/2019 1956 ibuprofen (MOTRIN) tablet 600 mg 600 mg Oral Given     11/30/2019 1231 ibuprofen (MOTRIN) tablet 600 mg 600 mg Oral Given     11/30/2019 0600 ibuprofen (MOTRIN) tablet 600 mg 600 mg Oral Given     11/29/2019 2217 ibuprofen (MOTRIN) tablet 600 mg 600 mg Oral Given     11/29/2019 1419 ibuprofen (MOTRIN) tablet 600 mg 600 mg Oral Given     11/29/2019 1142 lidocaine (PF) (XYLOCAINE-MPF) 1 % injection 5 mL 5 mL Infiltration Given     11/30/2019 0728 NIFEdipine (PROCARDIA) capsule 10 mg 10 mg Oral Given     12/02/2019 0036 sodium chloride (OCEAN) 0 65 % nasal spray 1 spray 1 spray Each Nare Given     12/01/2019 2326 sodium chloride (OCEAN) 0 65 % nasal spray 1 spray 1 spray Each Nare Given     12/01/2019 2015 sodium chloride (OCEAN) 0 65 % nasal spray 1 spray 1 spray Each Nare Given     12/02/2019 2244 guaiFENesin (MUCINEX) 12 hr tablet 600 mg 600 mg Oral Given     12/01/2019 2015 guaiFENesin (MUCINEX) 12 hr tablet 600 mg 600 mg Oral Given     12/03/2019 0827 NIFEdipine (PROCARDIA XL) 24 hr tablet 30 mg 30 mg Oral Given     12/02/2019 0924 NIFEdipine (PROCARDIA XL) 24 hr tablet 30 mg 30 mg Oral Given     12/02/2019 1548 albuterol (PROVENTIL HFA,VENTOLIN HFA) inhaler 2 puff 2 puff Inhalation Given     12/03/2019 1023 fluticasone (FLONASE) 50 mcg/act nasal spray 1 spray 1 spray Each Nare Given     12/02/2019 1333 fluticasone (FLONASE) 50 mcg/act nasal spray 1 spray 1 spray Each Nare Given     12/03/2019 0827 acetaminophen (TYLENOL) tablet 975 mg 975 mg Oral Given     12/02/2019 2241 acetaminophen (TYLENOL) tablet 975 mg 975 mg Oral Not Given     12/02/2019 1658 acetaminophen (TYLENOL) tablet 975 mg 975 mg Oral Given     12/02/2019 1474 oxyCODONE (ROXICODONE) IR tablet 5 mg 5 mg Oral Given     12/03/2019 0828 ibuprofen (MOTRIN) tablet 400 mg 400 mg Oral Given     12/02/2019 1350 ibuprofen (MOTRIN) tablet 400 mg 400 mg Oral Given     12/03/2019 1023 polyethylene glycol (MIRALAX) packet 17 g 17 g Oral Given        Past Medical History:   Diagnosis Date    AMA (advanced maternal age) multigravida 33+     Asthma     childhood    Disease of thyroid gland     Female infertility     Varicella     vac     Present on Admission:  [facility] Dichorionic diamniotic twin pregnancy in third trimester    Admitting Diagnosis: 35 weeks gestation of pregnancy [Z3A 35]  Age/Sex: 38 y o  female  Admission Orders:  Spot pulse oximetry  Up OOB  Peak flows after resp treeatments      Scheduled Medications:  Medications:  docusate sodium 100 mg Oral BID   fluticasone 1 spray Each Nare Daily   ibuprofen 600 mg Oral Q6H [address]   NIFEdipine 30 mg Oral Daily   polyethylene glycol 17 g Oral Daily     Continuous IV Infusions:    lactated ringers 50 mL/hr Intravenous Continuous   Lactated ringers   lactated ringers infusion   Rate: 125 mL/hr Dose: 125 mL/hr  Freq: Continuous Route: IV  Indications of Use: IV Hydration  Last Dose: Stopped (11/28/19 0529)  Start: 11/28/19 0045 End: 11/28/19 0519  PRN Meds:    acetaminophen 975 mg Oral Q6H PRN   albuterol 2 puff Inhalation Q4H PRN   benzocaine-menthol-lanolin-aloe 1 application Topical B7H PRN   calcium carbonate 1,000 mg Oral Daily PRN   diphenhydrAMINE 25 mg Oral Q6H PRN   fentaNYL 25 mcg Intravenous Q3 min PRN   guaiFENesin 600 mg Oral Q12H PRN   hydrocortisone 1 application Topical 4x Daily PRN   ibuprofen 400 mg Oral Q6H PRN   ondansetron 4 mg Intravenous Q8H PRN   oxyCODONE 5 mg Oral Q6H PRN   simethicone 80 mg Oral 4x Daily PRN   sodium chloride 1 spray Each Nare Q1H PRN   witch hazel-glycerin 1 pad Topical Q4H PRN           Network Utilization Review Department  [email or web address]  ATTENTION: Please call with any questions or concerns to [phone] and carefully listen to the prompts so that you are directed to the right person  All voicemails are confidential   Chrystine Can all requests for admission clinical reviews, approved or denied determinations and any other requests to dedicated fax number below belonging to the campus where the patient is receiving treatment   List of dedicated fax numbers for the Facilities:  [address] DENIALS (Administrative/Medical Necessity) [phone]   [address] (Maternity/NICU/Pediatrics) [phone]   [facility] [phone]   [facility] [phone]   Wilfrido Kaufman [phone]   145 [facility] 1525 [facility] [phone]   Dominguez Hal [phone]   [place]  [address] 1000 [facility] [phone]

## 2019-12-03 NOTE — UTILIZATION REVIEW
Notification of Inpatient Admission/Inpatient Authorization Request - TRIED TO SEND VIA PORTAL AND IT WOULD CONNECT TO Skyla Morales   This is a Notification of Inpatient Admission for [address]  Be advised that this patient was admitted to our facility under Inpatient Status  Contact Caren Serrato at [phone] for additional admission information  [facility] DEPT DEDICATED Donna Pierre [phone]  Patient Name:   Nataliya Nelson   YOB: 1975       [address]   [address]:   [facility]  Tax ID: [id number]  NPI: [id number] Attending Provider/NPI: Eun Brown [[id number]]   Place of Service Code: 24     Place of Service Name:  [address] [place] [address]   Start Date: 11/28/19 4991     Discharge Date & Time: No discharge date for patient encounter  Type of Admission: Inpatient Status Discharge Disposition (if discharged): Home/Self Care   Patient Diagnoses: 35 weeks gestation of pregnancy [Z3A 35]     Orders: Admission Orders (From admission, onward)     Ordered        11/28/19 0035  Inpatient Admission  Once                    Assigned Utilization Review Contact: Caren Serrato  Utilization   Network Utilization Review Department  Phone: [phone]; Fax [phone]  Email: [email or web address]   ATTENTION PAYERS: Please call the assigned Utilization  directly with any questions or concerns ALL voicemails in the department are confidential  Send all requests for admission clinical reviews, approved or denied determinations and any other requests to dedicated fax number belonging to the campus where the patient is receiving treatment

## 2019-12-03 NOTE — PROGRESS NOTES
Progress Note - OB/GYN   Regis Paulson 38 y o  female MRN: [id number]  Unit/Bed#: -01 Encounter: [id number]    Assessment:  Post partum Day #5 s/p 1LTCS, stable    Plan:  1  Post partum   - Continue routine post partum care  - Encourage ambulation  - Encourage breastfeeding  - Hgb 13 6 -> 9 5 -> 7 8 --> 8 2 --> 8 7 -->7 9    2  Pre-eclampsia with severe features  - Blood pressures 130-140/80-90 over the last 24h; one isolated 150/93  - S/p magnesium  - CMP with slightly elevated LFTs AST/ALT 33/16 --> 97/63 --> 79/55  - Patient asymptomatic at this time  - Procardia 30 XL     3  Incisional bleeding  - Resolved    4  Shortness of breath  - Per patient improving  - Continue supportive care for presumed congestion      5  Discharge planning  - Anticipate discharge home today        Subjective/Objective   Subjective    Post delivery  Patient is doing well  Lochia WNL  Pain well controlled  Patient reporting that her shortness of breath is improving    Pain: yes,  cramping, improved with meds  Tolerating PO: yes  Voiding: yes  Flatus: yes  BM: no  Ambulating: yes  Breastfeeding:  yes  Chest pain: no  Shortness of breath: intermittent  Leg pain: no  Lochia: minimal    Objective:     Vitals: /91 Comment: nurse Virgen Garcia notified  Pulse 86   Temp 98 2 °F (36 8 °C) (Oral)   Resp 16   LMP 03/22/2019   SpO2 100%   Breastfeeding?  Yes     No intake or output data in the 24 hours ending 12/03/19 0655    Lab Results   Component Value Date    WBC 7 92 12/02/2019    HGB 7 9 (L) 12/02/2019    HCT 25 1 (L) 12/02/2019    MCV 97 12/02/2019     12/02/2019     Lab Results   Component Value Date    SODIUM 139 12/02/2019    K 4 5 12/02/2019     12/02/2019    CO2 27 12/02/2019    AGAP 4 12/02/2019    BUN 11 12/02/2019    CREATININE 0 57 (L) 12/02/2019    GLUC 72 12/02/2019    GLUF 84 06/23/2017    CALCIUM 8 1 (L) 12/02/2019    AST 79 (H) 12/02/2019    ALT 55 12/02/2019    ALKPHOS 360 (H) 12/02/2019    TP 4 7 (L) 12/02/2019    TBILI 0 27 12/02/2019    EGFR 112 12/02/2019         Physical Exam:   Physical Exam  NAD  Breathing comfortably on room air; no wheezes or rales   Abdomen soft, nontender, nondistended  Uterine fundus firm, nontender, incision C/D/I, some bruising noted around incision  WWP, intact distal pulses      Candi Germain MD, PGY-2  12/3/2019  6:55 AM

## 2019-12-03 NOTE — LACTATION NOTE
Met with Josefina Rojas for discharge teaching  Mom has twin babies in the NICU  Mom states she has been putting babies to the breast when she goes to the NICU under the supervision of the NICU  Mom complains of engorged breasts this am   Mom states she has not pumped since last night  Strongly encouraged mom to immediately pump her breasts  Stressed to mom to pump at least 8-12 times every 24 hours  or every 2-3 hours  Instructed mom that if breasts are not emptied, the milk production will drop  Placed warm compresses to the breasts and assisted mom with hand expression  Mom was able to successfully hand express breast milk followed by pumping of the breasts  Encouraged mom to massage breasts toward nipple while pumping to help alleviate any clogged ducts  Mom given tube of fabric with slits to use with pumping so hands are free for massage  Mom states even after hand expression she felt some relief  Mom has no complaints with nipple soreness with pumping  Mom given contact information for outpatient lactation should she need their services along with reasons to call  Booklet included Breast Pumping Instructions, When You Go Back to Work or School, and Breastfeeding Resources for after discharge including access to the number for the [employer]  Discussed s/s engorgement and how to manage with medications as well as s/s mastitis and when to contact physician  Given education on Increasing Breast Milk Supply for  A Baby in the NICU  Demonstrated how to use the pumping log to accommodate expectations on production of breast milk and given a tube of fabric to secure breast pump flanges so mom could massage breasts while pumping to increase her supply  Encoraged MOB  to call for assistance, questions and concerns  Extension number for inpatient lactation support provided

## 2019-12-06 NOTE — UTILIZATION REVIEW
12-02-19  POD # 4 Plan:  1  Post partum   - Continue routine post partum care  - Encourage ambulation  - Encourage breastfeeding  - Hgb 13 6 -> 9 5 -> 7 8 --> 8 2 --> 8 7     2  Pre-eclampsia with severe features  - Blood pressures 135-160/70-90 over the last 24h  - S/p magnesium  - CMP with slightly elevated LFTs AST/ALT 33/16 --> 97/63 --> will repeat this AM  - Patient asymptomatic at this time  - Procardia 30 XL to start this AM  need 24 hrs of procardia watching of BP's before d/c   Date/Time  Temp  Pulse  Resp  BP  SpO2  O2 Device  Cardiac (WDL)  Patient Position - Orthostatic VS   12/03/19 1310  98 2 °F (36 8 °C)  91  16  145/86  --  --  --  --   12/03/19 0801  98 4 °F (36 9 °C)  90  20  137/89  --  --  --  --   12/03/19 0500  --  86  16  145/91   --  --  --  --   BP: nurse Federico Mathur notified at 12/03/19 0500   12/03/19 0017  98 2 °F (36 8 °C)  91  18  150/93   --  --  --  --   BP: nurse Federico Mathur notified at 12/03/19 0017   12/02/19 2036  --  --  --  --  --  None (Room air)  WDL  --   12/02/19 1958  --  98  18  142/86  --  None (Room air)  --  --   12/02/19 1654  99 1 °F (37 3 °C)  109Abnormal   18  133/91  --  None (Room air)  --  Sitting   12/02/19 1344  --  107Abnormal   18  148/95  100 %  None (Room air)  --  Lying   12/02/19 1328  98 2 °F (36 8 °C)  --  --  --  --  --  --  --   12/02/19 0750  98 5 °F (36 9 °C)  87  18  129/79  98 %  None (Room air)  --  Lying   12/02/19 0126  --  87  --  138/88  --  --  --  Lying   12/02/19 0100  97 9 °F (36 6 °C)  100  18  160/94  --  --  --  Sitting   12/01/19 2045  --  112Abnormal   --  156/95  --  --  --  Sitting   12/01/19 2030  --  97  --  165/96   --  None (Room air)  WDL  Sitting   BP: Pt states that her vision is blurred   at 12/01/19 2030       Patient was d/c to home 12-03-19  Infant remained in NICU

## 2019-12-07 ENCOUNTER — POSTPARTUM VISIT (OUTPATIENT)
Dept: OBGYN CLINIC | Facility: CLINIC | Age: 44
End: 2019-12-07

## 2019-12-07 DIAGNOSIS — O13.3 GESTATIONAL HYPERTENSION, THIRD TRIMESTER: ICD-10-CM

## 2019-12-07 PROCEDURE — 99024 POSTOP FOLLOW-UP VISIT: CPT | Performed by: OBSTETRICS & GYNECOLOGY

## 2019-12-07 NOTE — PROGRESS NOTES
Assessment/Plan:    Normal postoperative/postpartum check approximately 1 week status post  section  Blood pressure appears normal on her current antihypertensive  She will continue on that until we reassess her in 2 weeks at her postpartum visit  Continue to increase activity slowly but maintain pelvic rest       Problem List Items Addressed This Visit        Cardiovascular and Mediastinum    Gestational hypertension, third trimester       Other     delivery delivered    Postpartum care following  delivery - Primary            Subjective:      Patient ID: Chon Recinos is a 38 y o  female  Josefina Rojas is here today for a postpartum/postoperative check she delivered approximately a week ago with of twin gestation via primary  section  She had an episode of bleeding from her incision right after the surgery and this was oversewn with a interrupted figure of 8 stitch with good results  She did develop some increase in blood pressure and was started on Procardia and has been doing well since  She really has no complaints today  She feels she is recovering well  She is voiding without difficulty  Bowels have returned to normal function  She has very minimal lochia  And other than some slow ambulation and overall discomfort at the incision she is doing well  The following portions of the patient's history were reviewed and updated as appropriate: allergies, current medications, past family history, past medical history, past social history, past surgical history and problem list     Review of Systems   Constitutional: Negative for chills, fatigue, fever and unexpected weight change  HENT: Negative for dental problem, sinus pressure and sinus pain  Eyes: Negative for visual disturbance  Respiratory: Negative for cough, shortness of breath and wheezing  Cardiovascular: Negative for chest pain and leg swelling     Gastrointestinal: Negative for constipation, diarrhea, nausea and vomiting  Genitourinary: Negative for urgency  Musculoskeletal: Negative for back pain and joint swelling  Allergic/Immunologic: Negative for environmental allergies  Neurological: Negative for dizziness and headaches  Psychiatric/Behavioral: The patient is not nervous/anxious  Objective:      /74 (BP Location: Left arm, Patient Position: Sitting, Cuff Size: Standard)   Ht 5' 2" (1 575 m)   Wt 57 3 kg (126 lb 6 4 oz)   Breastfeeding? Yes   BMI 23 12 kg/m²          Physical Exam   Constitutional: She is oriented to person, place, and time  She appears well-developed and well-nourished  No distress  Petite white female   Abdominal: Soft  She exhibits no distension and no mass  There is no tenderness  There is no guarding  Incision is well-healed there is some slight bruising on the left aspect incision that is soft and appears to be resolving  The small  stitch that was placed to prevent bleeding on the right-hand side is snipped and removed without difficulty  The incision otherwise appears to be healing well  Uterus is well involuted at U -3  Neurological: She is alert and oriented to person, place, and time  Psychiatric: She has a normal mood and affect

## 2019-12-09 ENCOUNTER — TELEPHONE (OUTPATIENT)
Dept: OBGYN CLINIC | Facility: CLINIC | Age: 44
End: 2019-12-09

## 2019-12-09 NOTE — UTILIZATION REVIEW
Notification of Discharge  This is a Notification of Discharge from our facility [address]  Please be advised that this patient has been discharge from our facility  Below you will find the admission and discharge date and time including the patients disposition  PRESENTATION DATE: 11/27/2019 10:58 PM     IP ADMISSION DATE: 11/28/19 0035   DISCHARGE DATE: 12/3/2019  7:00 PM  DISPOSITION: Home/Self Care Home/Self Care   Admission Orders listed below:  Admission Orders (From admission, onward)     Ordered        11/28/19 0035  Inpatient Admission  Once                   Please contact the UR Department if additional information is required to close this patient's authorization/case  [facility]  Utilization Review Department  Main: [phone] x carefully listen to the prompts  All voicemails are confidential   [email or web address]  Send all requests for admission clinical reviews, approved or denied determinations and any other requests to dedicated fax number below belonging to the campus where the patient is receiving treatment   List of dedicated fax numbers:  [address] DENIALS (Administrative/Medical Necessity) [phone]   [address] [place] [address] (Maternity/NICU/Pediatrics) [phone]   [facility] [phone]   Keegan Land [phone]   Nikko Randolph [phone]   [facility] [phone]   [facility] [phone]   [facility]  [phone]   [facility] [address] [phone]

## 2019-12-15 VITALS
HEIGHT: 62 IN | WEIGHT: 126.4 LBS | SYSTOLIC BLOOD PRESSURE: 130 MMHG | DIASTOLIC BLOOD PRESSURE: 74 MMHG | BODY MASS INDEX: 23.26 KG/M2

## 2019-12-23 ENCOUNTER — POSTPARTUM VISIT (OUTPATIENT)
Dept: OBGYN CLINIC | Facility: CLINIC | Age: 44
End: 2019-12-23

## 2019-12-23 VITALS
HEIGHT: 62 IN | WEIGHT: 124 LBS | DIASTOLIC BLOOD PRESSURE: 62 MMHG | SYSTOLIC BLOOD PRESSURE: 124 MMHG | BODY MASS INDEX: 22.82 KG/M2

## 2019-12-23 PROCEDURE — 99024 POSTOP FOLLOW-UP VISIT: CPT | Performed by: NURSE PRACTITIONER

## 2019-12-28 NOTE — PROGRESS NOTES
Yessi Woodard is a 38 y o  female who presents for a postpartum visit  She is 3 weeks postpartum following a primary low transverse  with bilateral salpingectomy  I have fully reviewed the prenatal and intrapartum course  The delivery was at 35 gestational weeks  Outcome: primary  section, low transverse incision  Anesthesia: spinal  Postpartum course has been complicated with some bleeding from incision right after delivery which was corrected and has had no problems since  She recently stopped taking procardia because she was getting very low blood pressures at home  Did not make us or PCP aware of stopping procardia  Her twins course has been uncomplicated  Pt states she is having difficulty with reading things up close, has noticed change in eye sight since delivery  Twins are feeding by both breast and bottle - formula  Bleeding thin lochia  Bowel function is normal  Bladder function is normal  Patient is not sexually active  Contraception method is tubal ligation  Postpartum depression screening: negative  The following portions of the patient's history were reviewed and updated as appropriate: allergies, current medications, past family history, past medical history, past social history, past surgical history and problem list     Review of Systems  Pertinent items are noted in HPI       Objective     /62 (BP Location: Right arm, Patient Position: Sitting, Cuff Size: Standard)   Ht 5' 2" (1 575 m)   Wt 56 2 kg (124 lb)   BMI 22 68 kg/m²    General:  alert and oriented, in no acute distress    Breasts:  not performed   Lungs: clear to auscultation bilaterally   Heart:  regular rate and rhythm, S1, S2 normal, no murmur, click, rub or gallop   Abdomen: soft, non-tender; bowel sounds normal; no masses,  no organomegaly    Vulva:  normal   Vagina: normal vagina, no discharge, exudate, lesion, or erythema   Cervix:  no cervical motion tenderness and no lesions   Corpus: normal size, contour, position, consistency, mobility, non-tender   Adnexa:  no mass, fullness, tenderness   Rectal Exam: not performed     Assessment/Plan     normal postpartum exam      1  Contraception: tubal ligation  2  Discussed waiting for bleeding to stop before resuming intercourse  Advised water based lubricant and taking things slow  3 Blood pressure WNL today advised pt to continue to monitor BP and call with any elevated blood pressures  Pt to follow up with PCP for blood pressure  4  Advised to follow up with ophthalmologist due to far sightedness since delivery  Discussed eye sight change with pregnancy normal and may require prescription lenses  5  Follow up in: 4 months for annual exam or as needed

## 2021-09-02 ENCOUNTER — OFFICE VISIT (OUTPATIENT)
Dept: OBGYN CLINIC | Facility: CLINIC | Age: 46
End: 2021-09-02
Payer: COMMERCIAL

## 2021-09-02 VITALS — WEIGHT: 125 LBS | BODY MASS INDEX: 22.86 KG/M2 | DIASTOLIC BLOOD PRESSURE: 80 MMHG | SYSTOLIC BLOOD PRESSURE: 102 MMHG

## 2021-09-02 DIAGNOSIS — N95.1 MENOPAUSAL SYMPTOM: ICD-10-CM

## 2021-09-02 DIAGNOSIS — Z12.31 ENCOUNTER FOR SCREENING MAMMOGRAM FOR MALIGNANT NEOPLASM OF BREAST: Primary | ICD-10-CM

## 2021-09-02 DIAGNOSIS — Z12.4 SCREENING FOR MALIGNANT NEOPLASM OF CERVIX: ICD-10-CM

## 2021-09-02 PROCEDURE — G0145 SCR C/V CYTO,THINLAYER,RESCR: HCPCS | Performed by: PATHOLOGY

## 2021-09-02 PROCEDURE — G0476 HPV COMBO ASSAY CA SCREEN: HCPCS | Performed by: OBSTETRICS & GYNECOLOGY

## 2021-09-02 PROCEDURE — S0612 ANNUAL GYNECOLOGICAL EXAMINA: HCPCS | Performed by: OBSTETRICS & GYNECOLOGY

## 2021-09-02 PROCEDURE — G0124 SCREEN C/V THIN LAYER BY MD: HCPCS | Performed by: PATHOLOGY

## 2021-09-02 RX ORDER — ALPRAZOLAM 0.5 MG/1
TABLET ORAL
COMMUNITY
Start: 2021-07-30

## 2021-09-02 RX ORDER — MELOXICAM 7.5 MG/1
TABLET ORAL
COMMUNITY
Start: 2021-08-23

## 2021-09-02 NOTE — PROGRESS NOTES
Assessment/Plan:         Diagnoses and all orders for this visit:    Encounter for screening mammogram for malignant neoplasm of breast  -     Mammo screening bilateral w 3d & cad; Future    Screening for malignant neoplasm of cervix  -     Liquid-based pap, screening    Menopausal symptom  -     Norethin-Eth Estrad-Fe Biphas 1 MG-10 MCG / 10 MCG TABS; Take 1 tablet by mouth daily    Other orders  -     ALPRAZolam (XANAX) 0 5 mg tablet; AS NEEDED 0 5 1 TABLET BY MOUTH DAILY  -     Cholecalciferol 50 MCG (2000 UT) CAPS; Take 1 capsule by mouth  -     meloxicam (MOBIC) 7 5 mg tablet          Subjective:      Patient ID: Debby Kern is a 51 y o  female  The patient is a 51year old female who presents for annual exam and concern for sudden onset of insomnia  She has been prescribed xanax, which helps with the insomnia, but she is concerned about using a medication that causes sedation as she is raising 18 month old twin boys  We discussed the possibility that decreasing estrogen levels in her forties could cause menopausal symptoms, and the insomnia might improve with a low dose OCP  She still has menses most months, with some degree of irregularity  She also complains of vaginal dryness which she first noted after the birth of her twins  It is possible that the OCP might improve the vaginal dryness, as well  She is due for a mammogram and will schedule one in the near future  A pap was performed today  She should return in one year or as needed  The following portions of the patient's history were reviewed and updated as appropriate: allergies, current medications, past family history, past medical history, past social history, past surgical history and problem list     Review of Systems   Constitutional: Negative for chills, diaphoresis, fatigue, fever and unexpected weight change  HENT: Negative for congestion, sinus pressure, sinus pain, tinnitus and trouble swallowing      Eyes: Negative for visual disturbance  Respiratory: Negative for cough, chest tightness and shortness of breath  Cardiovascular: Negative for chest pain, palpitations and leg swelling  Gastrointestinal: Negative for abdominal distention, abdominal pain, anal bleeding, constipation, diarrhea, nausea, rectal pain and vomiting  Endocrine: Negative for heat intolerance  Genitourinary: Negative for difficulty urinating, dysuria, flank pain, frequency, genital sores, hematuria and urgency  Musculoskeletal: Negative for arthralgias, back pain and joint swelling  Skin: Negative for rash  Allergic/Immunologic: Negative for environmental allergies and food allergies  Neurological: Negative for headaches  Hematological: Negative for adenopathy  Does not bruise/bleed easily  Psychiatric/Behavioral: Negative for decreased concentration and dysphoric mood  The patient is not nervous/anxious  Objective:      /80   Wt 56 7 kg (125 lb)   LMP 08/30/2021 (Approximate)   BMI 22 86 kg/m²          Physical Exam  Vitals and nursing note reviewed  Exam conducted with a chaperone present  Constitutional:       General: She is not in acute distress  Appearance: Normal appearance  She is normal weight  She is not ill-appearing  HENT:      Head: Normocephalic  Nose: Nose normal       Mouth/Throat:      Mouth: Mucous membranes are moist       Pharynx: Oropharynx is clear  Eyes:      Conjunctiva/sclera: Conjunctivae normal       Pupils: Pupils are equal, round, and reactive to light  Cardiovascular:      Rate and Rhythm: Normal rate and regular rhythm  Pulses: Normal pulses  Pulmonary:      Effort: Pulmonary effort is normal       Breath sounds: Normal breath sounds  Chest:      Breasts: Serafin Score is 5  Right: Normal  No mass, nipple discharge, skin change or tenderness  Left: Normal  No mass, nipple discharge, skin change or tenderness     Abdominal:      General: Abdomen is flat  Bowel sounds are normal       Palpations: Abdomen is soft  Genitourinary:     General: Normal vulva  Exam position: Lithotomy position  Serafin stage (genital): 5       Vagina: Normal       Cervix: Normal       Uterus: Normal        Adnexa: Right adnexa normal and left adnexa normal       Rectum: Normal    Musculoskeletal:         General: Normal range of motion  Cervical back: Neck supple  Lymphadenopathy:      Upper Body:      Right upper body: No axillary adenopathy  Left upper body: No axillary adenopathy  Skin:     General: Skin is warm and dry  Neurological:      General: No focal deficit present  Mental Status: She is alert     Psychiatric:         Mood and Affect: Mood normal

## 2021-09-08 LAB
HPV HR 12 DNA CVX QL NAA+PROBE: NEGATIVE
HPV16 DNA CVX QL NAA+PROBE: NEGATIVE
HPV18 DNA CVX QL NAA+PROBE: NEGATIVE

## 2021-09-10 LAB
LAB AP GYN PRIMARY INTERPRETATION: NORMAL
Lab: NORMAL
PATH INTERP SPEC-IMP: NORMAL

## 2022-02-24 ENCOUNTER — OFFICE VISIT (OUTPATIENT)
Dept: OBGYN CLINIC | Facility: CLINIC | Age: 47
End: 2022-02-24
Payer: COMMERCIAL

## 2022-02-24 VITALS
HEIGHT: 62 IN | SYSTOLIC BLOOD PRESSURE: 110 MMHG | BODY MASS INDEX: 23.85 KG/M2 | DIASTOLIC BLOOD PRESSURE: 74 MMHG | WEIGHT: 129.6 LBS

## 2022-02-24 DIAGNOSIS — N64.4 BREAST PAIN: Primary | ICD-10-CM

## 2022-02-24 PROBLEM — O14.90 PRE-ECLAMPSIA: Status: RESOLVED | Noted: 2019-12-03 | Resolved: 2022-02-24

## 2022-02-24 PROBLEM — O13.3 GESTATIONAL HYPERTENSION, THIRD TRIMESTER: Status: RESOLVED | Noted: 2019-11-26 | Resolved: 2022-02-24

## 2022-02-24 PROBLEM — O30.043 DICHORIONIC DIAMNIOTIC TWIN PREGNANCY IN THIRD TRIMESTER: Status: RESOLVED | Noted: 2019-10-05 | Resolved: 2022-02-24

## 2022-02-24 PROCEDURE — 99213 OFFICE O/P EST LOW 20 MIN: CPT | Performed by: PHYSICIAN ASSISTANT

## 2022-02-24 NOTE — PATIENT INSTRUCTIONS
Go for mammogram   Cut back on caffeine intake around periods  Call if cycles continue to be irregular

## 2022-02-24 NOTE — PROGRESS NOTES
Assessment/Plan:    No problem-specific Assessment & Plan notes found for this encounter  Diagnoses and all orders for this visit:    Breast pain  -     Mammo diagnostic bilateral w 3d & cad; Future        Order for diagnostic mammogram entered  No abnormalities felt on exam   Suspect pain was related to menses  Call if periods continue to be irregular  Try to decrease caffeine intake around cycles  If no problems, patient to return in 1 year for routine gyn care  Subjective:      Patient ID: Soco Sorto is a 51 y o  female  Patient is here with complaint of L breast pain  It is located along the outer breast and started about a week ago  Patient states she got her period today even though it's only been 3 weeks since her last cycle, and pain resolved  She denies new masses, skin changes, erythema, swelling, rash, and nipple discharge  No fever/chills  No changes to R breast   Has not had a mammogram in 3 years  Drinks two cups of coffee daily  The following portions of the patient's history were reviewed and updated as appropriate: allergies, current medications, past family history, past medical history, past social history, past surgical history and problem list     Review of Systems   Constitutional: Negative for chills and fever  Cardiovascular:        Pain of outer L breast; no new masses, skin changes, erythema, swelling, rash, and nipple discharge  No changes to R breast          Objective:      /74 (BP Location: Left arm, Patient Position: Sitting, Cuff Size: Standard)   Ht 5' 2" (1 575 m)   Wt 58 8 kg (129 lb 9 6 oz)   LMP 02/23/2022   BMI 23 70 kg/m²          Physical Exam  Vitals reviewed  Constitutional:       Appearance: Normal appearance  She is well-developed and normal weight  Chest:   Breasts: Breasts are symmetrical       Right: Normal  No swelling, bleeding, inverted nipple, mass, nipple discharge, skin change or tenderness        Left: Normal  No swelling, bleeding, inverted nipple, mass, nipple discharge, skin change or tenderness  Skin:     General: Skin is warm and dry  Neurological:      Mental Status: She is alert and oriented to person, place, and time  Psychiatric:         Mood and Affect: Mood normal          Behavior: Behavior normal  Behavior is cooperative  Thought Content:  Thought content normal          Judgment: Judgment normal

## 2022-03-24 ENCOUNTER — HOSPITAL ENCOUNTER (OUTPATIENT)
Dept: ULTRASOUND IMAGING | Facility: CLINIC | Age: 47
Discharge: HOME | End: 2022-03-24
Payer: COMMERCIAL

## 2022-03-24 ENCOUNTER — HOSPITAL ENCOUNTER (OUTPATIENT)
Dept: MAMMOGRAPHY | Facility: CLINIC | Age: 47
Discharge: HOME | End: 2022-03-24
Payer: COMMERCIAL

## 2022-03-24 DIAGNOSIS — N64.4 BREAST PAIN: ICD-10-CM

## 2022-03-24 PROCEDURE — 77066 DX MAMMO INCL CAD BI: CPT

## 2022-03-24 PROCEDURE — 76642 ULTRASOUND BREAST LIMITED: CPT

## 2022-03-24 PROCEDURE — G0279 TOMOSYNTHESIS, MAMMO: HCPCS

## 2022-03-25 ENCOUNTER — TELEPHONE (OUTPATIENT)
Dept: OBGYN CLINIC | Facility: CLINIC | Age: 47
End: 2022-03-25

## 2022-03-25 NOTE — TELEPHONE ENCOUNTER
Spoke with patient regarding breast imaging results - benign  Breast pain has resolved  New, probable benign mass noted in L breast; needs U/S f/u in 6 months  Appointment is scheduled for September  Call with further questions

## 2022-08-23 ENCOUNTER — APPOINTMENT (OUTPATIENT)
Dept: RADIOLOGY | Facility: HOSPITAL | Age: 47
End: 2022-08-23
Payer: COMMERCIAL

## 2022-08-23 ENCOUNTER — HOSPITAL ENCOUNTER (EMERGENCY)
Facility: HOSPITAL | Age: 47
Discharge: HOME | End: 2022-08-23
Attending: EMERGENCY MEDICINE
Payer: COMMERCIAL

## 2022-08-23 VITALS
BODY MASS INDEX: 23.23 KG/M2 | OXYGEN SATURATION: 98 % | DIASTOLIC BLOOD PRESSURE: 81 MMHG | SYSTOLIC BLOOD PRESSURE: 150 MMHG | RESPIRATION RATE: 18 BRPM | TEMPERATURE: 98.3 F | HEART RATE: 84 BPM | WEIGHT: 126.98 LBS

## 2022-08-23 DIAGNOSIS — J06.9 VIRAL UPPER RESPIRATORY TRACT INFECTION: Primary | ICD-10-CM

## 2022-08-23 LAB
FLUAV RNA RESP QL NAA+PROBE: NEGATIVE
FLUBV RNA RESP QL NAA+PROBE: NEGATIVE
RSV RNA RESP QL NAA+PROBE: NEGATIVE
SARS-COV-2 RNA RESP QL NAA+PROBE: NEGATIVE

## 2022-08-23 PROCEDURE — 71046 X-RAY EXAM CHEST 2 VIEWS: CPT

## 2022-08-23 PROCEDURE — 0241U HB NFCT DS VIR RESP RNA 4 TRGT: CPT | Performed by: EMERGENCY MEDICINE

## 2022-08-23 PROCEDURE — 99282 EMERGENCY DEPT VISIT SF MDM: CPT | Performed by: EMERGENCY MEDICINE

## 2022-08-23 PROCEDURE — 99283 EMERGENCY DEPT VISIT LOW MDM: CPT

## 2022-08-24 NOTE — ED PROVIDER NOTES
History  Chief Complaint   Patient presents with    Cough     Pt reports worsening cough over last 4 days, sore throat, fatigue, and some SOB  Denies CP/N/V/D/Fevers     65-year-old female, presents with cough  Patient states for the past 3 days she has had frequent, nonproductive cough  Denies any fevers  History provided by:  Patient   used: No    Cough  Associated symptoms: no fever        Prior to Admission Medications   Prescriptions Last Dose Informant Patient Reported? Taking?    ALPRAZolam (XANAX) 0 5 mg tablet   Yes No   Sig: AS NEEDED 0 5 1 TABLET BY MOUTH DAILY   Patient not taking: Reported on 2/24/2022   Cholecalciferol 50 MCG (2000 UT) CAPS   Yes No   Sig: Take 1 capsule by mouth   Docosahexaenoic Acid (DHA OMEGA 3 PO)  Self Yes No   Sig: Take 1 tablet by mouth daily   Patient not taking: Reported on 9/2/2021   NIFEdipine ER (ADALAT CC) 30 MG 24 hr tablet   No No   Sig: Take 1 tablet (30 mg total) by mouth daily   Patient not taking: Reported on 9/2/2021   Norethin-Eth Estrad-Fe Biphas 1 MG-10 MCG / 10 MCG TABS   No No   Sig: Take 1 tablet by mouth daily   Prenatal MV-Min-Fe Fum-FA-DHA (PRENATAL 1 PO)  Self Yes No   Sig: Take 1 tablet by mouth   Patient not taking: Reported on 9/2/2021   calcium carbonate (OS-KHURRAM) 600 MG tablet  Self Yes No   Sig: Take 600 mg by mouth daily   Patient not taking: Reported on 9/2/2021   diphenhydrAMINE (BENADRYL) 25 mg capsule  Self Yes No   Sig: Take 25 mg by mouth every 6 (six) hours as needed for itching   Patient not taking: Reported on 9/2/2021   docusate sodium (COLACE) 100 mg capsule   No No   Sig: Take 1 capsule (100 mg total) by mouth 2 (two) times a day   Patient not taking: Reported on 8/8/7206   folic acid (FOLVITE) 864 MCG tablet  Self Yes No   Sig: Take 800 mcg by mouth daily   Patient not taking: Reported on 9/2/2021   ibuprofen (MOTRIN) 600 mg tablet   No No   Sig: Take 1 tablet (600 mg total) by mouth every 6 (six) hours Patient not taking: Reported on 2021   magnesium 30 MG tablet  Self Yes No   Sig: Take 30 mg by mouth daily   Patient not taking: Reported on 2022    meloxicam (MOBIC) 7 5 mg tablet   Yes No   Patient not taking: Reported on 2022    ondansetron (ZOFRAN) 8 mg tablet  Self No No   Sig: Take 1 tablet (8 mg total) by mouth every 8 (eight) hours as needed for nausea or vomiting   Patient not taking: Reported on 2019   ondansetron (ZOFRAN-ODT) 8 mg disintegrating tablet  Self No No   Sig: Take 1 tablet (8 mg total) by mouth every 8 (eight) hours as needed for nausea or vomiting   Patient not taking: Reported on 2019   vitamin E 100 UNIT capsule  Self Yes No   Sig: Take 100 Units by mouth daily   Patient not taking: Reported on 2021      Facility-Administered Medications: None       Past Medical History:   Diagnosis Date    AMA (advanced maternal age) multigravida 35+     Asthma     childhood    Disease of thyroid gland     Female infertility     Hyperlipidemia     Varicella     vac       Past Surgical History:   Procedure Laterality Date    AUGMENTATION MAMMAPLASTY Bilateral     BREAST IMPLANT      FERTILITY SURGERY      NOSE SURGERY      [place]  DELIVERY ONLY N/A 2019    Procedure:  SECTION ();   Surgeon: Kayley Nichols MD;  [facility] ;  Service: Obstetrics    [place] TUBE W/ Bilateral 2019    Procedure: LIGATION/COAGULATION TUBAL;  Surgeon: Kayley Nichols MD;  [facility] ;  Service: Obstetrics       Family History   Problem Relation Age of Onset    No Known Problems Mother     Heart disease Father         defect    Valvular heart disease Father     Alzheimer's disease Maternal Grandmother     Diabetes Maternal Grandfather         type 2    Diabetes Paternal Aunt         type 2    No Known Problems Son     No Known Problems Son      I have reviewed and agree with the history as documented  E-Cigarette/Vaping    E-Cigarette Use Never User      E-Cigarette/Vaping Substances    Nicotine No     Flavoring No      Social History     Tobacco Use    Smoking status: Never Smoker    Smokeless tobacco: Never Used   Vaping Use    Vaping Use: Never used   Substance Use Topics    Alcohol use: Yes     Comment: socially when not pregnant    Drug use: Never       Review of Systems   Constitutional: Negative  Negative for fever  HENT: Positive for congestion  Eyes: Negative  Respiratory: Positive for cough  Gastrointestinal: Negative  Skin: Negative  Neurological: Negative  Physical Exam  Physical Exam  Vitals and nursing note reviewed  Constitutional:       General: She is not in acute distress  HENT:      Head: Normocephalic and atraumatic  Mouth/Throat:      Mouth: Mucous membranes are moist       Pharynx: Oropharynx is clear  No oropharyngeal exudate or posterior oropharyngeal erythema  Eyes:      Extraocular Movements: Extraocular movements intact  Pupils: Pupils are equal, round, and reactive to light  Cardiovascular:      Rate and Rhythm: Normal rate and regular rhythm  Pulmonary:      Effort: Pulmonary effort is normal       Breath sounds: Normal breath sounds  No wheezing  Skin:     General: Skin is warm and dry  Neurological:      General: No focal deficit present  Mental Status: She is alert and oriented to person, place, and time  Motor: No weakness        Gait: Gait normal          Vital Signs  ED Triage Vitals [08/23/22 2100]   Temperature Pulse Respirations Blood Pressure SpO2   98 3 °F (36 8 °C) 84 18 150/81 98 %      Temp Source Heart Rate Source Patient Position - Orthostatic VS BP Location FiO2 (%)   Oral Monitor -- -- --      Pain Score       --           Vitals:    08/23/22 2100   BP: 150/81   Pulse: 84         Visual Acuity      ED Medications  Medications - No data to display    Diagnostic Studies  Results Reviewed Procedure Component Value Units Date/Time    FLU/RSV/COVID - if FLU/RSV clinically relevant [[id number]]  (Normal) Collected: 08/23/22 2104    Lab Status: Final result Specimen: Nares from Nose Updated: 08/23/22 2148     SARS-CoV-2 Negative     INFLUENZA A PCR Negative     INFLUENZA B PCR Negative     RSV PCR Negative    Narrative:      FOR PEDIATRIC PATIENTS - copy/paste COVID Guidelines URL to browser: [email or web address]  PrintFu    SARS-CoV-2 assay is a Nucleic Acid Amplification assay intended for the  qualitative detection of nucleic acid from SARS-CoV-2 in nasopharyngeal  swabs  Results are for the presumptive identification of SARS-CoV-2 RNA  Positive results are indicative of infection with SARS-CoV-2, the virus  causing COVID-19, but do not rule out bacterial infection or co-infection  with other viruses  Laboratories within the [place] and its  territories are required to report all positive results to the appropriate  public health authorities  Negative results do not preclude SARS-CoV-2  infection and should not be used as the sole basis for treatment or other  patient management decisions  Negative results must be combined with  clinical observations, patient history, and epidemiological information  This test has not been FDA cleared or approved  This test has been authorized by FDA under an Emergency Use Authorization  (EUA)  This test is only authorized for the duration of time the  declaration that circumstances exist justifying the authorization of the  emergency use of an in vitro diagnostic tests for detection of SARS-CoV-2  virus and/or diagnosis of COVID-19 infection under section 564(b)(1) of  the Act, 21 U  S C  106ANK-8(F)(7), unless the authorization is terminated  or revoked sooner  The test has been validated but independent review by FDA  and CLIA is pending  Test performed using [employer]:  This RT-PCR assay targets N2,  a region unique to SARS-CoV-2  A conserved region in the E-gene was chosen  for pan-Sarbecovirus detection which includes SARS-CoV-2  XR chest 2 views    (Results Pending)              Procedures  Procedures         ED Course  ED Course as of 08/23/22 2155   Tue Aug 23, 2022   2138 Chest x-ray reviewed by myself, no infiltrate, no acute pulmonary disease  MDM  Number of Diagnoses or Management Options  Viral upper respiratory tract infection  Diagnosis management comments: 70-year-old female, presenting with cough  Differential diagnosis includes URI, bronchitis, pneumonia among other diagnosis  X-ray and COVID PCR testing performed  Patient's x-ray with no infiltrate, lungs clear on exam with no wheezing  Patient with normal respiratory effort, oxygen saturation good room air, patient able ambulate in ED without difficulty  Discussed with patient follow-up results of PCR testing, use over-the-counter medications as needed  Instructed to follow-up return for any worsening or new concerning symptoms  Amount and/or Complexity of Data Reviewed  Clinical lab tests: ordered  Tests in the radiology section of CPT®: ordered and reviewed  Independent visualization of images, tracings, or specimens: yes        Disposition  Final diagnoses:   Viral upper respiratory tract infection     Time reflects when diagnosis was documented in both MDM as applicable and the Disposition within this note     Time User Action Codes Description Comment    8/23/2022  9:45 PM Mariola Feldman [J06 [id number]] Viral upper respiratory tract infection       ED Disposition     ED Disposition   Discharge    Condition   Stable    Date/Time   Tue Aug 23, 2022  9:45 PM    [facility] discharge to home/self care                 Follow-up Information    None         Patient's Medications   Discharge Prescriptions    No medications on file       No discharge procedures on file      PDMP Review     None          ED Provider  Electronically Signed by           Jose L Valdovinos MD  08/23/22 1653

## 2023-04-27 ENCOUNTER — OFFICE VISIT (OUTPATIENT)
Dept: OBGYN CLINIC | Facility: CLINIC | Age: 48
End: 2023-04-27

## 2023-04-27 VITALS
HEIGHT: 60 IN | WEIGHT: 125 LBS | BODY MASS INDEX: 24.54 KG/M2 | SYSTOLIC BLOOD PRESSURE: 122 MMHG | DIASTOLIC BLOOD PRESSURE: 78 MMHG

## 2023-04-27 DIAGNOSIS — Z12.31 ENCOUNTER FOR MAMMOGRAM TO ESTABLISH BASELINE MAMMOGRAM: ICD-10-CM

## 2023-04-27 DIAGNOSIS — Z01.419 WELL WOMAN EXAM WITH ROUTINE GYNECOLOGICAL EXAM: Primary | ICD-10-CM

## 2023-04-27 DIAGNOSIS — Z12.11 SCREENING FOR COLON CANCER: ICD-10-CM

## 2023-04-27 RX ORDER — PERPHENAZINE 4 MG
TABLET ORAL
COMMUNITY

## 2023-04-27 RX ORDER — BIOTIN 1 MG
1000 TABLET ORAL 3 TIMES DAILY
COMMUNITY

## 2023-04-27 NOTE — PROGRESS NOTES
ASSESSMENT & PLAN:   Diagnoses and all orders for this visit:    Well woman exam with routine gynecological exam    Encounter for mammogram to establish baseline mammogram  -     Mammo screening bilateral w 3d & cad; Future    Screening for colon cancer  -     Cologuard    Other orders  -     Biotin 1000 MCG tablet; Take 1,000 mcg by mouth 3 (three) times a day  -     Collagen-Vitamin C-Biotin (Collagen 1500/C) 500-50-0 8 MG CAPS; Take by mouth          The following were reviewed in today's visit: ASCCP guidelines, breast self exam, mammography screening ordered, exercise, healthy diet and colonoscopy discussed and ordered  Patient to return to office in yearly for annual exam      All questions have been answered to her satisfaction  CC:  Annual Gynecologic Examination  Chief Complaint   Patient presents with   • New Patient Visit     NP here for her annual exam  pap w/hpv 9/2/2021 neg/neg  3d diag mammogram 3/24/2022       HPI: Jimenez Hernandez is a 49 y o  [place] who presents for annual gynecologic examination  She has the following concerns:  Experiencing difficulty staying asleep  Regularly wakes up in the middle of the night  Has started taking melatonin which helps significantly  Does not wake up d/t nigtht sweats, just wakes up spontaneously  Just wants to know if this is perimenopause           Health Maintenance:    Exercise: daily  Breast exams/breast awareness: yes  Diet: well balanced diet  Last mammogram: 3/2022 birads-2 on R, birads-3 on left with diagnostic imaging reassuring and recommended to be repeated in 6 mo   Colorectal cancer screening: never done, had a colonoscopy many years ago when she was having difficulty with constipation that was normal      Past Medical History:   Diagnosis Date   • AMA (advanced maternal age) multigravida 35+    • Asthma     childhood   • Disease of thyroid gland    • Female infertility    • Hyperlipidemia    • Varicella     vac       Past Surgical History:   Procedure Laterality Date   • AUGMENTATION MAMMAPLASTY Bilateral    • BREAST IMPLANT     • FERTILITY SURGERY     • NOSE SURGERY     • [place]  DELIVERY ONLY N/A 2019    Procedure:  SECTION (); Surgeon: Lincoln Merchant MD;  [facility] ;  Service: Obstetrics   • [place] LIG/TRNSXJ FALOPIAN TUBE  DEL/ABDML SURG Bilateral 2019    Procedure: LIGATION/COAGULATION TUBAL;  Surgeon: Lincoln Merchant MD;  [facility] ;  Service: Obstetrics       Past OB/Gyn History:  Period Duration (Days): 1-3  Period Pattern: (!) Irregular  Menstrual Flow: Heavy  Menstrual Control: Thin padPatient's last menstrual period was 2023 (exact date)  Menopausal status: premenopausal    Last Pap: 2021 : NILM, neg HR HPV  History of abnormal Pap smear: no    Patient is currently sexually active       Current contraception: tubal ligation      Family History  Family History   Problem Relation Age of Onset   • No Known Problems Mother    • Heart disease Father         defect   • Valvular heart disease Father    • Alzheimer's disease Maternal Grandmother    • Diabetes Maternal Grandfather         type 2   • Diabetes Paternal Aunt         type 2   • No Known Problems Son    • No Known Problems Son        Family history of uterine or ovarian cancer: no  Family history of breast cancer: no  Family history of colon cancer: no    Social History:  Social History     Socioeconomic History   • Marital status: /Civil Union     Spouse name: Not on file   • Number of children: Not on file   • Years of education: Not on file   • Highest education level: Not on file   Occupational History   • Not on file   Tobacco Use   • Smoking status: Never   • Smokeless tobacco: Never   Vaping Use   • Vaping Use: Never used   Substance and Sexual Activity   • Alcohol use: Yes     Comment: socially when not pregnant   • Drug use: Never   • Sexual activity: Yes     Partners: Male     Birth control/protection: None, Female Sterilization   Other Topics Concern   • Not on file   Social History Narrative    Drinks coffee    Exercise habits     Social Determinants of Health     Financial Resource Strain: Not on file   Food Insecurity: Not on file   Transportation Needs: Not on file   Physical Activity: Not on file   Stress: Not on file   Social Connections: Not on file   Intimate Partner Violence: Not on file   Housing Stability: Not on file     Domestic violence screen: negative    Allergies:   Allergies   Allergen Reactions   • Penicillins Hives and Rash       Medications:    Current Outpatient Medications:   •  Biotin 1000 MCG tablet, Take 1,000 mcg by mouth 3 (three) times a day, Disp: , Rfl:   •  Cholecalciferol 50 MCG (2000 UT) CAPS, Take 1 capsule by mouth, Disp: , Rfl:   •  Collagen-Vitamin C-Biotin (Collagen 1500/C) 500-50-0 8 MG CAPS, Take by mouth, Disp: , Rfl:   •  ALPRAZolam (XANAX) 0 5 mg tablet, AS NEEDED 0 5 1 TABLET BY MOUTH DAILY (Patient not taking: No sig reported), Disp: , Rfl:   •  calcium carbonate (OS-KHURRAM) 600 MG tablet, Take 600 mg by mouth daily (Patient not taking: Reported on 9/2/2021), Disp: , Rfl:   •  diphenhydrAMINE (BENADRYL) 25 mg capsule, Take 25 mg by mouth every 6 (six) hours as needed for itching (Patient not taking: Reported on 9/2/2021), Disp: , Rfl:   •  Docosahexaenoic Acid (DHA OMEGA 3 PO), Take 1 tablet by mouth daily (Patient not taking: Reported on 9/2/2021), Disp: , Rfl:   •  docusate sodium (COLACE) 100 mg capsule, Take 1 capsule (100 mg total) by mouth 2 (two) times a day (Patient not taking: Reported on 9/2/2021), Disp: 10 capsule, Rfl: 0  •  folic acid (FOLVITE) 575 MCG tablet, Take 800 mcg by mouth daily (Patient not taking: Reported on 9/2/2021), Disp: , Rfl:   •  ibuprofen (MOTRIN) 600 mg tablet, Take 1 tablet (600 mg total) by mouth every 6 (six) hours (Patient not taking: Reported on 9/2/2021), Disp: 30 tablet, Rfl: 0  •  magnesium 30 MG tablet, Take "30 mg by mouth daily (Patient not taking: Reported on 2/24/2022), Disp: , Rfl:   •  meloxicam (MOBIC) 7 5 mg tablet, , Disp: , Rfl:   •  NIFEdipine ER (ADALAT CC) 30 MG 24 hr tablet, Take 1 tablet (30 mg total) by mouth daily (Patient not taking: Reported on 9/2/2021), Disp: 30 tablet, Rfl: 2  •  Norethin-Eth Estrad-Fe Biphas 1 MG-10 MCG / 10 MCG TABS, Take 1 tablet by mouth daily (Patient not taking: Reported on 4/27/2023), Disp: 84 tablet, Rfl: 3  •  ondansetron (ZOFRAN) 8 mg tablet, Take 1 tablet (8 mg total) by mouth every 8 (eight) hours as needed for nausea or vomiting (Patient not taking: Reported on 12/7/2019), Disp: 30 tablet, Rfl: 0  •  ondansetron (ZOFRAN-ODT) 8 mg disintegrating tablet, Take 1 tablet (8 mg total) by mouth every 8 (eight) hours as needed for nausea or vomiting (Patient not taking: Reported on 12/7/2019), Disp: 20 tablet, Rfl: 1  •  Prenatal MV-Min-Fe Fum-FA-DHA (PRENATAL 1 PO), Take 1 tablet by mouth (Patient not taking: Reported on 9/2/2021), Disp: , Rfl:   •  vitamin E 100 UNIT capsule, Take 100 Units by mouth daily (Patient not taking: Reported on 9/2/2021), Disp: , Rfl:     Review of Systems:  Denies fevers, chills, unintentional weight loss, chest pain, shortness of breath, abdominal pain, nausea, vomiting, urinary incontinence, urinary frequency, vaginal bleeding, vaginal discharge  All other systems negative unless otherwise stated  Physical Exam:  /78 (BP Location: Right arm, Patient Position: Sitting, Cuff Size: Standard)   Ht 5' 0 1\" (1 527 m)   Wt 56 7 kg (125 lb)   LMP 04/21/2023 (Exact Date)   Breastfeeding No   BMI 24 33 kg/m²  Body mass index is 24 33 kg/m²  GEN: The patient was alert and oriented x3, pleasant well-appearing female in no acute distress     HEENT:  Unremarkable, no anterior or posterior lymphadenopathy, no thyromegaly  CV:  Regular rate and rhythm, normal S1 and S2, no murmurs  RESP:  Clear to auscultation bilaterally, no wheezes, rales or " rhonchi  BREAST:  Symmetric breasts with no palpable breast masses or obvious breast lesions  She has no retractions or nipple discharge  She has no axillary abnormalities or palpable masses  GI:  Soft, nontender, non-distended  MSK: bilateral lower extremities are nontender, no edema  : Normal appearing external female genitalia, normal appearing urethral meatus  Normal appearing vaginal epithelium  On bimanual exam, no cervical motion tenderness; uterus is smooth, mobile, nontender 6 weeks size  No tenderness or fullness in the bilateral adnexa

## 2024-05-02 ENCOUNTER — ANNUAL EXAM (OUTPATIENT)
Dept: OBGYN CLINIC | Facility: CLINIC | Age: 49
End: 2024-05-02
Payer: COMMERCIAL

## 2024-05-02 VITALS
DIASTOLIC BLOOD PRESSURE: 62 MMHG | BODY MASS INDEX: 23.56 KG/M2 | SYSTOLIC BLOOD PRESSURE: 102 MMHG | WEIGHT: 120 LBS | HEIGHT: 60 IN

## 2024-05-02 DIAGNOSIS — Z12.11 SCREENING FOR COLON CANCER: ICD-10-CM

## 2024-05-02 DIAGNOSIS — Z12.31 ENCOUNTER FOR SCREENING MAMMOGRAM FOR MALIGNANT NEOPLASM OF BREAST: ICD-10-CM

## 2024-05-02 DIAGNOSIS — Z12.4 SCREENING FOR MALIGNANT NEOPLASM OF CERVIX: ICD-10-CM

## 2024-05-02 DIAGNOSIS — Z11.51 SCREENING FOR HUMAN PAPILLOMAVIRUS (HPV): ICD-10-CM

## 2024-05-02 DIAGNOSIS — Z01.419 WELL WOMAN EXAM WITH ROUTINE GYNECOLOGICAL EXAM: Primary | ICD-10-CM

## 2024-05-02 PROCEDURE — G0145 SCR C/V CYTO,THINLAYER,RESCR: HCPCS | Performed by: OBSTETRICS & GYNECOLOGY

## 2024-05-02 PROCEDURE — G0476 HPV COMBO ASSAY CA SCREEN: HCPCS | Performed by: OBSTETRICS & GYNECOLOGY

## 2024-05-02 PROCEDURE — S0612 ANNUAL GYNECOLOGICAL EXAMINA: HCPCS | Performed by: OBSTETRICS & GYNECOLOGY

## 2024-05-02 NOTE — PROGRESS NOTES
ASSESSMENT & PLAN:   Diagnoses and all orders for this visit:    Well woman exam with routine gynecological exam  -     Liquid-based pap, screening    Encounter for screening mammogram for malignant neoplasm of breast  -     Mammo screening bilateral w 3d & cad; Future    Screening for colon cancer  -     Ambulatory referral to Gastroenterology; Future    Screening for malignant neoplasm of cervix  -     Liquid-based pap, screening    Screening for human papillomavirus (HPV)  -     Liquid-based pap, screening    Other orders  -     VITAMIN E PO          The following were reviewed in today's visit: ASCCP guidelines, Gardisil vaccination, STD testing breast self exam, mammography screening ordered, menopause, exercise, and healthy diet.    Patient to return to office in yearly for annual exam.     All questions have been answered to her satisfaction.        CC:  Annual Gynecologic Examination  Chief Complaint   Patient presents with    Gynecologic Exam     Patient is here today for her yearly exam, pap due today(21-wnl), diag mammo/diag breast US LT 3/24/22-order placed, colonoscopy-no history-patient declines order for colonoscopy. Patient is doing well, she has no concerns at this time.        HPI: Diana Cordova is a 48 y.o.  who presents for annual gynecologic examination.  She has the following concerns:  none      Health Maintenance:    Exercise: intermittently  Breast exams/breast awareness: yes  Last mammogram:   Colorectal cancer screening: due    Past Medical History:   Diagnosis Date    AMA (advanced maternal age) multigravida 35+     Asthma     childhood    Disease of thyroid gland     Female infertility     Hyperlipidemia     Varicella     vac       Past Surgical History:   Procedure Laterality Date    AUGMENTATION MAMMAPLASTY Bilateral     BREAST IMPLANT       SECTION      FERTILITY SURGERY      NOSE SURGERY      [place]  DELIVERY ONLY N/A 2019    Procedure:   SECTION ();  Surgeon: Danielle Saravia MD;  Location: [facility];  Service: Obstetrics    VA LIG/TRNSXJ FALOPIAN TUBE  DEL/ABDML SURG Bilateral 2019    Procedure: LIGATION/COAGULATION TUBAL;  Surgeon: Danielle Saravia MD;  Location: [facility];  Service: Obstetrics       Past OB/Gyn History:   No LMP recorded.    Menopausal status: perimenopausal  Menopausal symptoms: mild vaginal dryness    Last Pap:  : no abnormalities  History of abnormal Pap smear: no    Patient is currently sexually active.   STD testing: no  Current contraception: tubal ligation      Family History  Family History   Problem Relation Age of Onset    No Known Problems Mother     Heart disease Father         defect    Valvular heart disease Father     Alzheimer's disease Maternal Grandmother     Diabetes Maternal Grandfather         type 2    Diabetes Paternal Aunt         type 2    No Known Problems Son     No Known Problems Son        Family history of uterine or ovarian cancer: no  Family history of breast cancer: no  Family history of colon cancer: no    Social History:  Social History     Socioeconomic History    Marital status: /Civil Union     Spouse name: Not on file    Number of children: Not on file    Years of education: Not on file    Highest education level: Not on file   Occupational History    Not on file   Tobacco Use    Smoking status: Never    Smokeless tobacco: Never   Vaping Use    Vaping status: Never Used   Substance and Sexual Activity    Alcohol use: Yes     Alcohol/week: 9.0 standard drinks of alcohol     Types: 7 Cans of beer, 2 Shots of liquor per week     Comment: socially when not pregnant    Drug use: Never    Sexual activity: Yes     Partners: Male     Birth control/protection: Female Sterilization, None   Other Topics Concern    Not on file   Social History Narrative    Drinks coffee    Exercise habits     Social Determinants of Health     Financial Resource Strain: Not on file   Food  "Insecurity: Not on file   Transportation Needs: Not on file   Physical Activity: Not on file   Stress: Not on file   Social Connections: Not on file   Intimate Partner Violence: Not on file   Housing Stability: Not on file     Domestic violence screen: negative    Allergies:  Allergies   Allergen Reactions    Penicillins Hives and Rash       Medications:    Current Outpatient Medications:     Biotin 1000 MCG tablet, Take 1,000 mcg by mouth 3 (three) times a day, Disp: , Rfl:     Cholecalciferol 50 MCG (2000 UT) CAPS, Take 1 capsule by mouth, Disp: , Rfl:     Collagen-Vitamin C-Biotin (Collagen 1500/C) 500-50-0.8 MG CAPS, Take by mouth, Disp: , Rfl:     VITAMIN E PO, , Disp: , Rfl:     Review of Systems:  Review of Systems   Constitutional: Negative.    HENT: Negative.     Respiratory: Negative.     Cardiovascular: Negative.    Gastrointestinal: Negative.    Genitourinary: Negative.    Neurological: Negative.    Psychiatric/Behavioral: Negative.           Physical Exam:  /62 (BP Location: Right arm, Patient Position: Sitting, Cuff Size: Standard)   Ht 5' 0.1\" (1.527 m)   Wt 54.4 kg (120 lb)   BMI 23.36 kg/m²    Physical Exam  Constitutional:       Appearance: Normal appearance.   Genitourinary:      Bladder and urethral meatus normal.      No lesions in the vagina.      Right Labia: No rash, tenderness, lesions, skin changes or Bartholin's cyst.     Left Labia: No tenderness, lesions, skin changes, Bartholin's cyst or rash.     No vaginal erythema, tenderness or bleeding.        Right Adnexa: not tender, not full and no mass present.     Left Adnexa: not tender, not full and no mass present.     Cervix is parous.      No cervical motion tenderness, discharge, lesion or polyp.      Uterus is not enlarged, fixed or tender.      No uterine mass detected.     Urethral meatus caruncle not present.     No urethral tenderness or mass present.   Breasts:     Right: Breast implant present. No swelling, bleeding, " inverted nipple, mass, nipple discharge, skin change or tenderness.      Left: Breast implant present. No swelling, bleeding, inverted nipple, mass, nipple discharge, skin change or tenderness.   HENT:      Head: Normocephalic and atraumatic.   Eyes:      Extraocular Movements: Extraocular movements intact.      Conjunctiva/sclera: Conjunctivae normal.      Pupils: Pupils are equal, round, and reactive to light.   Cardiovascular:      Rate and Rhythm: Normal rate and regular rhythm.      Heart sounds: Normal heart sounds. No murmur heard.  Pulmonary:      Effort: Pulmonary effort is normal. No respiratory distress.      Breath sounds: Normal breath sounds. No wheezing or rales.   Abdominal:      General: There is no distension.      Palpations: Abdomen is soft.      Tenderness: There is no abdominal tenderness. There is no guarding.   Neurological:      General: No focal deficit present.      Mental Status: She is alert and oriented to person, place, and time.   Skin:     General: Skin is warm and dry.   Psychiatric:         Mood and Affect: Mood normal.         Behavior: Behavior normal.   Vitals and nursing note reviewed.

## 2024-05-08 ENCOUNTER — OFFICE VISIT (OUTPATIENT)
Dept: INTERNAL MEDICINE CLINIC | Facility: CLINIC | Age: 49
End: 2024-05-08
Payer: COMMERCIAL

## 2024-05-08 VITALS
TEMPERATURE: 97.1 F | OXYGEN SATURATION: 99 % | SYSTOLIC BLOOD PRESSURE: 106 MMHG | HEIGHT: 60 IN | DIASTOLIC BLOOD PRESSURE: 72 MMHG | WEIGHT: 122.4 LBS | BODY MASS INDEX: 24.03 KG/M2 | HEART RATE: 82 BPM

## 2024-05-08 DIAGNOSIS — E78.49 OTHER HYPERLIPIDEMIA: ICD-10-CM

## 2024-05-08 DIAGNOSIS — Z00.00 LABORATORY EXAMINATION ORDERED AS PART OF A ROUTINE GENERAL MEDICAL EXAMINATION: ICD-10-CM

## 2024-05-08 DIAGNOSIS — G47.09 OTHER INSOMNIA: ICD-10-CM

## 2024-05-08 DIAGNOSIS — Z00.00 HEALTH MAINTENANCE EXAMINATION: Primary | ICD-10-CM

## 2024-05-08 PROCEDURE — 99386 PREV VISIT NEW AGE 40-64: CPT | Performed by: INTERNAL MEDICINE

## 2024-05-08 NOTE — PROGRESS NOTES
Assessment/Plan:    Other insomnia  Reviewed sleep hygiene.  Recommend meditation, regular exercise, limit alcohol.  May continue taking melatonin.    Other hyperlipidemia  Repeat lipids, taking omega-3.       Diagnoses and all orders for this visit:    Health maintenance examination  Comments:  Declined colon cancer screening at this time. Mammogram scheduled.    Laboratory examination ordered as part of a routine general medical examination  -     CBC and differential; Future  -     Comprehensive metabolic panel; Future  -     Lipid panel; Future  -     TSH, 3rd generation with Free T4 reflex; Future    Other insomnia    Other hyperlipidemia      Follow up in 1 year or as needed.      Subjective:      Patient ID: Diana Cordova is a 48 y.o. female here to establish care.    Feels well, mainly complains of sleep.  Her 4-year-old twin boys still wake up sometimes in the middle of the night.  If they do not wake up, she does.  She would usually wake up around 2 AM, regardless of what time she falls asleep.  She feels tired all the time.  She is stressed at work.  She is taking melatonin which seems to help sometimes.  Denies any anxiety or depression.    She reports that she has a history of high cholesterol.  Her mom has a tube but does not take any medication for it.    She admits drinking beer regularly, more that what is recommended. She drinks more during the weekend, does not drink daily.      The following portions of the patient's history were reviewed and updated as appropriate: allergies, current medications, past family history, past medical history, past social history, past surgical history, and problem list.    Past Medical History:   Diagnosis Date   • AMA (advanced maternal age) multigravida 35+    • Asthma     childhood   • Disease of thyroid gland    • Female infertility    • Hyperlipidemia    • Varicella     vac     Past Surgical History:   Procedure Laterality Date   • AUGMENTATION MAMMAPLASTY  Bilateral 1998   • BREAST IMPLANT     •  SECTION     • FERTILITY SURGERY     • NOSE SURGERY     • [place]  DELIVERY ONLY N/A 2019    Procedure:  SECTION ();  Surgeon: Danielle Saravia MD;  Location: [facility];  Service: Obstetrics   • [place] LIG/TRNSXJ FALOPIAN TUBE  DEL/ABDML SURG Bilateral 2019    Procedure: LIGATION/COAGULATION TUBAL;  Surgeon: Danielle Saravia MD;  Location: [facility];  Service: Obstetrics     Family History   Problem Relation Age of Onset   • No Known Problems Mother    • Heart disease Father         CABG, CAD stent   • Valvular heart disease Father    • No Known Problems Son    • No Known Problems Son    • Alzheimer's disease Maternal Grandmother    • Diabetes Maternal Grandfather         type 2   • Diabetes Paternal Aunt         type 2     Social History     Socioeconomic History   • Marital status: /Civil Union     Spouse name: Not on file   • Number of children: Not on file   • Years of education: Not on file   • Highest education level: Not on file   Occupational History   • Not on file   Tobacco Use   • Smoking status: Never   • Smokeless tobacco: Never   Vaping Use   • Vaping status: Never Used   Substance and Sexual Activity   • Alcohol use: Yes     Alcohol/week: 22.0 standard drinks of alcohol     Types: 20 Cans of beer, 2 Shots of liquor per week     Comment: socially when not pregnant   • Drug use: Never   • Sexual activity: Yes     Partners: Male     Birth control/protection: Female Sterilization, None   Other Topics Concern   • Not on file   Social History Narrative    Drinks coffee    Exercise habits        5 y/o twin boys    Working - mortgage insurance    [facility]     Social Determinants of Health     Financial Resource Strain: Not on file   Food Insecurity: Not on file   Transportation Needs: Not on file   Physical Activity: Not on file   Stress: Not on file   Social Connections: Not on file   Intimate Partner Violence: Not  on file   Housing Stability: Not on file       Current Outpatient Medications:   •  Biotin 1000 MCG tablet, Take 1,000 mcg by mouth 3 (three) times a day, Disp: , Rfl:   •  Cholecalciferol 50 MCG (2000 UT) CAPS, Take 1 capsule by mouth, Disp: , Rfl:   •  Collagen-Vitamin C-Biotin (Collagen 1500/C) 500-50-0.8 MG CAPS, Take by mouth, Disp: , Rfl:   •  VITAMIN E PO, , Disp: , Rfl:   Allergies   Allergen Reactions   • Penicillins Hives and Rash         Review of Systems   Constitutional:  Negative for appetite change and fatigue.   HENT:  Negative for congestion, ear pain and postnasal drip.    Eyes:  Negative for visual disturbance.   Respiratory:  Negative for cough and shortness of breath.    Cardiovascular:  Negative for chest pain and leg swelling.   Gastrointestinal:  Negative for abdominal pain, constipation and diarrhea.   Genitourinary:  Negative for dysuria, frequency and urgency.   Musculoskeletal:  Negative for arthralgias and myalgias.   Skin:  Negative for rash and wound.   Neurological:  Negative for dizziness, numbness and headaches.   Hematological:  Does not bruise/bleed easily.   Psychiatric/Behavioral:  Negative for confusion. The patient is not nervous/anxious.          Objective:      /72   Pulse 82   Temp (!) 97.1 °F (36.2 °C)   Ht 5' (1.524 m)   Wt 55.5 kg (122 lb 6.4 oz)   SpO2 99%   BMI 23.90 kg/m²          Physical Exam  Vitals and nursing note reviewed.   Constitutional:       General: She is not in acute distress.     Appearance: She is well-developed.   HENT:      Head: Normocephalic and atraumatic.      Mouth/Throat:      Mouth: Mucous membranes are moist.   Eyes:      Pupils: Pupils are equal, round, and reactive to light.   Cardiovascular:      Rate and Rhythm: Normal rate and regular rhythm.      Heart sounds: Normal heart sounds.   Pulmonary:      Effort: Pulmonary effort is normal.      Breath sounds: Normal breath sounds. No wheezing.   Abdominal:      General: Bowel  sounds are normal.      Palpations: Abdomen is soft.   Musculoskeletal:         General: No swelling.      Right lower leg: No edema.      Left lower leg: No edema.   Skin:     General: Skin is warm.      Findings: No rash.   Neurological:      General: No focal deficit present.      Mental Status: She is alert and oriented to person, place, and time.   Psychiatric:         Mood and Affect: Mood and affect normal. Mood is not anxious or depressed.         Behavior: Behavior normal.         No available records for review.

## 2024-05-08 NOTE — PATIENT INSTRUCTIONS
Insomnia   AMBULATORY CARE:   Insomnia  is a condition that makes it hard to fall or stay asleep. Lack of sleep can lead to attention or memory problems during the day. You may also be bell, depressed, clumsy, or have headaches.  Contact your healthcare provider if:   Your symptoms do not get better, or they get worse.    You begin to use drugs or alcohol to fall asleep.    You have questions or concerns about your condition or care.    Medicines:   Medicines  may help you sleep more regularly or help you feel less anxious.    Take your medicine as directed.  Contact your healthcare provider if you think your medicine is not helping or if you have side effects. Tell your provider if you are allergic to any medicine. Keep a list of the medicines, vitamins, and herbs you take. Include the amounts, and when and why you take them. Bring the list or the pill bottles to follow-up visits. Carry your medicine list with you in case of an emergency.    What you can do to improve your sleep:   Create a sleep schedule.  Try to go to sleep and wake up at the same times every day. Keep a record of your sleep patterns, and any sleeping problems you have. Bring the record to follow-up visits with healthcare providers.    Do not take naps.  Naps could make it hard for you to fall asleep at bedtime.    Keep your bedroom cool, quiet, and dark.  Turn on white noise, such as a fan, to help you relax. Do not use your bed for any activity that will keep you awake. Do not read, exercise, eat, or watch TV in your bedroom.     Get up if you do not fall asleep within 20 minutes.  Move to another room and do something relaxing until you become sleepy.    Limit caffeine, alcohol, and food to earlier in the day.  Only drink caffeine in the morning. Do not drink alcohol within 6 hours of bedtime. Do not eat a heavy meal right before you go to bed.    Exercise regularly.  Daily exercise may help you sleep better. Do not exercise within 4 hours of  bedtime.    Follow up with your healthcare provider as directed:  Your healthcare provider may refer you for cognitive behavioral therapy. A behavioral therapist may help you find ways to relax, decrease stress, and improve sleep. Write down your questions so you remember to ask them during your visits.   © Copyright [employer] 2023 Information is for End User's use only and may not be sold, redistributed or otherwise used for commercial purposes.  The above information is an  only. It is not intended as medical advice for individual conditions or treatments. Talk to your doctor, nurse or pharmacist before following any medical regimen to see if it is safe and effective for you.

## 2024-05-08 NOTE — ASSESSMENT & PLAN NOTE
Reviewed sleep hygiene.  Recommend meditation, regular exercise, limit alcohol.  May continue taking melatonin.

## 2024-05-09 LAB
LAB AP GYN PRIMARY INTERPRETATION: NORMAL
Lab: NORMAL

## 2024-05-21 ENCOUNTER — APPOINTMENT (OUTPATIENT)
Dept: LAB | Facility: CLINIC | Age: 49
End: 2024-05-21
Payer: COMMERCIAL

## 2024-05-21 DIAGNOSIS — Z00.00 LABORATORY EXAMINATION ORDERED AS PART OF A ROUTINE GENERAL MEDICAL EXAMINATION: ICD-10-CM

## 2024-05-21 LAB
ALBUMIN SERPL BCP-MCNC: 4.2 G/DL (ref 3.5–5)
ALP SERPL-CCNC: 51 U/L (ref 34–104)
ALT SERPL W P-5'-P-CCNC: 16 U/L (ref 7–52)
ANION GAP SERPL CALCULATED.3IONS-SCNC: 5 MMOL/L (ref 4–13)
AST SERPL W P-5'-P-CCNC: 18 U/L (ref 13–39)
BASOPHILS # BLD AUTO: 0.05 THOUSANDS/ÂΜL (ref 0–0.1)
BASOPHILS NFR BLD AUTO: 1 % (ref 0–1)
BILIRUB SERPL-MCNC: 0.79 MG/DL (ref 0.2–1)
BUN SERPL-MCNC: 13 MG/DL (ref 5–25)
CALCIUM SERPL-MCNC: 9.6 MG/DL (ref 8.4–10.2)
CHLORIDE SERPL-SCNC: 104 MMOL/L (ref 96–108)
CHOLEST SERPL-MCNC: 264 MG/DL
CO2 SERPL-SCNC: 30 MMOL/L (ref 21–32)
CREAT SERPL-MCNC: 0.69 MG/DL (ref 0.6–1.3)
EOSINOPHIL # BLD AUTO: 0.7 THOUSAND/ÂΜL (ref 0–0.61)
EOSINOPHIL NFR BLD AUTO: 12 % (ref 0–6)
ERYTHROCYTE [DISTWIDTH] IN BLOOD BY AUTOMATED COUNT: 14.4 % (ref 11.6–15.1)
GFR SERPL CREATININE-BSD FRML MDRD: 102 ML/MIN/1.73SQ M
GLUCOSE P FAST SERPL-MCNC: 90 MG/DL (ref 65–99)
HCT VFR BLD AUTO: 40.2 % (ref 34.8–46.1)
HDLC SERPL-MCNC: 84 MG/DL
HGB BLD-MCNC: 12.8 G/DL (ref 11.5–15.4)
IMM GRANULOCYTES # BLD AUTO: 0.02 THOUSAND/UL (ref 0–0.2)
IMM GRANULOCYTES NFR BLD AUTO: 0 % (ref 0–2)
LDLC SERPL CALC-MCNC: 160 MG/DL (ref 0–100)
LYMPHOCYTES # BLD AUTO: 1.81 THOUSANDS/ÂΜL (ref 0.6–4.47)
LYMPHOCYTES NFR BLD AUTO: 31 % (ref 14–44)
MCH RBC QN AUTO: 29.8 PG (ref 26.8–34.3)
MCHC RBC AUTO-ENTMCNC: 31.8 G/DL (ref 31.4–37.4)
MCV RBC AUTO: 94 FL (ref 82–98)
MONOCYTES # BLD AUTO: 0.4 THOUSAND/ÂΜL (ref 0.17–1.22)
MONOCYTES NFR BLD AUTO: 7 % (ref 4–12)
NEUTROPHILS # BLD AUTO: 2.8 THOUSANDS/ÂΜL (ref 1.85–7.62)
NEUTS SEG NFR BLD AUTO: 49 % (ref 43–75)
NONHDLC SERPL-MCNC: 180 MG/DL
NRBC BLD AUTO-RTO: 0 /100 WBCS
PLATELET # BLD AUTO: 297 THOUSANDS/UL (ref 149–390)
PMV BLD AUTO: 9.1 FL (ref 8.9–12.7)
POTASSIUM SERPL-SCNC: 4.1 MMOL/L (ref 3.5–5.3)
PROT SERPL-MCNC: 7.1 G/DL (ref 6.4–8.4)
RBC # BLD AUTO: 4.29 MILLION/UL (ref 3.81–5.12)
SODIUM SERPL-SCNC: 139 MMOL/L (ref 135–147)
TRIGL SERPL-MCNC: 99 MG/DL
TSH SERPL DL<=0.05 MIU/L-ACNC: 3.48 UIU/ML (ref 0.45–4.5)
WBC # BLD AUTO: 5.78 THOUSAND/UL (ref 4.31–10.16)

## 2024-05-21 PROCEDURE — 80061 LIPID PANEL: CPT

## 2024-05-21 PROCEDURE — 80053 COMPREHEN METABOLIC PANEL: CPT

## 2024-05-21 PROCEDURE — 36415 COLL VENOUS BLD VENIPUNCTURE: CPT

## 2024-05-21 PROCEDURE — 84443 ASSAY THYROID STIM HORMONE: CPT

## 2024-05-21 PROCEDURE — 85025 COMPLETE CBC W/AUTO DIFF WBC: CPT

## 2024-09-04 ENCOUNTER — TELEPHONE (OUTPATIENT)
Dept: MAMMOGRAPHY | Facility: CLINIC | Age: 49
End: 2024-09-04

## 2024-09-23 DIAGNOSIS — Z12.31 ENCOUNTER FOR SCREENING MAMMOGRAM FOR MALIGNANT NEOPLASM OF BREAST: Primary | ICD-10-CM

## 2025-03-07 ENCOUNTER — TELEPHONE (OUTPATIENT)
Age: 50
End: 2025-03-07

## 2025-03-07 DIAGNOSIS — Z12.31 ENCOUNTER FOR SCREENING MAMMOGRAM FOR MALIGNANT NEOPLASM OF BREAST: Primary | ICD-10-CM

## 2025-03-07 NOTE — TELEPHONE ENCOUNTER
Patient called stating that she scheduled her her tests but does not want to have a diagnostic mammogram and a breast US done. Patient is requesting that we cancel those 2 orders and place a routine mammogram. Please follow up with the patient

## 2025-03-10 NOTE — TELEPHONE ENCOUNTER
She can do a screening but may get called back for the dx and US based off of the findings, please order the screening

## 2025-05-09 ENCOUNTER — ANNUAL EXAM (OUTPATIENT)
Dept: OBGYN CLINIC | Facility: CLINIC | Age: 50
End: 2025-05-09
Payer: COMMERCIAL

## 2025-05-09 VITALS
DIASTOLIC BLOOD PRESSURE: 60 MMHG | HEIGHT: 62 IN | BODY MASS INDEX: 22.26 KG/M2 | WEIGHT: 121 LBS | SYSTOLIC BLOOD PRESSURE: 92 MMHG

## 2025-05-09 DIAGNOSIS — Z12.11 COLON CANCER SCREENING: ICD-10-CM

## 2025-05-09 DIAGNOSIS — Z12.31 ENCOUNTER FOR SCREENING MAMMOGRAM FOR MALIGNANT NEOPLASM OF BREAST: ICD-10-CM

## 2025-05-09 DIAGNOSIS — G47.9 TROUBLE IN SLEEPING: ICD-10-CM

## 2025-05-09 DIAGNOSIS — N95.1 MENOPAUSE SYNDROME: ICD-10-CM

## 2025-05-09 DIAGNOSIS — Z01.419 WELL WOMAN EXAM WITH ROUTINE GYNECOLOGICAL EXAM: Primary | ICD-10-CM

## 2025-05-09 PROCEDURE — S0612 ANNUAL GYNECOLOGICAL EXAMINA: HCPCS

## 2025-05-09 RX ORDER — PROGESTERONE 100 MG/1
1 CAPSULE ORAL DAILY
Qty: 90 CAPSULE | Refills: 1 | Status: SHIPPED | OUTPATIENT
Start: 2025-05-09

## 2025-05-09 NOTE — PROGRESS NOTES
ASSESSMENT & PLAN:   - Would like to try taking progesterone 100mg before bed. She will RTO 3 months for f/u  - Discussed good sleep hygiene. She is currently taking 5mg melatonin and Mg before bed.     Diagnoses and all orders for this visit:    Well woman exam with routine gynecological exam  -     Mammo screening bilateral w 3d and cad; Future    Encounter for screening mammogram for malignant neoplasm of breast  -     Mammo screening bilateral w 3d and cad; Future    Menopause syndrome  -     Progesterone 100 MG CAPS; Take 1 capsule by mouth daily    Trouble in sleeping  -     Progesterone 100 MG CAPS; Take 1 capsule by mouth daily    Colon cancer screening  -     Cologuard    Other orders  -     Omega-3 Fatty Acids (Fish Oil) 300 MG CAPS      The following were reviewed in today's visit: ASCCP guidelines, Gardisil vaccination, STD testing breast self exam, mammography screening ordered, STD testing, menopause, exercise, and healthy diet.    Patient to return to office in yearly for annual exam.     All questions have been answered to her satisfaction.    CC:  Annual Gynecologic Examination  Chief Complaint   Patient presents with    Gynecologic Exam     Pap 2024 NILM; Neg HPV  Neg pap/HPV 2021  diag mammo 3/24/2022  Diag breast US LT 3/24/22     HPI: Diana Cordova is a 49 y.o.  who presents for annual gynecologic examination.  She has the following concerns:  she has been having trouble staying asleep. She typically goes to bed between 930-10pm and wakes up at 230-330AM each night. She is often unable to fall back asleep. She has tried taking melatonin and she takes this nightly. She takes magnesium as well.   She does have two young children - twin boys, 5 y/o, who are very active during the day.  Patient had diagnostic breast imaging in  for breast pain and a left breast [id number] mass was found. This showed stability over 6 months. Patient is no longer having breast pain and does not  want to complete any further diagnostic imaging. She would like to return to screening mammogram at this time. She understands that she may need to complete diagnostic imaging afterwards should the mass have changed at all.    Health Maintenance:    Exercise: intermittently  Breast exams/breast awareness: yes  Last mammogram:   Colorectal cancer screening: N/A    Past Medical History:   Diagnosis Date    AMA (advanced maternal age) multigravida 35+     Asthma     childhood    Disease of thyroid gland     Female infertility     Hyperlipidemia     Varicella     vac     Past Surgical History:   Procedure Laterality Date    AUGMENTATION MAMMAPLASTY Bilateral     BREAST IMPLANT       SECTION      FERTILITY SURGERY      NOSE SURGERY      NM  DELIVERY ONLY N/A 2019    Procedure:  SECTION ();  Surgeon: Danielle Saravia MD;  Location: [facility] ;  Service: Obstetrics    NM LIG/TRNSXJ FALOPIAN TUBE  DEL/ABDML SURG Bilateral 2019    Procedure: LIGATION/COAGULATION TUBAL;  Surgeon: Danielle Saravia MD;  Location: [facility] ;  Service: Obstetrics       Past OB/Gyn History:   Patient's last menstrual period was 2023 (exact date).    Menopausal status: postmenopausal  Menopausal symptoms: trouble staying asleep    Last Pap: 2024 : no abnormalities  History of abnormal Pap smear: no    Patient is currently sexually active.   STD testing: no  Current contraception: post menopausal status    Family History  Family History   Problem Relation Age of Onset    No Known Problems Mother     Heart disease Father         CABG, CAD stent    Valvular heart disease Father     No Known Problems Son     No Known Problems Son     Alzheimer's disease Maternal Grandmother     Diabetes Maternal Grandfather         type 2    Diabetes Paternal Aunt         type 2    Breast cancer Neg Hx     Colon cancer Neg Hx     Ovarian cancer Neg Hx      Family history of uterine or ovarian cancer:  no  Family history of breast cancer: no  Family history of colon cancer: no    Social History:  Social History     Socioeconomic History    Marital status: /Civil Union     Spouse name: Not on file    Number of children: Not on file    Years of education: Not on file    Highest education level: Not on file   Occupational History    Not on file   Tobacco Use    Smoking status: Never    Smokeless tobacco: Never   Vaping Use    Vaping status: Never Used   Substance and Sexual Activity    Alcohol use: Yes     Alcohol/week: 22.0 standard drinks of alcohol     Types: 20 Cans of beer, 2 Shots of liquor per week     Comment: socially when not pregnant    Drug use: Never    Sexual activity: Yes     Partners: Male     Birth control/protection: Female Sterilization, None   Other Topics Concern    Not on file   Social History Narrative    Drinks coffee    Exercise habits        3 y/o twin boys    Working - mortgage insurance    From [employer]     Financial Resource Strain: Not on file   Food Insecurity: Not on file   Transportation Needs: Not on file   Physical Activity: Not on file   Stress: Not on file   Social Connections: Not on file   Intimate Partner Violence: Not on file   Housing Stability: Not on file     Domestic violence screen: negative    Allergies:  Allergies   Allergen Reactions    Penicillin G Itching    Penicillins Hives and Rash       Medications:    Current Outpatient Medications:     Biotin 1000 MCG tablet, Take 1,000 mcg by mouth 3 (three) times a day, Disp: , Rfl:     Cholecalciferol 50 MCG (2000 UT) CAPS, Take 1 capsule by mouth, Disp: , Rfl:     Collagen-Vitamin C-Biotin (Collagen 1500/C) 500-50-0.8 MG CAPS, Take by mouth, Disp: , Rfl:     Progesterone 100 MG CAPS, Take 1 capsule by mouth daily, Disp: 90 capsule, Rfl: 1    VITAMIN E PO, , Disp: , Rfl:     Omega-3 Fatty Acids (Fish Oil) 300 MG CAPS, , Disp: , Rfl:     Review of Systems:  Review of Systems  "  Constitutional:  Negative for chills and fever.   Respiratory:  Negative for shortness of breath.    Cardiovascular:  Negative for chest pain.   Gastrointestinal:  Negative for abdominal pain, constipation and diarrhea.   Genitourinary:  Negative for dysuria, frequency, pelvic pain, vaginal discharge and vaginal pain.   Psychiatric/Behavioral:  Positive for sleep disturbance. Negative for self-injury and suicidal ideas.          Physical Exam:  BP 92/60 (BP Location: Left arm, Patient Position: Sitting, Cuff Size: Standard)   Ht 5' 2\" (1.575 m)   Wt 54.9 kg (121 lb)   LMP 04/21/2023 (Exact Date)   BMI 22.13 kg/m²    Physical Exam  Constitutional:       Appearance: Normal appearance.      Comments: Fatigued - continuously yawning during visit   Genitourinary:      Vulva and urethral meatus normal.      No labial fusion noted.      No vaginal erythema or tenderness.        Right Adnexa: not tender.     Left Adnexa: not tender.     No cervical discharge or friability.      Uterus is not tender.   Breasts:     Breasts are symmetrical.      Right: Normal.      Left: Normal.   HENT:      Head: Normocephalic and atraumatic.   Neck:      Thyroid: No thyroid mass or thyroid tenderness.   Cardiovascular:      Rate and Rhythm: Normal rate and regular rhythm.      Heart sounds: Normal heart sounds. No murmur heard.  Pulmonary:      Effort: Pulmonary effort is normal.      Breath sounds: Normal breath sounds. No wheezing.   Abdominal:      Palpations: Abdomen is soft. There is no mass.      Tenderness: There is no abdominal tenderness.   Lymphadenopathy:      Cervical: No cervical adenopathy.   Neurological:      General: No focal deficit present.      Mental Status: She is alert and oriented to person, place, and time.   Skin:     General: Skin is warm and dry.   Psychiatric:         Mood and Affect: Mood normal.         Behavior: Behavior normal.   Vitals and nursing note reviewed.                              "

## 2025-06-21 LAB — COLOGUARD RESULT REPORTABLE: NEGATIVE

## 2025-06-23 ENCOUNTER — RESULTS FOLLOW-UP (OUTPATIENT)
Dept: OBGYN CLINIC | Facility: CLINIC | Age: 50
End: 2025-06-23

## (undated) DEVICE — DRESSING TELFA 2 X 3 IN STRL

## (undated) DEVICE — Device

## (undated) DEVICE — ABDOMINAL PAD: Brand: DERMACEA

## (undated) DEVICE — SKIN MARKER DUAL TIP WITH RULER CAP, FLEXIBLE RULER AND LABELS: Brand: DEVON

## (undated) DEVICE — ADHESIVE SKIN HIGH VISCOSITY EXOFIN 1ML

## (undated) DEVICE — CHLORAPREP HI-LITE 26ML ORANGE

## (undated) DEVICE — GLOVE SRG BIOGEL ECLIPSE 6.5

## (undated) DEVICE — SUT VICRYL 0 CT-1 27 IN J260H

## (undated) DEVICE — GAUZE SPONGES,16 PLY: Brand: CURITY

## (undated) DEVICE — PACK C-SECTION PBDS

## (undated) DEVICE — TELFA NON-ADHERENT ABSORBENT DRESSING: Brand: TELFA

## (undated) DEVICE — SUT MONOCRYL 0 36 IN UNDYED Y946H

## (undated) DEVICE — GLOVE INDICATOR PI UNDERGLOVE SZ 6.5 BLUE

## (undated) DEVICE — SURGICEL 2 X 3